# Patient Record
Sex: FEMALE | Race: WHITE | NOT HISPANIC OR LATINO | Employment: OTHER | URBAN - METROPOLITAN AREA
[De-identification: names, ages, dates, MRNs, and addresses within clinical notes are randomized per-mention and may not be internally consistent; named-entity substitution may affect disease eponyms.]

---

## 2017-01-30 ENCOUNTER — ALLSCRIPTS OFFICE VISIT (OUTPATIENT)
Dept: OTHER | Facility: OTHER | Age: 82
End: 2017-01-30

## 2017-05-12 ENCOUNTER — APPOINTMENT (EMERGENCY)
Dept: CT IMAGING | Facility: HOSPITAL | Age: 82
End: 2017-05-12
Payer: MEDICARE

## 2017-05-12 ENCOUNTER — HOSPITAL ENCOUNTER (EMERGENCY)
Facility: HOSPITAL | Age: 82
Discharge: HOME | End: 2017-05-12
Attending: EMERGENCY MEDICINE | Admitting: EMERGENCY MEDICINE
Payer: MEDICARE

## 2017-05-12 VITALS
SYSTOLIC BLOOD PRESSURE: 158 MMHG | BODY MASS INDEX: 23.09 KG/M2 | DIASTOLIC BLOOD PRESSURE: 70 MMHG | HEIGHT: 58 IN | WEIGHT: 110 LBS | OXYGEN SATURATION: 100 % | TEMPERATURE: 96.7 F | RESPIRATION RATE: 18 BRPM | HEART RATE: 54 BPM

## 2017-05-12 DIAGNOSIS — N39.0 UTI (URINARY TRACT INFECTION): ICD-10-CM

## 2017-05-12 DIAGNOSIS — S09.90XA HEAD INJURY, INITIAL ENCOUNTER: ICD-10-CM

## 2017-05-12 DIAGNOSIS — K59.00 CONSTIPATION, UNSPECIFIED CONSTIPATION TYPE: ICD-10-CM

## 2017-05-12 DIAGNOSIS — R01.1 HEART MURMUR: ICD-10-CM

## 2017-05-12 DIAGNOSIS — R53.1 GENERALIZED WEAKNESS: Primary | ICD-10-CM
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
## 2017-05-12 LAB
ALBUMIN SERPL BCP-MCNC: 3.6 G/DL (ref 3.5–5)
ALP SERPL-CCNC: 72 U/L (ref 46–116)
ALT SERPL W P-5'-P-CCNC: 22 U/L (ref 12–78)
ANION GAP SERPL CALCULATED.3IONS-SCNC: 6 MMOL/L (ref 4–13)
AST SERPL W P-5'-P-CCNC: 24 U/L (ref 5–45)
ATRIAL RATE: 66 BPM
BACTERIA UR QL AUTO: ABNORMAL /HPF
BASOPHILS # BLD AUTO: 0.02 THOUSANDS/ΜL (ref 0–0.1)
BASOPHILS NFR BLD AUTO: 0 % (ref 0–1)
BILIRUB SERPL-MCNC: 0.4 MG/DL (ref 0.2–1)
BILIRUB UR QL STRIP: NEGATIVE
BUN SERPL-MCNC: 20 MG/DL (ref 5–25)
CALCIUM SERPL-MCNC: 8.8 MG/DL (ref 8.3–10.1)
CHLORIDE SERPL-SCNC: 101 MMOL/L (ref 100–108)
CLARITY UR: ABNORMAL
CO2 SERPL-SCNC: 30 MMOL/L (ref 21–32)
COLOR UR: YELLOW
CREAT SERPL-MCNC: 0.98 MG/DL (ref 0.6–1.3)
EOSINOPHIL # BLD AUTO: 0.06 THOUSAND/ΜL (ref 0–0.61)
EOSINOPHIL NFR BLD AUTO: 1 % (ref 0–6)
ERYTHROCYTE [DISTWIDTH] IN BLOOD BY AUTOMATED COUNT: 12.3 % (ref 11.6–15.1)
GFR SERPL CREATININE-BSD FRML MDRD: 52.8 ML/MIN/1.73SQ M
GLUCOSE SERPL-MCNC: 109 MG/DL (ref 65–140)
GLUCOSE UR STRIP-MCNC: NEGATIVE MG/DL
HCT VFR BLD AUTO: 40.4 % (ref 34.8–46.1)
HGB BLD-MCNC: 13.7 G/DL (ref 11.5–15.4)
HGB UR QL STRIP.AUTO: NEGATIVE
KETONES UR STRIP-MCNC: NEGATIVE MG/DL
LEUKOCYTE ESTERASE UR QL STRIP: ABNORMAL
LYMPHOCYTES # BLD AUTO: 1.64 THOUSANDS/ΜL (ref 0.6–4.47)
LYMPHOCYTES NFR BLD AUTO: 17 % (ref 14–44)
MCH RBC QN AUTO: 32.4 PG (ref 26.8–34.3)
MCHC RBC AUTO-ENTMCNC: 33.9 G/DL (ref 31.4–37.4)
MCV RBC AUTO: 96 FL (ref 82–98)
MONOCYTES # BLD AUTO: 1.38 THOUSAND/ΜL (ref 0.17–1.22)
MONOCYTES NFR BLD AUTO: 15 % (ref 4–12)
NEUTROPHILS # BLD AUTO: 6.39 THOUSANDS/ΜL (ref 1.85–7.62)
NEUTS SEG NFR BLD AUTO: 67 % (ref 43–75)
NITRITE UR QL STRIP: NEGATIVE
NON-SQ EPI CELLS URNS QL MICRO: ABNORMAL /HPF
P AXIS: 71 DEGREES
PH UR STRIP.AUTO: 7 [PH] (ref 4.5–8)
PLATELET # BLD AUTO: 179 THOUSANDS/UL (ref 149–390)
PMV BLD AUTO: 11.2 FL (ref 8.9–12.7)
POTASSIUM SERPL-SCNC: 3.9 MMOL/L (ref 3.5–5.3)
PR INTERVAL: 206 MS
PROT SERPL-MCNC: 7.6 G/DL (ref 6.4–8.2)
PROT UR STRIP-MCNC: NEGATIVE MG/DL
QRS AXIS: -64 DEGREES
QRSD INTERVAL: 84 MS
QT INTERVAL: 412 MS
QTC INTERVAL: 431 MS
RBC # BLD AUTO: 4.23 MILLION/UL (ref 3.81–5.12)
RBC #/AREA URNS AUTO: ABNORMAL /HPF
SODIUM SERPL-SCNC: 137 MMOL/L (ref 136–145)
SP GR UR STRIP.AUTO: 1.01 (ref 1–1.03)
T WAVE AXIS: 35 DEGREES
TROPONIN I SERPL-MCNC: <0.02 NG/ML
UROBILINOGEN UR QL STRIP.AUTO: 0.2 E.U./DL
VENTRICULAR RATE: 66 BPM
WBC # BLD AUTO: 9.49 THOUSAND/UL (ref 4.31–10.16)
WBC #/AREA URNS AUTO: ABNORMAL /HPF

## 2017-05-12 PROCEDURE — 36415 COLL VENOUS BLD VENIPUNCTURE: CPT | Performed by: EMERGENCY MEDICINE

## 2017-05-12 PROCEDURE — 80053 COMPREHEN METABOLIC PANEL: CPT | Performed by: EMERGENCY MEDICINE

## 2017-05-12 PROCEDURE — 81001 URINALYSIS AUTO W/SCOPE: CPT | Performed by: EMERGENCY MEDICINE

## 2017-05-12 PROCEDURE — G8980 MOBILITY D/C STATUS: HCPCS

## 2017-05-12 PROCEDURE — 84484 ASSAY OF TROPONIN QUANT: CPT | Performed by: EMERGENCY MEDICINE

## 2017-05-12 PROCEDURE — 93005 ELECTROCARDIOGRAM TRACING: CPT | Performed by: EMERGENCY MEDICINE

## 2017-05-12 PROCEDURE — 97163 PT EVAL HIGH COMPLEX 45 MIN: CPT

## 2017-05-12 PROCEDURE — G8978 MOBILITY CURRENT STATUS: HCPCS

## 2017-05-12 PROCEDURE — 85025 COMPLETE CBC W/AUTO DIFF WBC: CPT | Performed by: EMERGENCY MEDICINE

## 2017-05-12 PROCEDURE — 72131 CT LUMBAR SPINE W/O DYE: CPT

## 2017-05-12 PROCEDURE — 99285 EMERGENCY DEPT VISIT HI MDM: CPT

## 2017-05-12 PROCEDURE — 70450 CT HEAD/BRAIN W/O DYE: CPT

## 2017-05-12 PROCEDURE — 96360 HYDRATION IV INFUSION INIT: CPT

## 2017-05-12 PROCEDURE — E0255 HOSPITAL BED VAR HT W/ MATTR: HCPCS | Performed by: EMERGENCY MEDICINE

## 2017-05-12 PROCEDURE — G8979 MOBILITY GOAL STATUS: HCPCS

## 2017-05-12 RX ORDER — NITROFURANTOIN MACROCRYSTALS 100 MG/1
100 CAPSULE ORAL 2 TIMES DAILY
COMMUNITY
End: 2017-05-12

## 2017-05-12 RX ORDER — LEVOFLOXACIN 250 MG/1
250 TABLET ORAL DAILY
Qty: 3 TABLET | Refills: 0 | Status: SHIPPED | OUTPATIENT
Start: 2017-05-13 | End: 2017-05-16

## 2017-05-12 RX ORDER — DOXYCYCLINE HYCLATE 50 MG/1
324 CAPSULE, GELATIN COATED ORAL EVERY OTHER DAY
Qty: 15 TABLET | Refills: 0 | Status: SHIPPED | OUTPATIENT
Start: 2017-05-12 | End: 2018-03-19

## 2017-05-12 RX ORDER — ALPRAZOLAM 0.25 MG/1
0.25 TABLET ORAL
COMMUNITY
End: 2018-01-24 | Stop reason: ALTCHOICE

## 2017-05-12 RX ORDER — IBUPROFEN 600 MG/1
600 TABLET ORAL ONCE
Status: COMPLETED | OUTPATIENT
Start: 2017-05-12 | End: 2017-05-12

## 2017-05-12 RX ADMIN — IBUPROFEN 600 MG: 600 TABLET, FILM COATED ORAL at 15:06

## 2017-05-12 RX ADMIN — SODIUM CHLORIDE 500 ML: 0.9 INJECTION, SOLUTION INTRAVENOUS at 16:35

## 2017-05-18 ENCOUNTER — ALLSCRIPTS OFFICE VISIT (OUTPATIENT)
Dept: OTHER | Facility: OTHER | Age: 82
End: 2017-05-18

## 2017-06-20 ENCOUNTER — APPOINTMENT (EMERGENCY)
Dept: CT IMAGING | Facility: HOSPITAL | Age: 82
End: 2017-06-20
Payer: MEDICARE

## 2017-06-20 ENCOUNTER — HOSPITAL ENCOUNTER (EMERGENCY)
Facility: HOSPITAL | Age: 82
Discharge: HOME | End: 2017-06-20
Admitting: EMERGENCY MEDICINE
Payer: MEDICARE

## 2017-06-20 ENCOUNTER — APPOINTMENT (EMERGENCY)
Dept: RADIOLOGY | Facility: HOSPITAL | Age: 82
End: 2017-06-20
Payer: MEDICARE

## 2017-06-20 VITALS
WEIGHT: 105 LBS | HEART RATE: 55 BPM | DIASTOLIC BLOOD PRESSURE: 58 MMHG | OXYGEN SATURATION: 97 % | TEMPERATURE: 99.4 F | BODY MASS INDEX: 21.95 KG/M2 | SYSTOLIC BLOOD PRESSURE: 126 MMHG | RESPIRATION RATE: 19 BRPM

## 2017-06-20 DIAGNOSIS — N39.0 URINARY TRACT INFECTION: ICD-10-CM

## 2017-06-20 DIAGNOSIS — S00.93XA CONTUSION OF HEAD: ICD-10-CM

## 2017-06-20 DIAGNOSIS — T17.1XXA FOREIGN BODY IN NOSE: ICD-10-CM

## 2017-06-20 DIAGNOSIS — S00.31XA ABRASION OF NOSE: ICD-10-CM
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
## 2017-06-20 LAB
ALBUMIN SERPL BCP-MCNC: 3.2 G/DL (ref 3.5–5)
ALP SERPL-CCNC: 72 U/L (ref 46–116)
ALT SERPL W P-5'-P-CCNC: 20 U/L (ref 12–78)
ANION GAP SERPL CALCULATED.3IONS-SCNC: 6 MMOL/L (ref 4–13)
APTT PPP: 33 SECONDS (ref 23–35)
AST SERPL W P-5'-P-CCNC: 24 U/L (ref 5–45)
BACTERIA UR QL AUTO: ABNORMAL /HPF
BASOPHILS # BLD AUTO: 0.04 THOUSANDS/ΜL (ref 0–0.1)
BASOPHILS NFR BLD AUTO: 1 % (ref 0–1)
BILIRUB SERPL-MCNC: 0.2 MG/DL (ref 0.2–1)
BILIRUB UR QL STRIP: NEGATIVE
BUN SERPL-MCNC: 27 MG/DL (ref 5–25)
CALCIUM SERPL-MCNC: 8.8 MG/DL (ref 8.3–10.1)
CHLORIDE SERPL-SCNC: 104 MMOL/L (ref 100–108)
CLARITY UR: CLEAR
CO2 SERPL-SCNC: 31 MMOL/L (ref 21–32)
COLOR UR: YELLOW
CREAT SERPL-MCNC: 0.98 MG/DL (ref 0.6–1.3)
EOSINOPHIL # BLD AUTO: 0.15 THOUSAND/ΜL (ref 0–0.61)
EOSINOPHIL NFR BLD AUTO: 2 % (ref 0–6)
ERYTHROCYTE [DISTWIDTH] IN BLOOD BY AUTOMATED COUNT: 12.5 % (ref 11.6–15.1)
GFR SERPL CREATININE-BSD FRML MDRD: 52.8 ML/MIN/1.73SQ M
GLUCOSE SERPL-MCNC: 84 MG/DL (ref 65–140)
GLUCOSE UR STRIP-MCNC: NEGATIVE MG/DL
HCT VFR BLD AUTO: 36.4 % (ref 34.8–46.1)
HGB BLD-MCNC: 12.4 G/DL (ref 11.5–15.4)
HGB UR QL STRIP.AUTO: NEGATIVE
INR PPP: 1.08 (ref 0.86–1.16)
KETONES UR STRIP-MCNC: NEGATIVE MG/DL
LEUKOCYTE ESTERASE UR QL STRIP: ABNORMAL
LYMPHOCYTES # BLD AUTO: 1.91 THOUSANDS/ΜL (ref 0.6–4.47)
LYMPHOCYTES NFR BLD AUTO: 30 % (ref 14–44)
MCH RBC QN AUTO: 32.5 PG (ref 26.8–34.3)
MCHC RBC AUTO-ENTMCNC: 34.1 G/DL (ref 31.4–37.4)
MCV RBC AUTO: 96 FL (ref 82–98)
MONOCYTES # BLD AUTO: 0.91 THOUSAND/ΜL (ref 0.17–1.22)
MONOCYTES NFR BLD AUTO: 14 % (ref 4–12)
NEUTROPHILS # BLD AUTO: 3.42 THOUSANDS/ΜL (ref 1.85–7.62)
NEUTS SEG NFR BLD AUTO: 53 % (ref 43–75)
NITRITE UR QL STRIP: NEGATIVE
NON-SQ EPI CELLS URNS QL MICRO: ABNORMAL /HPF
OTHER STN SPEC: ABNORMAL
PH UR STRIP.AUTO: 7.5 [PH] (ref 4.5–8)
PLATELET # BLD AUTO: 158 THOUSANDS/UL (ref 149–390)
PMV BLD AUTO: 10.5 FL (ref 8.9–12.7)
POTASSIUM SERPL-SCNC: 4.4 MMOL/L (ref 3.5–5.3)
PROT SERPL-MCNC: 6.7 G/DL (ref 6.4–8.2)
PROT UR STRIP-MCNC: NEGATIVE MG/DL
PROTHROMBIN TIME: 13.8 SECONDS (ref 12.1–14.4)
RBC # BLD AUTO: 3.81 MILLION/UL (ref 3.81–5.12)
RBC #/AREA URNS AUTO: ABNORMAL /HPF
SODIUM SERPL-SCNC: 141 MMOL/L (ref 136–145)
SP GR UR STRIP.AUTO: 1.01 (ref 1–1.03)
TROPONIN I SERPL-MCNC: <0.02 NG/ML
UROBILINOGEN UR QL STRIP.AUTO: 0.2 E.U./DL
WBC # BLD AUTO: 6.43 THOUSAND/UL (ref 4.31–10.16)
WBC #/AREA URNS AUTO: ABNORMAL /HPF

## 2017-06-20 PROCEDURE — 85730 THROMBOPLASTIN TIME PARTIAL: CPT | Performed by: PHYSICIAN ASSISTANT

## 2017-06-20 PROCEDURE — 80053 COMPREHEN METABOLIC PANEL: CPT | Performed by: PHYSICIAN ASSISTANT

## 2017-06-20 PROCEDURE — 93005 ELECTROCARDIOGRAM TRACING: CPT | Performed by: PHYSICIAN ASSISTANT

## 2017-06-20 PROCEDURE — 90715 TDAP VACCINE 7 YRS/> IM: CPT | Performed by: PHYSICIAN ASSISTANT

## 2017-06-20 PROCEDURE — 36415 COLL VENOUS BLD VENIPUNCTURE: CPT | Performed by: PHYSICIAN ASSISTANT

## 2017-06-20 PROCEDURE — 72125 CT NECK SPINE W/O DYE: CPT

## 2017-06-20 PROCEDURE — 71020 HB CHEST X-RAY 2VW FRONTAL&LATL: CPT

## 2017-06-20 PROCEDURE — 81001 URINALYSIS AUTO W/SCOPE: CPT | Performed by: PHYSICIAN ASSISTANT

## 2017-06-20 PROCEDURE — 84484 ASSAY OF TROPONIN QUANT: CPT | Performed by: PHYSICIAN ASSISTANT

## 2017-06-20 PROCEDURE — 70450 CT HEAD/BRAIN W/O DYE: CPT

## 2017-06-20 PROCEDURE — 90471 IMMUNIZATION ADMIN: CPT

## 2017-06-20 PROCEDURE — 99284 EMERGENCY DEPT VISIT MOD MDM: CPT

## 2017-06-20 PROCEDURE — 85025 COMPLETE CBC W/AUTO DIFF WBC: CPT | Performed by: PHYSICIAN ASSISTANT

## 2017-06-20 PROCEDURE — 87086 URINE CULTURE/COLONY COUNT: CPT | Performed by: PHYSICIAN ASSISTANT

## 2017-06-20 PROCEDURE — 85610 PROTHROMBIN TIME: CPT | Performed by: PHYSICIAN ASSISTANT

## 2017-06-20 RX ORDER — SULFAMETHOXAZOLE AND TRIMETHOPRIM 800; 160 MG/1; MG/1
1 TABLET ORAL ONCE
Status: COMPLETED | OUTPATIENT
Start: 2017-06-20 | End: 2017-06-20

## 2017-06-20 RX ORDER — SULFAMETHOXAZOLE AND TRIMETHOPRIM 800; 160 MG/1; MG/1
1 TABLET ORAL 2 TIMES DAILY
Qty: 6 TABLET | Refills: 0 | Status: SHIPPED | OUTPATIENT
Start: 2017-06-20 | End: 2018-01-24 | Stop reason: ALTCHOICE

## 2017-06-20 RX ORDER — ASPIRIN 81 MG/1
81 TABLET ORAL EVERY OTHER DAY
COMMUNITY

## 2017-06-20 RX ORDER — RIBOFLAVIN (VITAMIN B2) 100 MG
100 TABLET ORAL DAILY
COMMUNITY
End: 2018-03-19

## 2017-06-20 RX ADMIN — SULFAMETHOXAZOLE AND TRIMETHOPRIM 1 TABLET: 800; 160 TABLET ORAL at 21:30

## 2017-06-20 RX ADMIN — TETANUS TOXOID, REDUCED DIPHTHERIA TOXOID AND ACELLULAR PERTUSSIS VACCINE, ADSORBED 0.5 ML: 5; 2.5; 8; 8; 2.5 SUSPENSION INTRAMUSCULAR at 19:52

## 2017-06-21 LAB
ATRIAL RATE: 67 BPM
P AXIS: 75 DEGREES
PR INTERVAL: 220 MS
QRS AXIS: -62 DEGREES
QRSD INTERVAL: 84 MS
QT INTERVAL: 420 MS
QTC INTERVAL: 443 MS
T WAVE AXIS: 71 DEGREES
VENTRICULAR RATE: 67 BPM

## 2017-06-23 LAB — BACTERIA UR CULT: NORMAL

## 2017-07-03 ENCOUNTER — ALLSCRIPTS OFFICE VISIT (OUTPATIENT)
Dept: OTHER | Facility: OTHER | Age: 82
End: 2017-07-03

## 2017-07-03 DIAGNOSIS — R29.898 OTHER SYMPTOMS AND SIGNS INVOLVING THE MUSCULOSKELETAL SYSTEM: ICD-10-CM
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
## 2017-08-22 ENCOUNTER — ALLSCRIPTS OFFICE VISIT (OUTPATIENT)
Dept: OTHER | Facility: OTHER | Age: 82
End: 2017-08-22

## 2017-08-25 ENCOUNTER — GENERIC CONVERSION - ENCOUNTER (OUTPATIENT)
Dept: OTHER | Facility: OTHER | Age: 82
End: 2017-08-25

## 2017-10-19 ENCOUNTER — ALLSCRIPTS OFFICE VISIT (OUTPATIENT)
Dept: OTHER | Facility: OTHER | Age: 82
End: 2017-10-19

## 2017-10-19 LAB
BILIRUB UR QL STRIP: NORMAL
CLARITY UR: NORMAL
COLOR UR: YELLOW
GLUCOSE (HISTORICAL): NORMAL
HGB UR QL STRIP.AUTO: NORMAL
KETONES UR STRIP-MCNC: NORMAL MG/DL
LEUKOCYTE ESTERASE UR QL STRIP: NORMAL
NITRITE UR QL STRIP: NORMAL
PH UR STRIP.AUTO: 6 [PH]
PROT UR STRIP-MCNC: NORMAL MG/DL
SP GR UR STRIP.AUTO: 1.01
UROBILINOGEN UR QL STRIP.AUTO: 0.2

## 2017-10-20 DIAGNOSIS — R26.9 ABNORMALITY OF GAIT AND MOBILITY: ICD-10-CM

## 2017-10-20 NOTE — PROGRESS NOTES
Assessment    1  Eczema (692 9) (L30 9)  2  Aortic stenosis, severe (424 1) (I35 0)  3  Dementia (294 20) (F03 90)  4  Depression (311) (F32 9)    Plan  Chronic low back pain, Urinary tract infection, site unspecified    · Urine Dip Non-Automated- POC; Status:Complete;   Done: 59MTK5628 02:28PM  Eczema    · Start: Triamcinolone Acetonide 0 1 % External Cream; USE AS DIRECTED  Need for influenza vaccination    · Administered: Fluzone High-Dose 0 5 ML Intramuscular Suspension Prefilled Syringe    Discussion/Summary    I have asked him to apply triamcinolone cream along with a plastic bag and a sock at bedtime and during the day to apply an elastic bandage along with some moisturizing ointment  a walker was requested for this patient  She is unable to leave her home without considerable effort and this is very taxing by her limited door ends and her easy fatigability in a short distance of ambulation  1        1 Amended By: Myranda Silveira; Dec 07 2017 7:14 PM EST    Chief Complaint  Pt here for flu shot, u/a and crack on right heal       Advance Directives  Advance Directive [facility]:   The patient is not in agreement to receive the Advance Care Planning service--   YES - Patient has an advance health care directive  The patient has a signed POLST form located in patient's home  History of Present Illness  HPI: Patient is here with her daughter  She has a crack on her right heel  They Jonny put applying Neosporin to it  She brought a urine for dip because of concerns of urinary frequency  Her appetite remains suboptimal  She denies any pain      Review of Systems   Constitutional: No fever, no chills, feels well, no tiredness, no recent weight gain or loss  ENT: no ear ache, no loss of hearing, no nosebleeds or nasal discharge, no sore throat or hoarseness  Cardiovascular: no complaints of slow or fast heart rate, no chest pain, no palpitations, no leg claudication or lower extremity edema    Respiratory: no complaints of shortness of breath, no wheezing, no dyspnea on exertion, no orthopnea or PND  Breasts: no complaints of breast pain, breast lump or nipple discharge  Gastrointestinal: no complaints of abdominal pain, no constipation, no nausea or diarrhea, no vomiting, no bloody stools  Genitourinary: no complaints of dysuria, no incontinence, no pelvic pain, no dysmenorrhea, no vaginal discharge or abnormal vaginal bleeding  Musculoskeletal: no complaints of arthralgia, no myalgia, no joint swelling or stiffness, no limb pain or swelling  Integumentary: no complaints of skin rash or lesion, no itching or dry skin, no skin wounds  Neurological: no complaints of headache, no confusion, no numbness or tingling, no dizziness or fainting  Active Problems  1  Acute gingivitis (523 00) (K05 00)  2  Aortic stenosis, severe (424 1) (I35 0)  3  Basal cell carcinoma of skin of nose (173 31) (C44 311)  4  Basal Cell Carcinoma Of The Skin Of The Neck (173 41)  5  Chronic low back pain (724 2,338 29) (M54 5,G89 29)  6  Constipation (564 00) (K59 00)  7  Decubitus ulcer of right ankle, stage 2 (480 83,639 31) (L89 512)  8  Dehydration (276 51) (E86 0)  9  Dementia (294 20) (F03 90)  10  Depression (311) (F32 9)  11  Dyslipidemia (272 4) (E78 5)  12  Falls (E888 9) (W19 XXXA)  13  Flu vaccine need (V04 81) (Z23)  14  Gastroesophageal reflux disease (530 81) (K21 9)  15  Iron deficiency anemia secondary to inadequate dietary iron intake (280 1) (D50 8)  16  Malignant neoplasm of breast (174 9) (C50 919)  17  Medicare annual wellness visit, subsequent (V70 0) (Z00 00)  18  Muscular deconditioning (781 99) (R29 898)  19  Overactive bladder (596 51) (N32 81)  20  Urinary tract infection, site unspecified (599 0) (N39 0)    Past Medical History  1  History of Age At First Period 15 Years Old (Menarche)  2  History of Age At First Pregnancy 21 Years Old  3  History of Benign essential hypertension (401 1) (I10)  4   History of Benign hypertensive CKD (403 10) (I12 9)  5  History of Breast Cancer (V10 3)  6  History of Closed Compression Fracture Of Lumbar Vertebral Body (805 4)  7  History of Clostridium difficile colitis (008 45) (A04 72)  8  History of Costochondritis (733 6) (M94 0)  9  History of Currently Wearing Eyeglasses  10  History of Gait disturbance (781 2) (R26 9)  11  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)  12  History Of 2  Previous Pregnancies (V61 5)  13  History of aortic valve stenosis (V12 59) (Z86 79)  14  History of bursitis (V13 59) (Z87 39)  15  History of diverticulitis of colon (V12 79) (Z87 19)  16  History of gastroesophageal reflux (GERD) (V12 79) (Z87 19)  17  History of herpes zoster (V12 09) (Z86 19)  18  History of hiatal hernia (V12 79) (Z87 19)  19  History of nausea (V12 79) (Z87 898)  20  History of neoplasm of uncertain behavior of skin (V13 3) (Z86 03)  21  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)  22  History of Neck pain (723 1) (M54 2)  23  History of Overactive bladder (596 51) (N32 81)  24  History of Previous Pregnancies Resulted In 2  Live Birth(S)  25  History of Right Trapezius Muscle Strain (840 8)  26  History of Skin rash (782 1) (R21)  27  History of Urge incontinence of urine (788 31) (N39 41)  28  History of Urinary Tract Infection (V13 02)  Active Problems And Past Medical History Reviewed: The active problems and past medical history were reviewed and updated today  Family History  Mother   1  Family history of Heart Disease (V17 49)  Father   2  Family history of Cancer  Sister   3  Family history of Cancer  Brother   4  Family history of Cancer  Maternal Aunt   5  Family history of Breast Cancer (V16 3)  Family History   6  Family history of Rectal Cancer  Family History Reviewed: The family history was reviewed and updated today         Social History   · Daily Coffee Consumption (2  Cups/Day)   · Denied: History of Drug Use   · Never A Smoker   · Never Drank Alcohol   · Nonsmoker (V49 89) (Z78 9)  The social history was reviewed and updated today  Surgical History    1  History of Breast Surgery Lumpectomy  2  History of Cholecystectomy  3  History of Hysterectomy  Surgical History Reviewed: The surgical history was reviewed and updated today  Current Meds  1  Acidophilus Probiotic Oral Tablet; Therapy: 39Pfb1628 to Recorded  2  Aspirin Childrens 81 MG Oral Tablet Chewable; TAKE ONE CHEWABLE TABLET EVERY OTHER DAY; Therapy: 07BOT0858 to Recorded  3  Calcium 500 MG TABS; Therapy: 81FYW0218 to Recorded  4  Cephalexin 500 MG Oral Capsule; TAKE 1 CAPSULE EVERY 8 HOURS UNTIL GONE; Therapy: 03Knz4977 to (Evaluate:33Fty4868)  Requested for: 02Fet7516; Last Rx:28Hzr9085 Ordered  5  Citalopram Hydrobromide 10 MG Oral Tablet; take 1 tablet by mouth every day; Therapy: 80Pzj1678 to (Evaluate:19Mar2018)  Requested for: 65Ypu1181; Last Rx:87Gux0231 Ordered  6  Cranberry 125 MG Oral Tablet; Therapy: 55KFX3019 to Recorded  7  Famotidine 10 MG Oral Tablet; Therapy: 06JPW9707 to Recorded  8  Ferrous Gluconate 325 (36 Fe) MG Oral Tablet; Take 1 tablet daily; Therapy: 92HEY9382 to (Evaluate:80Wxw9988)  Requested for: 72GVD4021; Last VQ:52KMN7677 Ordered  9  Letrozole 2 5 MG Oral Tablet; take 1 tablet by mouth every day; Therapy: 30APQ2567 to (Evaluate:69Zvq8717)  Requested for: 65EVJ4240; Last Rx:29Izm2643 Ordered  10  Mupirocin 2 % External Ointment; APPLY AS DIRECTED; Therapy: 42YKK0509 to (Last Rx:56Bgz4694)  Requested for: 64BAS8222 Ordered  11  Namenda XR 14 MG Oral Capsule Extended Release 24 Hour; take 1 capsule daily; Therapy: 33FMP2047 to (Evaluate:19Mar2018)  Requested for: 82AHY8486; Last  Rx:56Eyv2324 Ordered  12  Nitrofurantoin Macrocrystal 100 MG Oral Capsule; TAKE 1 CAPSULE EVERY 12 HOURS  DAILY; Therapy: 88YAG3413 to (Evaluate:58Aod2142)  Requested for: 24XRL6720; Last  Rx:38Fem3953 Ordered    The medication list was reviewed and updated today  Allergies  1  Codeine Sulfate TABS  2  Percocet TABS  3  Vicodin TABS  4  TraMADol HCl TABS  5  Adhesive Tape    Vitals   Recorded: 57VTB6575 02:13PM   Temperature 97 8 F   Heart Rate 51   Systolic 500   Diastolic 72   Height 5 ft    Height Unobtainable No   Weight Unobtainable Yes   O2 Saturation 97       Physical Exam   Constitutional  General appearance: No acute distress, well appearing and well nourished  Eyes  Conjunctiva and lids: No swelling, erythema or discharge  Pupils and irises: Equal, round and reactive to light  Ears, Nose, Mouth, and Throat  External inspection of ears and nose: Normal    Otoscopic examination: Tympanic membranes translucent with normal light reflex  Canals patent without erythema  Nasal mucosa, septum, and turbinates: Normal without edema or erythema  Oropharynx: Normal with no erythema, edema, exudate or lesions  Pulmonary  Respiratory effort: No increased work of breathing or signs of respiratory distress  Auscultation of lungs: Clear to auscultation  Cardiovascular  Auscultation of heart: Abnormal  -- Murmur noted  Examination of extremities for edema and/or varicosities: Normal    Carotid pulses: Normal    Abdomen  Abdomen: Non-tender, no masses  Liver and spleen: No hepatomegaly or splenomegaly  Lymphatic  Palpation of lymph nodes in neck: No lymphadenopathy  Musculoskeletal  Gait and station: Normal    Digits and nails: Normal without clubbing or cyanosis  Inspection/palpation of joints, bones, and muscles: Normal    Skin  Skin and subcutaneous tissue: Abnormal  -- Large linear crackle on the right heel  Neurologic  Cranial nerves: Cranial nerves 2-12 intact  Reflexes: 2+ and symmetric  Sensation: No sensory loss     Psychiatric  Orientation to person, place, and time: Normal    Mood and affect: Normal          Results/Data  Urine Dip Non-Automated- POC 66FEU5569 02:28PM Anselmo Mask     Test Name Result Flag Reference   Color Yellow Clarity Transparent     Leukocytes NEG     Nitrite NEG     Blood NEG     Bilirubin NEG     Urobilinogen 0 2     Protein NEG     Ph 6 0     Specific Gravity 1 010     Ketone NEG     Glucose NEG         Future Appointments    Date/Time Provider Specialty Site   10/24/2017 01:00 PM NYA Bello   [address]       Signatures   Electronically signed by : Anusha Delgado DO; Dec  7 2017  7:15PM EST                       (Author)

## 2017-10-24 ENCOUNTER — GENERIC CONVERSION - ENCOUNTER (OUTPATIENT)
Dept: OTHER | Facility: OTHER | Age: 82
End: 2017-10-24

## 2017-10-24 ENCOUNTER — ALLSCRIPTS OFFICE VISIT (OUTPATIENT)
Dept: OTHER | Facility: OTHER | Age: 82
End: 2017-10-24

## 2017-11-29 ENCOUNTER — ALLSCRIPTS OFFICE VISIT (OUTPATIENT)
Dept: OTHER | Facility: OTHER | Age: 82
End: 2017-11-29

## 2018-01-12 VITALS
OXYGEN SATURATION: 99 % | TEMPERATURE: 97.4 F | SYSTOLIC BLOOD PRESSURE: 120 MMHG | WEIGHT: 105 LBS | DIASTOLIC BLOOD PRESSURE: 60 MMHG | HEIGHT: 60 IN | BODY MASS INDEX: 20.62 KG/M2 | RESPIRATION RATE: 16 BRPM | HEART RATE: 58 BPM

## 2018-01-13 VITALS
HEIGHT: 60 IN | DIASTOLIC BLOOD PRESSURE: 72 MMHG | SYSTOLIC BLOOD PRESSURE: 106 MMHG | HEART RATE: 64 BPM | OXYGEN SATURATION: 97 % | TEMPERATURE: 98.3 F

## 2018-01-13 VITALS
DIASTOLIC BLOOD PRESSURE: 72 MMHG | HEART RATE: 51 BPM | TEMPERATURE: 97.8 F | OXYGEN SATURATION: 97 % | HEIGHT: 60 IN | SYSTOLIC BLOOD PRESSURE: 110 MMHG

## 2018-01-13 VITALS
OXYGEN SATURATION: 98 % | HEART RATE: 63 BPM | WEIGHT: 106.25 LBS | DIASTOLIC BLOOD PRESSURE: 64 MMHG | TEMPERATURE: 96.9 F | HEIGHT: 60 IN | BODY MASS INDEX: 20.86 KG/M2 | SYSTOLIC BLOOD PRESSURE: 115 MMHG

## 2018-01-14 VITALS
WEIGHT: 107 LBS | HEIGHT: 60 IN | OXYGEN SATURATION: 98 % | BODY MASS INDEX: 21.01 KG/M2 | HEART RATE: 77 BPM | DIASTOLIC BLOOD PRESSURE: 68 MMHG | TEMPERATURE: 99.6 F | SYSTOLIC BLOOD PRESSURE: 108 MMHG

## 2018-01-18 NOTE — PROGRESS NOTES
Assessment    1  Aortic stenosis, severe (424 1) (I35 0)   2  Debility (799 3) (R53 81)   3  Goals of care, counseling/discussion (V65 49) (Z71 89)   4  Falls (E888 9) (W19 XXXA)   5  Dementia (294 20) (F03 90)    Discussion/Summary    Goals of care: POLST previously completed  5 wishes reviewed and competed today  At this time, pt does not meet hospice eligibly criteria but is at high risk for complications d/t her frailty and history of falls  Pt is clear that should an event occur she would elect conservative treatment options and choose hospice if appropriate  He daughter is aware of her wishes and supportive  History of falls:  -Pt's home environment assessed and recommendations made regarding throw rugs, suggested removing these as they could be tripping hazard    -Pt uses walker and is currently receiving home PT twice a week  Encouraged her to take her time changing positions and always use her walker for ambulation  Suggestions provided to pt's daughter regarding additional home services that may help alleviate care giver burden  Recommended the use of Tylenol to help treat mild aches and pains  Pt denies need for palliative symptom control a this time  I will return for a F/U visit in 2 months  Please call with questions or concerns  I can return sooner should pt develop any symptoms that would require palliative management  recommend tylenol mild ahes ad pain  eucerin cream    The patient, patient's family was counseled regarding instructions for management  total time of encounter was 90 minutes and 60 minutes was spent counseling  time spent filling out 5 wishes document, discussing goals of care  The patient has the current Goals: Remain at home  The patent has the current Barriers: Frail and not yet hospice appropriate  Patient is able to Self-Care  Possible side effects of new medications were reviewed with the patient/guardian today   The treatment plan was reviewed with the patient/guardian  The patient/guardian understands and agrees with the treatment plan     Self Referrals: No      Chief Complaint  PAL at [address]  initial visit to establish care in home program  Pt with severe aortic stenosis, frailty and h/o frequent falls      History of Present Illness  Ms Lucio Stubbs is a very pleasant 79 yo female who has been following with Dr Luis M Rodriguez for several chronic medical conditions including severe AS, dementia, frequent falls  She is being seen at home today due to her frailty and difficulty making it into doctor's appointments  Pt's daughter Alissa Rebolledo was present for a portion of today's visit and provided assistance with med reconciliation and report on pt's wellbeing since she was last seen  Pt herself has poor short term memory  She does appear competent today during my visit and answers my questions appropriately  She has requested assistance with completing her AD (5 wishes was started at her last office visit) as she expresses her concern that her memory will fail her and she wants her wishes to be known and followed by her children  Per daughter, her mom has frequent falls; mostly recently she had a fall last week but did not injure herself or require medical attention  Pt herself denies any chest pain, shortness or breath, change in appetite or bowel and bladder function  She has some generalized aches and states "I feel like an old lady"    Pt lives in an apartment built within family home; she has caregivers 3 days a week, meals on wheels, and home PT twice a week  Her daughter feels the stress of caring for her mom 24/7  Pt refuses SNF placement and her ultimate goal is to "die at home"     The patient has a POLST form  Section A: Cardiopulmonary Resuscitation (CPR): DNR/Do not attempt resuscitation (allow natural death)  Section B: Medical Interventions: Limited additional interventions     Section C: Antibiotics: Determine use or limitation of antibiotics when infection occurs, with comfort a goal    Section D: Artificially Administered Hydration/Nutrition: No hydration and artificial nutrition by tube  The patient has a legal Power of   The POA name is Shared between [address] [place] [address] and [place]  The patient has a five wishes document  Discussion of advanced directives 5 wishes documentation completed today  Review of Systems    Constitutional: No fever, no chills, feels well, no tiredness, no recent weight gain or weight loss  Eyes: No complaints of eye pain, no red eyes, no eyesight problems, no discharge, no dry eyes, no itching of eyes  ENT: no complaints of earache, no loss of hearing, no nose bleeds, no nasal discharge, no sore throat, no hoarseness  Cardiovascular: No complaints of slow heart rate, no fast heart rate, no chest pain, no palpitations, no leg claudication, no lower extremity edema  Respiratory: No complaints of shortness of breath, no wheezing, no cough, no SOB on exertion, no orthopnea, no PND  Gastrointestinal: No complaints of abdominal pain, no constipation, no nausea or vomiting, no diarrhea, no bloody stools  Genitourinary: No complaints of dysuria, no incontinence, no pelvic pain, no dysmenorrhea, no vaginal discharge or bleeding  The patient presents with complaints of mild lower back arthralgias  Integumentary: No complaints of skin rash or lesions, no itching, no skin wounds, no breast pain or lump  Neurological: difficulty walking  Psychiatric: Not suicidal, no sleep disturbance, no anxiety or depression, no change in personality, no emotional problems  Endocrine: muscle weakness  Hematologic/Lymphatic: No complaints of swollen glands, no swollen glands in the neck, does not bleed easily, does not bruise easily  ROS reviewed  Active Problems    1  Acute gingivitis (523 00) (K05 00)   2  Aortic stenosis, severe (424 1) (I35 0)   3  Basal cell carcinoma of skin of nose (173 31) (C42 311)   4   Basal Cell Carcinoma Of The Skin Of The Neck (173 41)   5  Chronic low back pain (724 2,338 29) (M54 5,G89 29)   6  Constipation (564 00) (K59 00)   7  Debility (799 3) (R53 81)   8  Decubitus ulcer of right ankle, stage 2 (911 53,032 78) (L89 512)   9  Dehydration (276 51) (E86 0)   10  Dementia (294 20) (F03 90)   11  Depression (311) (F32 9)   12  Dyslipidemia (272 4) (E78 5)   13  Eczema (692 9) (L30 9)   14  Falls (E888 9) (W19 XXXA)   15  Flu vaccine need (V04 81) (Z23)   16  Gait disturbance (781 2) (R26 9)   17  Gastroesophageal reflux disease (530 81) (K21 9)   18  Iron deficiency anemia secondary to inadequate dietary iron intake (280 1) (D50 8)   19  Malignant neoplasm of breast (174 9) (C50 919)   20  Medicare annual wellness visit, subsequent (V70 0) (Z00 00)   21  Muscular deconditioning (781 99) (R29 898)   22  Need for influenza vaccination (V04 81) (Z23)   23  Overactive bladder (596 51) (N32 81)   24  UTI (urinary tract infection) (599 0) (N39 0)    Past Medical History    1  History of Age At First Period 15 Years Old (Menarche)   2  History of Age At First Pregnancy 21 Years Old   3  History of Benign essential hypertension (401 1) (I10)   4  History of Benign hypertensive CKD (403 10) (I12 9)   5  History of Breast Cancer (V10 3)   6  History of Closed Compression Fracture Of Lumbar Vertebral Body (805 4)   7  History of Clostridium difficile colitis (008 45) (A04 72)   8  History of Costochondritis (733 6) (M94 0)   9  History of Currently Wearing Eyeglasses   10  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)   11  History Of 2  Previous Pregnancies (V61 5)   12  History of aortic valve stenosis (V12 59) (Z86 79)   13  History of bursitis (V13 59) (Z87 39)   14  History of diverticulitis of colon (V12 79) (Z87 19)   15  History of gastroesophageal reflux (GERD) (V12 79) (Z87 19)   16  History of herpes zoster (V12 09) (Z86 19)   17  History of hiatal hernia (V12 79) (Z87 19)   18  History of nausea (V12 79) (Z87 898)   19  History of neoplasm of uncertain behavior of skin (V13 3) (Z86 03)   20  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)   21  History of Neck pain (723 1) (M54 2)   22  History of Overactive bladder (596 51) (N32 81)   23  History of Previous Pregnancies Resulted In 2  Live Birth(S)   24  History of Right Trapezius Muscle Strain (840 8)   25  History of Skin rash (782 1) (R21)   26  History of Urge incontinence of urine (788 31) (N39 41)   27  History of Urinary Tract Infection (V13 02)    Surgical History    1  History of Breast Surgery Lumpectomy   2  History of Cholecystectomy   3  History of Hysterectomy    The surgical history was reviewed and updated today  Family History    1  Family history of Heart Disease (V17 49)    2  Family history of Cancer    3  Family history of Cancer    4  Family history of Cancer    5  Family history of Breast Cancer (V16 3)    6  Family history of Rectal Cancer    The family history was reviewed and updated today  Social History    · Daily Coffee Consumption (2  Cups/Day)   · Denied: History of Drug Use   · Never A Smoker   · Never Drank Alcohol   · Nonsmoker (V49 89) (Z78 9)  The social history was reviewed and updated today  The social history was reviewed and is unchanged  Current Meds   1  Acidophilus Probiotic Oral Tablet; Therapy: 29Gdz5902 to Recorded   2  Aspirin Childrens 81 MG Oral Tablet Chewable; TAKE ONE CHEWABLE TABLET EVERY   OTHER DAY; Therapy: 13HNY5620 to Recorded   3  Calcium 500 MG TABS; Therapy: 52YGA6644 to Recorded   4  Citalopram Hydrobromide 10 MG Oral Tablet; take 1 tablet by mouth every day; Therapy: 68Ntk1072 to (Evaluate:19Mar2018)  Requested for: 87Rfg0920; Last   Rx:24Cak7683 Ordered   5  Colace Clear 50 MG Oral Capsule; Therapy: 50ZKW3699 to Recorded   6  Cranberry 125 MG Oral Tablet; Therapy: 65RDS2653 to Recorded   7  Famotidine 10 MG Oral Tablet;    Therapy: 29CZF4585 to Recorded 8  Ferrous Gluconate 325 (36 Fe) MG Oral Tablet; Take 1 tablet daily; Therapy: 62QJC5566 to (Evaluate:31Yip0463)  Requested for: 83WER3985; Last   Rx:26Jun2017 Ordered   9  Letrozole 2 5 MG Oral Tablet; take 1 tablet by mouth every day; Therapy: 69PHO4154 to (Evaluate:41Aay2624)  Requested for: 70JBM5520; Last   Rx:63Rce4222 Ordered   10  Namenda XR 14 MG Oral Capsule Extended Release 24 Hour; take 1 capsule daily; Therapy: 07IIP1050 to (Evaluate:19Mar2018)  Requested for: 33ZYA1146; Last    Rx:34Wcx6712 Ordered   11  Triamcinolone Acetonide 0 1 % External Cream; USE AS DIRECTED; Therapy: 92GRY2157 to (Last Rx:19Oct2017)  Requested for: 19Oct2017 Ordered   12  Vitamin C 250 MG Oral Tablet Chewable; CHEW AND SWALLOW 1 TABLET DAILY; Therapy: 02FTV4353 to Recorded    The medication list was reviewed and updated today  Allergies    1  Codeine Sulfate TABS   2  Percocet TABS   3  Vicodin TABS   4  TraMADol HCl TABS    5  Adhesive Tape    Vitals  Signs   Recorded: 37CYS0459 03:40PM   Temperature: 97 4 F  Heart Rate: 60  Respiration: 16  Systolic: 933  Diastolic: 70    Physical Exam    Constitutional   General appearance: Abnormal   Alert, pleasant, Appears Frail  Eyes   Conjunctiva and lids: No swelling, erythema or discharge  Ears, Nose, Mouth, and Throat   Oropharynx: Normal with no erythema, edema, exudate or lesions  Pulmonary   Respiratory effort: No increased work of breathing or signs of respiratory distress  Cardiovascular   Auscultation of heart: Abnormal   + Murmur  Abdomen   Abdomen: Non-tender, no masses  Musculoskeletal Slow steady gait; requires use of walker  Inspection/palpation of joints, bones, and muscles: Abnormal   Kyphosis  Skin Dry, thin skin  Neurologic   Cranial nerves: Cranial nerves 2-12 intact  Psychiatric   Orientation to person, place, and time: Normal   Short term memory loss     Mood and affect: Normal          Attending Note  Collaborating Physician: I did not interview and examine the patient, I discussed the case with the Advanced Practitioner and reviewed the note and I agree with the Advanced Practitioner note        Signatures   Electronically signed by : Nico Beal [facility]; Nov 29 2017  6:06PM EST                       (Author)    Electronically signed by : NYA Clark ; Nov 30 2017  2:06PM EST                       (Author)

## 2018-01-22 VITALS
SYSTOLIC BLOOD PRESSURE: 120 MMHG | TEMPERATURE: 97.4 F | RESPIRATION RATE: 16 BRPM | HEART RATE: 60 BPM | DIASTOLIC BLOOD PRESSURE: 70 MMHG

## 2018-01-22 VITALS
SYSTOLIC BLOOD PRESSURE: 106 MMHG | DIASTOLIC BLOOD PRESSURE: 70 MMHG | HEIGHT: 60 IN | TEMPERATURE: 97.4 F | RESPIRATION RATE: 14 BRPM | WEIGHT: 106 LBS | HEART RATE: 60 BPM | BODY MASS INDEX: 20.81 KG/M2

## 2018-01-24 ENCOUNTER — OFFICE VISIT (OUTPATIENT)
Dept: PALLIATIVE MEDICINE | Facility: HOSPITAL | Age: 83
End: 2018-01-24
Payer: MEDICARE

## 2018-01-24 VITALS
DIASTOLIC BLOOD PRESSURE: 60 MMHG | TEMPERATURE: 97.4 F | RESPIRATION RATE: 16 BRPM | HEART RATE: 56 BPM | SYSTOLIC BLOOD PRESSURE: 110 MMHG

## 2018-01-24 DIAGNOSIS — Z91.81 AT HIGH RISK FOR FALLS: ICD-10-CM

## 2018-01-24 DIAGNOSIS — L89.612 DECUBITUS ULCER OF RIGHT HEEL, STAGE 2 (HCC): Primary | ICD-10-CM

## 2018-01-24 DIAGNOSIS — R54 AGE-RELATED PHYSICAL DEBILITY: ICD-10-CM

## 2018-01-24 DIAGNOSIS — I35.0 SEVERE AORTIC STENOSIS: ICD-10-CM

## 2018-01-24 PROCEDURE — 99349 HOME/RES VST EST MOD MDM 40: CPT | Performed by: NURSE PRACTITIONER

## 2018-01-24 NOTE — PROGRESS NOTES
Assessment:  Aortic Stenosis, severe  Debility  H/O falls  Stage 2 pressure ulcer right heel    Plan    Referral to Wound care clinic to assess and provide treatment recommendations  Recommendations made to prevent further skin breakdown and pt encouraged to elevate heel off the surface with a pillow and encouraged proper nutrition and adequate protein intake  Justyna Castillo and her daughter deny any need for palliative symptom control at this time  I will return for a follow up visit in 2 months  Please call with any questions or concerns  I can return sooner should Justyna Castillo develop any symptoms that would require palliative management  Advanced care Planning: No changes to overall goals of care that were dicussed at previous visit  5 wishes and POLST form completed    At this time, Justyna aCstillo does not meet hospice eligibility criteria but is at high risk for complications due to her frailty, frequent UTIs, and history of falls  Patient is clear that should an event occur  she would elect conservative treatment options and choose hospice if appropriate  Her daughter is aware of her wishes and very supportive  60 minutes was spent face to face with Shireen Mock and her daughter with greater than 50% of the time spent in counseling or coordination of care including discussions of treatment instructions and patient and family counseling/involvement in care   All of the patient's questions were answered during this discussion  Chief Complaint    Home Follow up visit as part of [facility] program for: severe aortic stenosis, debility, h/o frequent falls and new stage 2 pressure ulcer of right heel     History of Present Illness    Ms Macario Cresnhaw is a very pleasant 57-year-old female with several chronic medical conditions including severe aortic stenosis, debility, recurring UTIs, frequent falls, and a new stage II pressure ulcer located on her right heel    She is being seen at home today due to her frailty and difficulty making it to doctor's appointments  Her daughter Florentino Swan was present for today's visit  Since last visit, she has been treated for a UTI and completed a course of antibiotics for this sore on her right heel  She denies fever or chills  She has some generalized aches and pains particularly in her lower back and relates this to her old age  Overall, Florentino Swan states that her mom has been doing rather well  She has not had any recent falls  Her appetite is good and she reports no changes in her bowel or bladder functions  She has completed home PT services and is ambulating with her walker  She spends most of the day sedentary due to becoming fatigued easily  She denies chest pain, palpitations  No shortness of breath    Florentino Swan reports that she has hired additional caregivers to be available so that there is always someone with Kayla Ch and providing support  Review of Systems    Ear, Nose, Mouth, and Throat: Negative  Respiratory: Negative  Cardiovascular: Negative   Gastrointestinal: Negative  Genitourinary: Negative  Musculoskeletal:back pain positive,  lower,  mild  Integumentary: Breakdown; stage 2 pressure ulcer located on right theel  Neurologic: Abnormal balance, difficulty walking   Psychiatric:Negative    All other systems:All negative except as listed above    Vital Signs    There were no vitals taken for this visit  Physical Exam    General: Alert, pleasant  Frail appearing  Neuro:Alert and oriented x 4, No motor deficits   Short term memory loss  ENT:oropharynx clear without erythema  Eyes:white sclera, Extraocular Movements Intact  Pulm:clear to auscultation bilaterally  CV: + Murmur  GI:non-distended, non-tender  Musculoskeletal: Kyphosis; slow to rise from seated position, requires use of walker for ambulation  Integument: Right heel has stage 2 breakdown approximately 2x2 cm  Psych:appropriate affect    ROS      Physical Exam

## 2018-01-24 NOTE — PATIENT INSTRUCTIONS
Fall Prevention   AMBULATORY CARE:   Fall prevention  includes ways to make your home and other areas safer  It also includes ways you can move more carefully to prevent a fall  Health conditions that cause changes in your blood pressure, vision, or muscle strength and coordination may increase your risk for falls  Medicines may also increase your risk for falls if they make you dizzy, weak, or sleepy  Call 911 or have someone else call if:   · You have fallen and are unconscious  · You have fallen and cannot move part of your body  Contact your healthcare provider if:   · You have fallen and have pain or a headache  · You have questions or concerns about your condition or care  Fall prevention tips:   · Stand or sit up slowly  This may help you keep your balance and prevent falls  · Use assistive devices as directed  Your healthcare provider may suggest that you use a cane or walker to help you keep your balance  You may need to have grab bars put in your bathroom near the toilet or in the shower  · Wear shoes that fit well and have soles that   Wear shoes both inside and outside  Use slippers with good   Do not wear shoes with high heels  · Wear a personal alarm  This is a device that allows you to call 911 if you fall and need help  Ask your healthcare provider for more information  · Stay active  Exercise can help strengthen your muscles and improve your balance  Your healthcare provider may recommend water aerobics or walking  He or she may also recommend physical therapy to improve your coordination  Never start an exercise program without talking to your healthcare provider first      · Manage your medical conditions  Keep all appointments with your healthcare providers  Visit your eye doctor as directed  Home safety tips:   · Add items to prevent falls in the bathroom  Put nonslip strips on your bath or shower floor to prevent you from slipping   Use a bath mat if you do not have carpet in the bathroom  This will prevent you from falling when you step out of the bath or shower  Use a shower seat so you do not need to stand while you shower  Sit on the toilet or a chair in your bathroom to dry yourself and put on clothing  This will prevent you from losing your balance from drying or dressing yourself while you are standing  · Keep paths clear  Remove books, shoes, and other objects from walkways and stairs  Place cords for telephones and lamps out of the way so that you do not need to walk over them  Tape them down if you cannot move them  Remove small rugs  If you cannot remove a rug, secure it with double-sided tape  This will prevent you from tripping  · Install bright lights in your home  Use night lights to help light paths to the bathroom or kitchen  Always turn on the light before you start walking  · Keep items you use often on shelves within reach  Do not use a step stool to help you reach an item  · Paint or place reflective tape on the edges of your stairs  This will help you see the stairs better  Follow up with your healthcare provider as directed:  Write down your questions so you remember to ask them during your visits  © 2017 [address] [employer] Information is for End User's use only and may not be sold, redistributed or otherwise used for commercial purposes  All illustrations and images included in CareNotes® are the copyrighted property of [employer]  or [employer]  The above information is an  only  It is not intended as medical advice for individual conditions or treatments  Talk to your doctor, nurse or pharmacist before following any medical regimen to see if it is safe and effective for you

## 2018-01-25 ENCOUNTER — TELEPHONE (OUTPATIENT)
Dept: PALLIATIVE MEDICINE | Facility: HOSPITAL | Age: 83
End: 2018-01-25

## 2018-02-01 ENCOUNTER — APPOINTMENT (OUTPATIENT)
Dept: WOUND CARE | Facility: HOSPITAL | Age: 83
End: 2018-02-01
Attending: PODIATRIST
Payer: MEDICARE

## 2018-02-01 PROCEDURE — 99213 OFFICE O/P EST LOW 20 MIN: CPT | Performed by: PODIATRIST

## 2018-02-01 NOTE — TELEPHONE ENCOUNTER
I think it would be best for pt to be seen by wound care in follow up  They have seen her and are treating her so they should see her again to offer further recommendations   Thanks

## 2018-02-02 NOTE — TELEPHONE ENCOUNTER
I spoke with Rick Espino - called Smua Cheema back on her primary phone number and LM that Rick Espino wants her to continue to follow up with wound care weekly till they discharge her

## 2018-02-02 NOTE — TELEPHONE ENCOUNTER
I need to remember to Schedule Vera Donahue with  Follow up HV end of March last apt of the day- I will reach out to her  Patient is scd 3/21/18 with Parminder Esquivel at home and will be following with wound care till she is discharged

## 2018-02-16 ENCOUNTER — TELEPHONE (OUTPATIENT)
Dept: PALLIATIVE MEDICINE | Facility: HOSPITAL | Age: 83
End: 2018-02-16

## 2018-02-22 ENCOUNTER — APPOINTMENT (OUTPATIENT)
Dept: WOUND CARE | Facility: HOSPITAL | Age: 83
End: 2018-02-22
Attending: PODIATRIST
Payer: MEDICARE

## 2018-02-22 PROCEDURE — 99213 OFFICE O/P EST LOW 20 MIN: CPT | Performed by: PODIATRIST

## 2018-03-19 ENCOUNTER — OFFICE VISIT (OUTPATIENT)
Dept: FAMILY MEDICINE CLINIC | Facility: CLINIC | Age: 83
End: 2018-03-19
Payer: MEDICARE

## 2018-03-19 VITALS
OXYGEN SATURATION: 98 % | SYSTOLIC BLOOD PRESSURE: 108 MMHG | HEART RATE: 61 BPM | DIASTOLIC BLOOD PRESSURE: 48 MMHG | TEMPERATURE: 97.7 F

## 2018-03-19 DIAGNOSIS — F03.91 DEMENTIA WITH BEHAVIORAL DISTURBANCE, UNSPECIFIED DEMENTIA TYPE (HCC): Primary | ICD-10-CM

## 2018-03-19 DIAGNOSIS — F33.41 RECURRENT MAJOR DEPRESSIVE DISORDER, IN PARTIAL REMISSION (HCC): Primary | ICD-10-CM
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
## 2018-03-19 PROBLEM — R53.81 DEBILITY: Status: ACTIVE | Noted: 2017-10-24

## 2018-03-19 PROBLEM — M54.50 CHRONIC LOW BACK PAIN: Status: ACTIVE | Noted: 2017-05-18

## 2018-03-19 PROBLEM — L30.9 ECZEMA: Status: ACTIVE | Noted: 2017-10-19

## 2018-03-19 PROBLEM — L97.409 HEEL ULCER (HCC): Status: ACTIVE | Noted: 2017-12-29

## 2018-03-19 PROBLEM — G89.29 CHRONIC LOW BACK PAIN: Status: ACTIVE | Noted: 2017-05-18

## 2018-03-19 PROCEDURE — 99214 OFFICE O/P EST MOD 30 MIN: CPT | Performed by: FAMILY MEDICINE

## 2018-03-19 RX ORDER — ASCORBIC ACID 250 MG
1 TABLET,CHEWABLE ORAL DAILY
COMMUNITY
Start: 2017-11-29

## 2018-03-19 RX ORDER — CITALOPRAM 10 MG/1
TABLET ORAL
Qty: 30 TABLET | Refills: 5 | Status: SHIPPED | OUTPATIENT
Start: 2018-03-19 | End: 2018-10-10 | Stop reason: SDUPTHER

## 2018-03-19 RX ORDER — MEMANTINE HYDROCHLORIDE 14 MG/1
CAPSULE, EXTENDED RELEASE ORAL
COMMUNITY
Start: 2018-03-12 | End: 2018-03-19

## 2018-03-19 RX ORDER — TRIAMCINOLONE ACETONIDE 1 MG/G
CREAM TOPICAL
COMMUNITY
Start: 2017-10-19 | End: 2018-03-19

## 2018-03-19 RX ORDER — DOXYCYCLINE HYCLATE 50 MG/1
1 CAPSULE, GELATIN COATED ORAL DAILY
Refills: 5 | COMMUNITY
Start: 2017-12-13 | End: 2018-04-21 | Stop reason: SDUPTHER

## 2018-03-19 NOTE — PROGRESS NOTES
Assessment/Plan:    No problem-specific Assessment & Plan notes found for this encounter  Diagnoses and all orders for this visit:    Dementia with behavioral disturbance, unspecified dementia type    At this point the Namenda probably is not helping though I did advise the daughter, Barrett Florez, that is up to them whether not they want to discontinue it  She can request crisis her mother pharmacies to see if this would be any cheaper anywhere else  Additionally, she may need medications in addition to the citalopram for her behavioral disturbances  Above all, though, the patient does likely need to be in a nursing home  She has become increasingly agitated and paranoid with her dementia  She is verbally abusive to her daughter at this point  Her daughter does understand this is from the dementia but is also affecting her significantly  The patient and her family have seen Dr Sundeep Alicia for many years  He was with them through the patient's 's death from Huntingtons disease  I asked the patient if it was okay if I discussed this all with him in she was very much on board with this plan  After the patient and her daughter left the office I did have a long conversation about this with Dr Sundeep Alicia all and he has agreed to see them this Saturday to discuss permanent placement options  More than half of this 30 minute visit spent counseling and coordinating care  Subjective:      Patient ID: Norma Galdamez is a 80 y o  female      The pt is here today with her dtr Barrett Florez  She has a dx of dementia  She is on Namenda  Has been on it for years   They're not sure it is helping  It is very expensive and increasingly hard to get - the pharmacy had to order it last time and it took a week  She also has increasing anxiety and depression  Has been angry, has mood changes   She is on citalopram  Has been on it for 2 years  Since starting it her dtr felt initially that perhapts there is generally more of a calmness to her demeanor  But then she still has triggers - subjects that come up that causes increased anxiety and depression  More paranoia then recently as well, paranoid about people talking about her etc   She's also had sleep disturbances, changes in sleep habits, getting up in the middle of the night  She has had more and more falls over the years  She has palliative care coming to the house and has had PT and OT at home  When the therapy is over, though, some of the same tendencies come back and the falls increase again  Sitting too long in one spot happens again  She then develops ulcers  It has become a pattern    Her dtr Hilary Suh is the only family member who will take care of her  She lives with her   The pt is demeaning and nasty to her dtr, says terrible things to her     The pt told me 5 times while in with the 2 of them that I should know Kelly Wylie family history about her biological mother  Hilary Suh was adopted - biological mom had bipolar d/o and drug issues          The following portions of the patient's history were reviewed and updated as appropriate: allergies, current medications, past family history, past medical history, past social history, past surgical history and problem list     Review of Systems      Objective:  Vitals:    03/19/18 1319   BP: (!) 108/48   BP Location: Left arm   Pulse: 61   Temp: 97 7 °F (36 5 °C)   SpO2: 98%      Physical Exam   Constitutional:   The pt is in a wheelchair, in NAD   Psychiatric: Her affect is angry, labile and inappropriate  She is agitated  Cognition and memory are impaired  She expresses inappropriate judgment  She exhibits abnormal recent memory

## 2018-03-22 ENCOUNTER — TELEPHONE (OUTPATIENT)
Dept: FAMILY MEDICINE CLINIC | Facility: CLINIC | Age: 83
End: 2018-03-22

## 2018-03-22 NOTE — TELEPHONE ENCOUNTER
I returned Pauline's call - I reassured her that I did speak with Dr Alba Rivera about her mom entering a nursing home  Nazario Cruz has many concerns - does not think her mom will "agree to it"  While she says she has read a lot on caregivers and dementia she seems to have a poor understanding of the fact that she is going to have to make this decision  Her mom does not have the mental capacity at this point to make that decision  She spoke with her brother who is in total disagreement  He told her how she should be caring for her mom - but he does not want to do so  Nazario Cruz has questions about pre-paying for a  and respite care and what paperwork needs to be completed - I told her that this was not my area and I know nothing about it  Dr Alba Rivera may not even have all of these answers but may be able to direct her to the person to answer these questions  Finally, she is also very concerned about talking about these things in front of her mom because it gets her mom so agitated and she still has to care for her  I reassured her that Dr Alba Rivera not only knows her mom well but has worked with patients with dementia and their families for years  They do have an appt to see Dr Alba Rivera Saturday

## 2018-03-24 ENCOUNTER — OFFICE VISIT (OUTPATIENT)
Dept: FAMILY MEDICINE CLINIC | Facility: CLINIC | Age: 83
End: 2018-03-24
Payer: MEDICARE

## 2018-03-24 VITALS
TEMPERATURE: 96.4 F | HEART RATE: 66 BPM | OXYGEN SATURATION: 95 % | DIASTOLIC BLOOD PRESSURE: 60 MMHG | SYSTOLIC BLOOD PRESSURE: 110 MMHG

## 2018-03-24 DIAGNOSIS — G30.1 LATE ONSET ALZHEIMER'S DISEASE WITH BEHAVIORAL DISTURBANCE (HCC): ICD-10-CM

## 2018-03-24 DIAGNOSIS — R41.0 CONFUSION: Primary | ICD-10-CM

## 2018-03-24 DIAGNOSIS — R31.9 HEMATURIA, UNSPECIFIED TYPE: ICD-10-CM

## 2018-03-24 DIAGNOSIS — I35.0 AORTIC STENOSIS, SEVERE: ICD-10-CM

## 2018-03-24 DIAGNOSIS — F02.81 LATE ONSET ALZHEIMER'S DISEASE WITH BEHAVIORAL DISTURBANCE (HCC): ICD-10-CM

## 2018-03-24 LAB
SL AMB  POCT GLUCOSE, UA: NEGATIVE
SL AMB LEUKOCYTE ESTERASE,UA: ABNORMAL
SL AMB POCT BILIRUBIN,UA: NEGATIVE
SL AMB POCT BLOOD,UA: ABNORMAL
SL AMB POCT CLARITY,UA: ABNORMAL
SL AMB POCT COLOR,UA: YELLOW
SL AMB POCT KETONES,UA: NEGATIVE
SL AMB POCT NITRITE,UA: POSITIVE
SL AMB POCT PH,UA: 6
SL AMB POCT SPECIFIC GRAVITY,UA: 1.02
SL AMB POCT URINE PROTEIN: NEGATIVE
SL AMB POCT UROBILINOGEN: 0.2

## 2018-03-24 PROCEDURE — 99213 OFFICE O/P EST LOW 20 MIN: CPT | Performed by: FAMILY MEDICINE

## 2018-03-24 PROCEDURE — 81002 URINALYSIS NONAUTO W/O SCOPE: CPT | Performed by: FAMILY MEDICINE

## 2018-03-24 NOTE — PATIENT INSTRUCTIONS
Alzheimer Disease   AMBULATORY CARE:   Alzheimer disease  is an irreversible brain disorder that results in the gradual loss of memory  Parts of the brain die and cannot make normal levels of brain chemicals  The disease usually starts at about age 72 to 79 years but can start earlier  Common symptoms include the following:   · Mild AD:  Early AD symptoms may be minor and last from 1 to 3 years  ¨ Memory loss: Remembering what happened years ago, but unable to remember things from yesterday or forgetting the names of common things or people you know    ¨ Confusion about what month or season it is    ¨ Forgetting to brush your teeth or comb your hair    ¨ Difficulty taking care of your home or finances or difficulty making decisions    ¨ Loss of interest in your usual activities    ¨ Feeling depressed, angry, or confused about the changes you notice    · Moderate AD:      ¨ Problems choosing what clothes to wear, doing simple jobs, or caring for your self    ¨ Unable to recognize people familiar to you    ¨ Difficulty finding words to say what you mean, trouble talking in normal sentences, or speech that is difficult to understand    ¨ Feeling anxious, restless, and agitated at night and seeming depressed or worried    ¨ Difficulty controlling emotions and becoming loud, violent, and hard to control    ¨ Becoming confused and wandering off or pacing    ¨ Unable to plan and follow through with activities    ¨ Thinking something is true even though it is not, seeing things that are not actually there, or inability to control when you urinate    · Severe AD:      ¨ Complete loss of memory    ¨ Complete loss of speech    ¨ Loss of bladder and bowel control    ¨ Finding it very difficult to walk    ¨ Becoming angry and out of control or aggressive and destroying things    ¨ Unable to care for yourself and needing someone to take care of you  Contact your healthcare provider if:   · You have a fever      · You have a rash or your skin is itchy and swollen  · You are depressed and have difficulty coping with your symptoms  · You have questions or concerns about your condition or care  Treatment for AD  may include medicines to help increase the amount of normal chemicals in your brain or slow the death of brain cells  You may also need medicines to help control your mood or help you feel less nervous or restless  Medicines to help with bladder and bowel control or to help you sleep better may be needed  Some people may be given medicines to control delusions (false beliefs), hallucinations, or violent behaviors  Counseling can teach you ways to cope with your disease  Care for someone who has AD:   · Keep activities the same from day to day  People with AD do best with daily routines  Take breaks often  Save difficult activities for when the person seems the most alert  Choose activities that the person is interested in doing  Help the person get started and try to break difficult activities into small steps  · Keep mealtimes at the same time each day  It is best to limit food choices  Eating patterns may change in people with AD  It may help to have the person eat small meals and snacks  Ask healthcare providers about giving vitamins if you do not think the person is eating enough  · Get regular exercise  Regular exercise may help decrease depression and anxiety and improve sleep  Walking is a good exercise for people with AD  Check local senior centers for information about group exercise activities  · Learn to recognize pain or discomfort  Noisy breathing or rigid body movements may be a sign of pain  A sad or scared look may also mean the person is having discomfort  The person may also constantly make certain noises when he or she is in pain  · Provide personal care  Make sure to check the water temperature of the bath or shower so they do not burn themselves   It may help to choose and lay out clothes each night for them to wear the next day  Make sure to brush the person's teeth or dentures daily  Do not forget to take the person to the dentist for exams  · Provide a safe environment  Go through the house and remove things that could cause accidents  Make sure household  and medicines are out of reach  You may need to remove the stove burner knobs and hide matches and lighters to prevent injury  Remove loose rugs to prevent falls  Keep doors and windows tightly closed to prevent wandering or other injuries  If the person smokes, make sure cigarettes are always put out  · Keep the person with AD active and awake during the day  Limit how much liquid the person drinks in the evening  This may help him or her sleep through the night  Make sure that the person goes to bed at the same time every night  · Use short words or sentences when speaking to the person with AD  Call the person by name and speak slowly, clearly, and calmly  Use short words and sentences  Use the same words if you have to repeat yourself  Do not ask too many questions  Do not argue with the person  · Create a bathroom schedule  This may mean reminding the person to urinate every 4 hours  Be aware of the person's bowel movement routine and keep it the same  · Prevent wandering  New door locks may be needed to keep the person from going outside alone  An alarm system near doors may tell you if the person wanders out  Have the person wear a necklace or bracelet with their name and phone number on it in case they wander away from you and are found by someone else  Follow up with your healthcare provider as directed:  Ask someone to go with you to help you remember what your healthcare provider tells you  The person can take notes for you during the visit and go over the notes with you later  Write down your questions so you remember to ask them during your visits    © 2017 [facility]  is for End User's use only and may not be sold, redistributed or otherwise used for commercial purposes  All illustrations and images included in CareNotes® are the copyrighted property of [employer] M [employer]  or [employer]  The above information is an  only  It is not intended as medical advice for individual conditions or treatments  Talk to your doctor, nurse or pharmacist before following any medical regimen to see if it is safe and effective for you

## 2018-03-24 NOTE — PROGRESS NOTES
Assessment/Plan:    No problem-specific Assessment & Plan notes found for this encounter  Diagnoses and all orders for this visit:    Confusion  -     POCT urine dip  -     Urine culture    Hematuria, unspecified type  -     Urine culture    Late onset Alzheimer's disease with behavioral disturbance   I am in favor of seeking placement for this patient  I suggested that they start with a respite stay and then consider long-term care  This will maximize the safety of the patient and the mental health of her daughter  Aortic stenosis, severe          Subjective:      Patient ID: Michelle Zimmerman is a 80 y o  female  The patient is here with her daughter  She is becoming more more difficult to handle at home  She is belligerent and has some paranoia  She targets her daughter telling her that she does a lot of her enough to take care of her  Her daughter is somewhat overwhelmed  She has no help from her siblings  Is having a big effect on her own personal life  The following portions of the patient's history were reviewed and updated as appropriate: allergies, current medications, past family history, past medical history, past social history, past surgical history and problem list     Review of Systems   Constitutional: Negative for activity change, appetite change, chills, diaphoresis, fatigue and unexpected weight change  HENT: Negative for congestion, ear discharge, ear pain, hearing loss, nosebleeds and rhinorrhea  Eyes: Negative for pain, redness, itching and visual disturbance  Respiratory: Negative for cough, choking, chest tightness and shortness of breath  Cardiovascular: Negative for chest pain and leg swelling  Gastrointestinal: Negative for abdominal pain, blood in stool, constipation, diarrhea and nausea  Endocrine: Negative for cold intolerance, polydipsia and polyphagia  Genitourinary: Negative for dysuria, frequency, hematuria and urgency     Musculoskeletal: Negative for arthralgias, back pain, gait problem, joint swelling, neck pain and neck stiffness  Skin: Negative for color change and rash  Allergic/Immunologic: Negative for environmental allergies and food allergies  Neurological: Negative for dizziness, tremors, seizures, speech difficulty, numbness and headaches  Hematological: Negative for adenopathy  Does not bruise/bleed easily  Psychiatric/Behavioral: Positive for agitation, behavioral problems and confusion  Negative for dysphoric mood, hallucinations and self-injury  Objective:  Vitals:    03/24/18 0909   BP: 110/60   Pulse: 66   Temp: (!) 96 4 °F (35 8 °C)   SpO2: 95%      Physical Exam   Constitutional: She is oriented to person, place, and time  Cachectic   HENT:   Head: Normocephalic and atraumatic  Right Ear: External ear normal    Left Ear: External ear normal    Mouth/Throat: No oropharyngeal exudate  Poor dentition   Eyes: Conjunctivae and EOM are normal  Pupils are equal, round, and reactive to light  Right eye exhibits no discharge  Left eye exhibits no discharge  No scleral icterus  Neck: Normal range of motion  Neck supple  No thyromegaly present  Cardiovascular: Normal rate and regular rhythm  Exam reveals no gallop  No murmur heard  Murmur of aortic stenosis   Pulmonary/Chest: Effort normal and breath sounds normal  No respiratory distress  She has no wheezes  She has no rales  Abdominal: Soft  Bowel sounds are normal  She exhibits no mass  There is no rebound and no guarding  Musculoskeletal: Normal range of motion  She exhibits no edema, tenderness or deformity  Lymphadenopathy:     She has no cervical adenopathy  Neurological: She is alert and oriented to person, place, and time  She has normal reflexes  No cranial nerve deficit  Coordination normal    Skin: Skin is warm and dry  No rash noted     Psychiatric:   Confused and suspicious

## 2018-03-27 NOTE — PROGRESS NOTES
Pt wanted to know about resbit and long term care?  That way she can do different legal things that she wants to do like pre pray for her

## 2018-04-09 DIAGNOSIS — F02.80 SDAT (SENILE DEMENTIA OF ALZHEIMER'S TYPE) (HCC): Primary | ICD-10-CM

## 2018-04-09 DIAGNOSIS — G30.1 SDAT (SENILE DEMENTIA OF ALZHEIMER'S TYPE) (HCC): Primary | ICD-10-CM

## 2018-04-09 RX ORDER — MEMANTINE HYDROCHLORIDE 14 MG/1
CAPSULE, EXTENDED RELEASE ORAL
Qty: 30 CAPSULE | Refills: 5 | Status: SHIPPED | OUTPATIENT
Start: 2018-04-09 | End: 2018-04-10

## 2018-04-09 NOTE — TELEPHONE ENCOUNTER
MARLENE CALLED REGARDING MOM'S NAMENDA  THERE ISN'T MUCH OF A PRICE DIFFERENCE ANYWHERE  SHE DID HEAR THAT THE MEDICATION JUST WENT GENERIC   CAN YOU PLEASE SEND THE GENERIC INTO THE [facility]? THANK YOU

## 2018-04-10 RX ORDER — MEMANTINE HYDROCHLORIDE 10 MG/1
10 TABLET ORAL DAILY
Qty: 30 TABLET | Refills: 5 | Status: SHIPPED | OUTPATIENT
Start: 2018-04-10 | End: 2018-07-19 | Stop reason: HOSPADM

## 2018-04-11 ENCOUNTER — IN HOME VISIT (OUTPATIENT)
Dept: PALLIATIVE MEDICINE | Facility: HOSPITAL | Age: 83
End: 2018-04-11
Payer: MEDICARE

## 2018-04-11 VITALS
DIASTOLIC BLOOD PRESSURE: 60 MMHG | HEART RATE: 58 BPM | SYSTOLIC BLOOD PRESSURE: 98 MMHG | RESPIRATION RATE: 16 BRPM | TEMPERATURE: 97.6 F
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
## 2018-04-11 DIAGNOSIS — R26.9 GAIT DISTURBANCE: ICD-10-CM

## 2018-04-11 DIAGNOSIS — F02.81 LATE ONSET ALZHEIMER'S DISEASE WITH BEHAVIORAL DISTURBANCE (HCC): Primary | ICD-10-CM

## 2018-04-11 DIAGNOSIS — G30.1 LATE ONSET ALZHEIMER'S DISEASE WITH BEHAVIORAL DISTURBANCE (HCC): Primary | ICD-10-CM

## 2018-04-11 DIAGNOSIS — I35.0 AORTIC STENOSIS, SEVERE: ICD-10-CM

## 2018-04-11 PROBLEM — L97.409 HEEL ULCER (HCC): Status: RESOLVED | Noted: 2017-12-29 | Resolved: 2018-04-11

## 2018-04-11 PROCEDURE — 99350 HOME/RES VST EST HIGH MDM 60: CPT | Performed by: NURSE PRACTITIONER

## 2018-04-11 RX ORDER — RISPERIDONE 0.25 MG/1
0.25 TABLET, FILM COATED ORAL 2 TIMES DAILY
Qty: 60 TABLET | Refills: 0 | Status: SHIPPED | OUTPATIENT
Start: 2018-04-11 | End: 2018-05-02 | Stop reason: SDUPTHER

## 2018-04-11 NOTE — PROGRESS NOTES
Palliative and Supportive Care   Carl Austin 80 y o  female [id number]    Assessment/Plan:  1  Late onset Alzheimer's disease with behavioral disturbance    2  Aortic stenosis, severe    3  Fall, sequela    4  Gait disturbance        Requested Prescriptions     Signed Prescriptions Disp Refills    risperiDONE (RisperDAL) 0 25 mg tablet 60 tablet 0     Sig: Take 1 tablet (0 25 mg total) by mouth 2 (two) times a day     There are no discontinued medications  Dementia with behavioral disturbances:   - Continue Namenda    - Continue citalopram   - Start low dose risperidone 0 25 mg twice a day     A lot of time was spent counseling pt's daughter Amy on ways to reduce her caregiver burden and stress  She was encouraged to redirect instead of correcting her mom to help prevent escalation of her mom's anger and agitation  Strongly encouraged respite care, however given pt's paranoia family may benefit from hiring 24 hours caregivers if financially feasible  I will have one of our social workers reach out to Amy to discuss other resources that may be beneficial and give her information on care giver support groups  We also discussed at length hospice criteria for dementia  Pt is still quite functional with her dementia and is 6 C on the fast scale and does not meet hospice criteria        60 minutes were spent face to face with Carl Austin and her daughter with greater than 50% of the time spent in counseling or coordination of care including discussions of pathogenesis of diagnosis, diagnostic results, impression, and recommendations, risks and benefits of treatment, treatment instructions, follow up requirements and patient and family counseling/involvement in care   All of the patient's questions were answered during this discussion  Follow up one month    Subjective:   Chief Complaint  Follow up visit for:  symptom management, disease process education and discussion of prognosis  Ms Dorene Sultana is a 51-year-old female with several chronic medical conditions including dementia with recent behaviors of agitation and parnoia, severe aortic stenosis, debility, recurring UTIs, frequent falls  She is being seen at home today due to her frailty and and for routine follow up as part of PALS at [facility] program   Her daughter Patricia Lo was present  She does recognize me and remembers that I have visited her before  She denies pain and has no complaints  She has short term memory loss and can not recall if she has had any troubles since my last visit  However, Patricia Lo states that her mom did sustain a fall in March but did not have any serious injury  Jamiljohny Bach looks at her daughter with disdain and states "she is trying to get rid of me"  Pt appears agitated and upset and states her daughter Patricia Lo "wants to put me in a home, in fact she did put me in a home but my son came and got me out" Pt constantly refers to Patricia Lo as her "adopted daughter who's biological mother was in an asylum"  Pt repeats herself frequently and is forgetful  Per Patricia Lo, pt has become increasingly more belligerent and uncooperative  She has no help from her brother in caring for her mom  She does have caregivers in place for a few hours a day  The stress of caring for her mom is starting to take it's toll on her, she is overwhelmed and tearful             The following portions of the medical history were reviewed: past medical history, problem list, medication list, and social history  Current Outpatient Prescriptions:     Ascorbic Acid (VITAMIN C) 250 MG CHEW, Chew 1 tablet daily, Disp: , Rfl:     aspirin (ECOTRIN LOW STRENGTH) 81 mg EC tablet, Take 81 mg by mouth every other day, Disp: , Rfl:     Calcium-Magnesium-Vitamin D (CALCIUM 500 PO), Take by mouth, Disp: , Rfl:     citalopram (CeleXA) 10 mg tablet, TAKE 1 TABLET BY MOUTH EVERY DAY, Disp: 30 tablet, Rfl: 5    CRANBERRY PO, Take 1 tablet by mouth daily  , Disp: , Rfl:     Docusate Sodium (COLACE CLEAR PO), Take by mouth, Disp: , Rfl:     famotidine (PEPCID) 10 mg tablet, Take 10 mg by mouth daily  , Disp: , Rfl:     ferrous gluconate (FERGON) 324 mg tablet, Take 1 tablet by mouth daily, Disp: , Rfl: 5    Lactobacillus (ACIDOPHILUS/BIFIDUS PO), Take by mouth, Disp: , Rfl:     letrozole (FEMARA) 2 5 mg tablet, Take 2 5 mg by mouth daily  , Disp: , Rfl:     memantine (NAMENDA) 10 mg tablet, Take 1 tablet (10 mg total) by mouth daily, Disp: 30 tablet, Rfl: 5    risperiDONE (RisperDAL) 0 25 mg tablet, Take 1 tablet (0 25 mg total) by mouth 2 (two) times a day, Disp: 60 tablet, Rfl: 0  Review of Systems   Musculoskeletal: Positive for gait problem  Falls   Psychiatric/Behavioral: Positive for agitation, behavioral problems and confusion  All other systems reviewed and are negative  Objective:  Vital Signs  BP 98/60   Pulse 58   Temp 97 6 °F (36 4 °C)   Resp 16    Physical Exam   Constitutional: She is oriented to person, place, and time  No distress  Alert, elderly frail appearing in no acute distress   HENT:   Head: Normocephalic and atraumatic  Mouth/Throat: Oropharynx is clear and moist    Eyes: Pupils are equal, round, and reactive to light  No scleral icterus  Neck: Normal range of motion  Neck supple  Cardiovascular:   Murmur heard  Abdominal: Soft  Bowel sounds are normal  She exhibits no distension  Musculoskeletal: She exhibits no edema  Ambulates with walker   Neurological: She is alert and oriented to person, place, and time  Skin: Skin is warm and dry  She is not diaphoretic  Psychiatric: Her affect is angry  She is agitated  Thought content is paranoid  Cognition and memory are impaired

## 2018-04-13 ENCOUNTER — DOCUMENTATION (OUTPATIENT)
Dept: PALLIATIVE MEDICINE | Facility: HOSPITAL | Age: 83
End: 2018-04-13

## 2018-04-13 ENCOUNTER — DOCUMENTATION (OUTPATIENT)
Dept: CASE MANAGEMENT | Facility: HOSPITAL | Age: 83
End: 2018-04-13

## 2018-04-13 ENCOUNTER — SOCIAL WORK (OUTPATIENT)
Dept: CASE MANAGEMENT | Facility: HOSPITAL | Age: 83
End: 2018-04-13

## 2018-04-13 NOTE — PROGRESS NOTES
Palliative LSW received task to call pt's daughter, Crescencio Saxena to provide support and help with information for caregiver support groups  Phoned [facility] and placed referral to [place] ([phone])  She will call daughter and set up an appointment  LSW then called Sharlene De Los Santos at home  She expressed she is very overwhelmed with pt's behaviors related to dementia  Pt  Appears to become agitated and paranoid very quickly, per daughter's report and [facility] notes from yesterday  Sharlene De Los Santos states she has tried to seek support from her family and [employer], however her brother is not realistic about their mother's condition and will only agree to visit 1x/week socially  She has found [employer] members expressing judgement to her about how she is taking care of her mother and that "she hasn't asked for help in the correct way "  LSW provided online support websites and other services to Sharlene De Los Santos (Lucinda Malaika  org, www caregiveraction  org, toll-free phone call support through Rubén Galdamez at 1-194.768.1317, and a local group in [facility] called [facility] at [employer])  Sharlene De Los Santos was appreciative of help  Provided contact information if she should have further concerns or questions  Updated NICOLETTE Oneill of same

## 2018-04-21 DIAGNOSIS — D50.9 IRON DEFICIENCY ANEMIA, UNSPECIFIED IRON DEFICIENCY ANEMIA TYPE: Primary | ICD-10-CM

## 2018-04-23 RX ORDER — DOXYCYCLINE HYCLATE 50 MG/1
CAPSULE, GELATIN COATED ORAL
Qty: 30 TABLET | Refills: 5 | Status: SHIPPED | OUTPATIENT
Start: 2018-04-23 | End: 2019-02-27 | Stop reason: SDUPTHER

## 2018-05-02 ENCOUNTER — IN HOME VISIT (OUTPATIENT)
Dept: PALLIATIVE MEDICINE | Facility: HOSPITAL | Age: 83
End: 2018-05-02
Payer: MEDICARE

## 2018-05-02 VITALS — SYSTOLIC BLOOD PRESSURE: 100 MMHG | DIASTOLIC BLOOD PRESSURE: 60 MMHG | HEART RATE: 60 BPM | TEMPERATURE: 98.4 F
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
## 2018-05-02 DIAGNOSIS — G30.1 LATE ONSET ALZHEIMER'S DISEASE WITH BEHAVIORAL DISTURBANCE (HCC): Primary | ICD-10-CM

## 2018-05-02 DIAGNOSIS — I35.0 AORTIC STENOSIS, SEVERE: ICD-10-CM

## 2018-05-02 DIAGNOSIS — F02.81 LATE ONSET ALZHEIMER'S DISEASE WITH BEHAVIORAL DISTURBANCE (HCC): Primary | ICD-10-CM

## 2018-05-02 DIAGNOSIS — R54 FRAILTY: ICD-10-CM

## 2018-05-02 PROCEDURE — 99349 HOME/RES VST EST MOD MDM 40: CPT | Performed by: NURSE PRACTITIONER

## 2018-05-02 RX ORDER — RISPERIDONE 0.25 MG/1
0.25 TABLET, FILM COATED ORAL 2 TIMES DAILY
Qty: 60 TABLET | Refills: 2 | Status: SHIPPED | OUTPATIENT
Start: 2018-05-02 | End: 2018-08-28 | Stop reason: SDUPTHER

## 2018-05-02 NOTE — PROGRESS NOTES
Palliative and Supportive Care   Dino Llamas 80 y o  female [id number]    Assessment/Plan:  1  Late onset Alzheimer's disease with behavioral disturbance    2  Aortic stenosis, severe    3  Fall, sequela    4  Frailty        Requested Prescriptions     Signed Prescriptions Disp Refills    risperiDONE (RisperDAL) 0 25 mg tablet 60 tablet 2     Sig: Take 1 tablet (0 25 mg total) by mouth 2 (two) times a day     Medications Discontinued During This Encounter   Medication Reason    risperiDONE (RisperDAL) 0 25 mg tablet Reorder        Dementia with behavioral disturbances:   - Continue Namenda    - Continue citalopram   - Contiune risperidone 0 25 mg twice a day     Falls/Frailty:   - She is at high risk for injury given her history of falls and frailty  She was instructed to ask for help and always use her walker when ambulating  Family does have caregivers in place for 4 hours a day/7 days a week  I support respite care for her as she is not safe to be alone for any long period of time  60 minutes were spent face to face with Dino Llamas and her daughter with greater than 50% of the time spent in counseling or coordination of care including discussions of pathogenesis of diagnosis, diagnostic results, impression, and recommendations, risks and benefits of treatment, treatment instructions, follow up requirements and patient and family counseling/involvement in care   All of the patient's questions were answered during this discussion  Follow up two month    Subjective:   Chief Complaint  Follow up visit for:  symptom management, disease process education and discussion of prognosis  Ms Keara Carver is a 66-year-old female with several chronic medical conditions including dementia with recent behaviors of agitation and parnoia, severe aortic stenosis, debility, recurring UTIs, frequent falls    At last visit she was started on low dose Risperidone and she is being seen at home today to assess how she is tolerating and responding to the medication  She appears alert and pleasant, not agitated today  Remains forgetful  Per daughter Tavia Vargas, she has noticed an improvement in her mood and behaviors since starting Risperidone  She is now entertaining the idea of temporary placement at a SNF for respite while her daughter travels at the end of June stating "it will be my vacation"  She had one fall since last visit , no injuries reported  Denies any unusual aches or pains  No shortness of breath, chest pain or palpitations  Her appetite is good  Denies any bowel or bladder issues  The following portions of the medical history were reviewed: past medical history, problem list, medication list, and social history  Current Outpatient Prescriptions:     Ascorbic Acid (VITAMIN C) 250 MG CHEW, Chew 1 tablet daily, Disp: , Rfl:     aspirin (ECOTRIN LOW STRENGTH) 81 mg EC tablet, Take 81 mg by mouth every other day, Disp: , Rfl:     Calcium-Magnesium-Vitamin D (CALCIUM 500 PO), Take by mouth, Disp: , Rfl:     citalopram (CeleXA) 10 mg tablet, TAKE 1 TABLET BY MOUTH EVERY DAY, Disp: 30 tablet, Rfl: 5    CRANBERRY PO, Take 1 tablet by mouth daily  , Disp: , Rfl:     Docusate Sodium (COLACE CLEAR PO), Take by mouth, Disp: , Rfl:     famotidine (PEPCID) 10 mg tablet, Take 10 mg by mouth daily  , Disp: , Rfl:     ferrous gluconate (FERGON) 324 mg tablet, TAKE 1 TABLET DAILY, Disp: 30 tablet, Rfl: 5    Lactobacillus (ACIDOPHILUS/BIFIDUS PO), Take by mouth, Disp: , Rfl:     letrozole (FEMARA) 2 5 mg tablet, Take 2 5 mg by mouth daily  , Disp: , Rfl:     memantine (NAMENDA) 10 mg tablet, Take 1 tablet (10 mg total) by mouth daily, Disp: 30 tablet, Rfl: 5    Memantine HCl ER 14 MG CP24, Take 1 capsule by mouth daily, Disp: , Rfl: 5    risperiDONE (RisperDAL) 0 25 mg tablet, Take 1 tablet (0 25 mg total) by mouth 2 (two) times a day, Disp: 60 tablet, Rfl: 2  Review of Systems   Musculoskeletal: Positive for gait problem  Falls   Psychiatric/Behavioral: Positive for confusion  Forgetful   All other systems reviewed and are negative  Objective:  Vital Signs  /60   Pulse 60   Temp 98 4 °F (36 9 °C)    Physical Exam   Constitutional: She is oriented to person, place, and time  No distress  Alert, pleasant  Elderly, frail appearing  HENT:   Head: Normocephalic and atraumatic  Eyes: Pupils are equal, round, and reactive to light  No scleral icterus  Neck: Normal range of motion  Neck supple  Cardiovascular:   Murmur heard  Pulmonary/Chest: Effort normal and breath sounds normal    Abdominal: Soft  Bowel sounds are normal  She exhibits no distension  Musculoskeletal: She exhibits no edema  Ambulates with walker   Neurological: She is alert and oriented to person, place, and time  Skin: Skin is warm and dry  She is not diaphoretic  Psychiatric: She has a normal mood and affect  Her behavior is normal  She exhibits abnormal recent memory

## 2018-06-14 ENCOUNTER — TELEPHONE (OUTPATIENT)
Dept: FAMILY MEDICINE CLINIC | Facility: CLINIC | Age: 83
End: 2018-06-14

## 2018-06-14 NOTE — TELEPHONE ENCOUNTER
TANIKA, when I gave Scar Segura the paperwork for [facility], she had me adjust a few things (nothing important)  I took a copy of the updated form incase you wanted to see it  She just wanted the colace changed to PRN, the iron and ASA to be every other day, and she also insisted that I list her cranberry, vitamin c, and lactobacillus supplements on the form  She is also writing you a note on a sticky note as I am typing this  I know that this is inappropriate but she insisted yet again  Just wanted to give you a heads up, thanks

## 2018-06-21 DIAGNOSIS — Z17.0 MALIGNANT NEOPLASM OF LEFT BREAST IN FEMALE, ESTROGEN RECEPTOR POSITIVE, UNSPECIFIED SITE OF BREAST (HCC): Primary | ICD-10-CM

## 2018-06-21 DIAGNOSIS — C50.912 MALIGNANT NEOPLASM OF LEFT BREAST IN FEMALE, ESTROGEN RECEPTOR POSITIVE, UNSPECIFIED SITE OF BREAST (HCC): Primary | ICD-10-CM

## 2018-06-21 RX ORDER — LETROZOLE 2.5 MG/1
2.5 TABLET, FILM COATED ORAL DAILY
Qty: 30 TABLET | Refills: 2 | Status: SHIPPED | OUTPATIENT
Start: 2018-06-21 | End: 2018-07-19 | Stop reason: HOSPADM

## 2018-07-02 ENCOUNTER — TELEPHONE (OUTPATIENT)
Dept: FAMILY MEDICINE CLINIC | Facility: CLINIC | Age: 83
End: 2018-07-02

## 2018-07-02 NOTE — TELEPHONE ENCOUNTER
I have not seen her mom at [facility] yet  I have not seen the results of the urine test either

## 2018-07-02 NOTE — TELEPHONE ENCOUNTER
Patient's daughter called asking about your recent asessment of her mother  She has a lot of questions about whether her mother needs physical therapy at [facility] or if she can bring her home and have therapy there  Also, asking about a urine dip and culture that was recently done on [address], I told her that she needs to call [facility] for these results as their system is not connected to ours so we wouldn't have them  Please advise on how you'd like to handle this situation

## 2018-07-16 ENCOUNTER — APPOINTMENT (EMERGENCY)
Dept: RADIOLOGY | Facility: HOSPITAL | Age: 83
DRG: 291 | End: 2018-07-16
Payer: MEDICARE

## 2018-07-16 ENCOUNTER — OFFICE VISIT (OUTPATIENT)
Dept: FAMILY MEDICINE CLINIC | Facility: CLINIC | Age: 83
End: 2018-07-16
Payer: MEDICARE

## 2018-07-16 ENCOUNTER — HOSPITAL ENCOUNTER (INPATIENT)
Facility: HOSPITAL | Age: 83
LOS: 3 days | Discharge: NURSING, REHAB OR LONG-TERM CARE | DRG: 291 | End: 2018-07-19
Attending: EMERGENCY MEDICINE | Admitting: INTERNAL MEDICINE
Payer: MEDICARE

## 2018-07-16 VITALS
TEMPERATURE: 98.5 F | OXYGEN SATURATION: 96 % | DIASTOLIC BLOOD PRESSURE: 60 MMHG | HEART RATE: 61 BPM | SYSTOLIC BLOOD PRESSURE: 110 MMHG

## 2018-07-16 DIAGNOSIS — J81.0 ACUTE PULMONARY EDEMA (HCC): Primary | ICD-10-CM

## 2018-07-16 DIAGNOSIS — I35.0 AORTIC STENOSIS, SEVERE: ICD-10-CM

## 2018-07-16 DIAGNOSIS — J90 PLEURAL EFFUSION: ICD-10-CM

## 2018-07-16 DIAGNOSIS — B36.9 FUNGAL DERMATITIS: ICD-10-CM

## 2018-07-16 DIAGNOSIS — C50.919 MALIGNANT NEOPLASM OF BREAST (HCC): ICD-10-CM

## 2018-07-16 DIAGNOSIS — G93.40 ENCEPHALOPATHY ACUTE: ICD-10-CM

## 2018-07-16 DIAGNOSIS — I50.32 CHRONIC DIASTOLIC CHF (CONGESTIVE HEART FAILURE) (HCC): Chronic | ICD-10-CM

## 2018-07-16 DIAGNOSIS — N39.0 UTI (URINARY TRACT INFECTION): Primary | ICD-10-CM

## 2018-07-16 PROBLEM — R06.02 SOB (SHORTNESS OF BREATH): Status: ACTIVE | Noted: 2018-07-16

## 2018-07-16 LAB
ALBUMIN SERPL BCP-MCNC: 3.4 G/DL (ref 3.5–5)
ALP SERPL-CCNC: 90 U/L (ref 46–116)
ALT SERPL W P-5'-P-CCNC: 26 U/L (ref 12–78)
AMORPH PHOS CRY URNS QL MICRO: ABNORMAL /HPF
ANION GAP SERPL CALCULATED.3IONS-SCNC: 4 MMOL/L (ref 4–13)
APTT PPP: 32 SECONDS (ref 24–36)
AST SERPL W P-5'-P-CCNC: 26 U/L (ref 5–45)
BACTERIA UR QL AUTO: ABNORMAL /HPF
BASOPHILS # BLD AUTO: 0.05 THOUSANDS/ΜL (ref 0–0.1)
BASOPHILS NFR BLD AUTO: 1 % (ref 0–1)
BILIRUB SERPL-MCNC: 0.3 MG/DL (ref 0.2–1)
BILIRUB UR QL STRIP: NEGATIVE
BUN SERPL-MCNC: 36 MG/DL (ref 5–25)
CALCIUM SERPL-MCNC: 8.7 MG/DL (ref 8.3–10.1)
CHLORIDE SERPL-SCNC: 103 MMOL/L (ref 100–108)
CLARITY UR: ABNORMAL
CLARITY, POC: NORMAL
CO2 SERPL-SCNC: 31 MMOL/L (ref 21–32)
COLOR UR: YELLOW
COLOR, POC: YELLOW
CREAT SERPL-MCNC: 1.29 MG/DL (ref 0.6–1.3)
EOSINOPHIL # BLD AUTO: 0.3 THOUSAND/ΜL (ref 0–0.61)
EOSINOPHIL NFR BLD AUTO: 5 % (ref 0–6)
ERYTHROCYTE [DISTWIDTH] IN BLOOD BY AUTOMATED COUNT: 13.5 % (ref 11.6–15.1)
EXT BILIRUBIN, UA: NEGATIVE
EXT BLOOD URINE: NORMAL
EXT GLUCOSE, UA: NEGATIVE
EXT KETONES: NEGATIVE
EXT NITRITE, UA: NEGATIVE
EXT PH, UA: 7
EXT PROTEIN, UA: NEGATIVE
EXT SPECIFIC GRAVITY, UA: 1.01
EXT UROBILINOGEN: 0.2
GFR SERPL CREATININE-BSD FRML MDRD: 35 ML/MIN/1.73SQ M
GLUCOSE SERPL-MCNC: 90 MG/DL (ref 65–140)
GLUCOSE UR STRIP-MCNC: NEGATIVE MG/DL
HCT VFR BLD AUTO: 35.1 % (ref 34.8–46.1)
HGB BLD-MCNC: 11.9 G/DL (ref 11.5–15.4)
HGB UR QL STRIP.AUTO: ABNORMAL
IMM GRANULOCYTES # BLD AUTO: 0.02 THOUSAND/UL (ref 0–0.2)
IMM GRANULOCYTES NFR BLD AUTO: 0 % (ref 0–2)
INR PPP: 1.06 (ref 0.86–1.17)
KETONES UR STRIP-MCNC: NEGATIVE MG/DL
LEUKOCYTE ESTERASE UR QL STRIP: ABNORMAL
LYMPHOCYTES # BLD AUTO: 1.9 THOUSANDS/ΜL (ref 0.6–4.47)
LYMPHOCYTES NFR BLD AUTO: 29 % (ref 14–44)
MCH RBC QN AUTO: 32.5 PG (ref 26.8–34.3)
MCHC RBC AUTO-ENTMCNC: 33.9 G/DL (ref 31.4–37.4)
MCV RBC AUTO: 96 FL (ref 82–98)
MONOCYTES # BLD AUTO: 1.02 THOUSAND/ΜL (ref 0.17–1.22)
MONOCYTES NFR BLD AUTO: 16 % (ref 4–12)
NEUTROPHILS # BLD AUTO: 3.31 THOUSANDS/ΜL (ref 1.85–7.62)
NEUTS SEG NFR BLD AUTO: 49 % (ref 43–75)
NITRITE UR QL STRIP: NEGATIVE
NON-SQ EPI CELLS URNS QL MICRO: ABNORMAL /HPF
NT-PROBNP SERPL-MCNC: 634 PG/ML
PH UR STRIP.AUTO: 7 [PH] (ref 4.5–8)
PLATELET # BLD AUTO: 205 THOUSANDS/UL (ref 149–390)
PMV BLD AUTO: 11.1 FL (ref 8.9–12.7)
POTASSIUM SERPL-SCNC: 4.5 MMOL/L (ref 3.5–5.3)
PROT SERPL-MCNC: 6.9 G/DL (ref 6.4–8.2)
PROT UR STRIP-MCNC: NEGATIVE MG/DL
PROTHROMBIN TIME: 13.2 SECONDS (ref 11.8–14.2)
RBC # BLD AUTO: 3.66 MILLION/UL (ref 3.81–5.12)
RBC #/AREA URNS AUTO: ABNORMAL /HPF
SODIUM SERPL-SCNC: 138 MMOL/L (ref 136–145)
SP GR UR STRIP.AUTO: 1.01 (ref 1–1.03)
TROPONIN I SERPL-MCNC: <0.02 NG/ML
UROBILINOGEN UR QL STRIP.AUTO: 0.2 E.U./DL
WBC # BLD AUTO: 6.6 THOUSAND/UL (ref 4.31–10.16)
WBC # BLD EST: NORMAL 10*3/UL
WBC #/AREA URNS AUTO: ABNORMAL /HPF

## 2018-07-16 PROCEDURE — 85730 THROMBOPLASTIN TIME PARTIAL: CPT

## 2018-07-16 PROCEDURE — 83880 ASSAY OF NATRIURETIC PEPTIDE: CPT

## 2018-07-16 PROCEDURE — 87086 URINE CULTURE/COLONY COUNT: CPT | Performed by: EMERGENCY MEDICINE

## 2018-07-16 PROCEDURE — 84484 ASSAY OF TROPONIN QUANT: CPT

## 2018-07-16 PROCEDURE — 93005 ELECTROCARDIOGRAM TRACING: CPT

## 2018-07-16 PROCEDURE — 99214 OFFICE O/P EST MOD 30 MIN: CPT | Performed by: FAMILY MEDICINE

## 2018-07-16 PROCEDURE — 85610 PROTHROMBIN TIME: CPT

## 2018-07-16 PROCEDURE — 80053 COMPREHEN METABOLIC PANEL: CPT

## 2018-07-16 PROCEDURE — 99223 1ST HOSP IP/OBS HIGH 75: CPT | Performed by: PHYSICIAN ASSISTANT

## 2018-07-16 PROCEDURE — 99284 EMERGENCY DEPT VISIT MOD MDM: CPT

## 2018-07-16 PROCEDURE — 81001 URINALYSIS AUTO W/SCOPE: CPT | Performed by: EMERGENCY MEDICINE

## 2018-07-16 PROCEDURE — 71046 X-RAY EXAM CHEST 2 VIEWS: CPT

## 2018-07-16 PROCEDURE — 85025 COMPLETE CBC W/AUTO DIFF WBC: CPT

## 2018-07-16 PROCEDURE — 36415 COLL VENOUS BLD VENIPUNCTURE: CPT

## 2018-07-16 RX ORDER — FUROSEMIDE 10 MG/ML
20 INJECTION INTRAMUSCULAR; INTRAVENOUS 2 TIMES DAILY
Status: DISCONTINUED | OUTPATIENT
Start: 2018-07-17 | End: 2018-07-17

## 2018-07-16 RX ORDER — LETROZOLE 2.5 MG/1
2.5 TABLET, FILM COATED ORAL DAILY
Status: DISCONTINUED | OUTPATIENT
Start: 2018-07-17 | End: 2018-07-18

## 2018-07-16 RX ORDER — DOCUSATE SODIUM 100 MG/1
100 CAPSULE, LIQUID FILLED ORAL DAILY
Status: DISCONTINUED | OUTPATIENT
Start: 2018-07-17 | End: 2018-07-19 | Stop reason: HOSPADM

## 2018-07-16 RX ORDER — FUROSEMIDE 10 MG/ML
20 INJECTION INTRAMUSCULAR; INTRAVENOUS ONCE
Status: COMPLETED | OUTPATIENT
Start: 2018-07-16 | End: 2018-07-16

## 2018-07-16 RX ORDER — ASCORBIC ACID 500 MG
250 TABLET ORAL DAILY
Status: DISCONTINUED | OUTPATIENT
Start: 2018-07-17 | End: 2018-07-19 | Stop reason: HOSPADM

## 2018-07-16 RX ORDER — CALCIUM CARBONATE 200(500)MG
1000 TABLET,CHEWABLE ORAL DAILY PRN
Status: DISCONTINUED | OUTPATIENT
Start: 2018-07-16 | End: 2018-07-19 | Stop reason: HOSPADM

## 2018-07-16 RX ORDER — ONDANSETRON 2 MG/ML
4 INJECTION INTRAMUSCULAR; INTRAVENOUS EVERY 6 HOURS PRN
Status: DISCONTINUED | OUTPATIENT
Start: 2018-07-16 | End: 2018-07-19 | Stop reason: HOSPADM

## 2018-07-16 RX ORDER — FUROSEMIDE 10 MG/ML
40 INJECTION INTRAMUSCULAR; INTRAVENOUS 2 TIMES DAILY
Status: DISCONTINUED | OUTPATIENT
Start: 2018-07-16 | End: 2018-07-16

## 2018-07-16 RX ORDER — CITALOPRAM 10 MG/1
10 TABLET ORAL DAILY
Status: DISCONTINUED | OUTPATIENT
Start: 2018-07-17 | End: 2018-07-19 | Stop reason: HOSPADM

## 2018-07-16 RX ORDER — FAMOTIDINE 20 MG/1
10 TABLET, FILM COATED ORAL DAILY
Status: DISCONTINUED | OUTPATIENT
Start: 2018-07-17 | End: 2018-07-19 | Stop reason: HOSPADM

## 2018-07-16 RX ORDER — CALCIUM CARBONATE 500(1250)
1 TABLET ORAL
Status: DISCONTINUED | OUTPATIENT
Start: 2018-07-17 | End: 2018-07-19 | Stop reason: HOSPADM

## 2018-07-16 RX ORDER — DOXYCYCLINE HYCLATE 50 MG/1
324 CAPSULE, GELATIN COATED ORAL DAILY
Status: DISCONTINUED | OUTPATIENT
Start: 2018-07-17 | End: 2018-07-19 | Stop reason: HOSPADM

## 2018-07-16 RX ORDER — HEPARIN SODIUM 5000 [USP'U]/ML
5000 INJECTION, SOLUTION INTRAVENOUS; SUBCUTANEOUS EVERY 8 HOURS SCHEDULED
Status: DISCONTINUED | OUTPATIENT
Start: 2018-07-16 | End: 2018-07-19 | Stop reason: HOSPADM

## 2018-07-16 RX ORDER — ASPIRIN 81 MG/1
81 TABLET ORAL EVERY OTHER DAY
Status: DISCONTINUED | OUTPATIENT
Start: 2018-07-16 | End: 2018-07-19 | Stop reason: HOSPADM

## 2018-07-16 RX ORDER — MEMANTINE HYDROCHLORIDE 5 MG/1
5 TABLET ORAL DAILY
Status: DISCONTINUED | OUTPATIENT
Start: 2018-07-17 | End: 2018-07-17

## 2018-07-16 RX ORDER — RISPERIDONE 0.25 MG/1
0.25 TABLET, FILM COATED ORAL 2 TIMES DAILY
Status: DISCONTINUED | OUTPATIENT
Start: 2018-07-16 | End: 2018-07-19 | Stop reason: HOSPADM

## 2018-07-16 RX ORDER — SACCHAROMYCES BOULARDII 250 MG
250 CAPSULE ORAL 2 TIMES DAILY
Status: DISCONTINUED | OUTPATIENT
Start: 2018-07-16 | End: 2018-07-19 | Stop reason: HOSPADM

## 2018-07-16 RX ORDER — MEMANTINE HYDROCHLORIDE 5 MG/1
10 TABLET ORAL DAILY
Status: DISCONTINUED | OUTPATIENT
Start: 2018-07-17 | End: 2018-07-19 | Stop reason: HOSPADM

## 2018-07-16 RX ADMIN — ASPIRIN 81 MG: 81 TABLET, COATED ORAL at 21:21

## 2018-07-16 RX ADMIN — FUROSEMIDE 20 MG: 10 INJECTION, SOLUTION INTRAVENOUS at 18:14

## 2018-07-16 RX ADMIN — CEFTRIAXONE 1000 MG: 1 INJECTION, SOLUTION INTRAVENOUS at 18:18

## 2018-07-16 RX ADMIN — HEPARIN SODIUM 5000 UNITS: 5000 INJECTION, SOLUTION INTRAVENOUS; SUBCUTANEOUS at 21:21

## 2018-07-16 RX ADMIN — RDII 250 MG CAPSULE 250 MG: at 21:21

## 2018-07-16 RX ADMIN — RISPERIDONE 0.25 MG: 0.25 TABLET ORAL at 21:47

## 2018-07-16 NOTE — ED NOTES
10 minute notification and telebox [phone] provided to floor        Darling Langford RN  07/16/18 7086

## 2018-07-16 NOTE — H&P
[address] [facility]    H&P- Isa Gerber 2/16/1923, 80 y o  female MRN: [id number]    Unit/Bed#: [address] [place] [address] Encounter: [id number]    Primary Care Provider: Grant Bryant DO   Date and time admitted to hospital: 7/16/2018  4:45 PM        * SOB (shortness of breath)   Assessment & Plan    · Likely due to CHF, patient has no documented history but does have severe aortic stenosis with b/l lower extremity swelling  ·   · CXR: Pleural effusions  · ECHO in AM  · Cardiology consult  · Lasix 20 mg IV BID  · I/O, daily weights  · Telemetry monitoring  · BMP in AM        UTI (urinary tract infection)   Assessment & Plan    · UA:  Leukocytes  · Urine microscopy:  WBC, bacteria  · Rocephin 1000 mg Q 24 H, day 1        Aortic stenosis, severe   Assessment & Plan    · Echo 61/91/8484: Systolic function was normal  Ejection fraction was estimated to be 65 %  · Aortic valve: There was mild to moderate stenosis  · Echo in a m  VTE Prophylaxis: Heparin  / sequential compression device   Code Status:  Level 3 DNI/DNR  POLST: There is no POLST form on file for this patient (pre-hospital)  Discussion with family:  Daughter    Anticipated Length of Stay:  Patient will be admitted on an Inpatient basis with an anticipated length of stay of  greater than 2 midnights  Justification for Hospital Stay:  Shortness of breath    Chief Complaint:   Leg swelling    History of Present Illness:    Isa Gerber is a 80 y o  female with a PMH for moderate aortic stenosis, CKD, HTN who presents with bilateral lower extremity edema x 3 weeks  History is obtained from charts and patient's daughter due to patient's current mental status  Patient has no previous history of CHF  Patient saw her PCP today due to significant lower extremity edema and shortness of breath along with pursed lip breathing  Patient came to ED where a a chest x-ray showed trace left pleural effusion  She received 20 mg IV Lasix in ED  Echocardiogram from 2014 shows mild-to-moderate aortic stenosis with preserved ejection fraction  Currently  Patient states that she feels confused but is unable to say whether she has shortness of breath  Review of Systems:    Review of Systems   Unable to perform ROS: Dementia       Past Medical and Surgical History:     Past Medical History:   Diagnosis Date    Aortic valve stenosis     [facility] )     last assessed 04/22/2014    CKD (chronic kidney disease)     hypertensive; last assessed 07/19/13    Closed compression fracture of lumbar vertebra ([facility] )     last assessed 07/19/13    Clostridium difficile colitis     last assessed 01/18/13    Costochondritis     Dementia     Depression     Diverticulitis of colon     Generalized osteoarthritis of hand     last assessed 06/18/13    GERD (gastroesophageal reflux disease)     Herpes zoster     last assessed 01/30/17    Hiatal hernia     Hypertension     last assessed 11/20/2013    Jaw disease     Neoplasm of uncertain behavior of skin     last assessed 09/03/2013    Overactive bladder     Urge incontinence of urine     last assessed 04/29/15    UTI (urinary tract infection)        Past Surgical History:   Procedure Laterality Date    BREAST LUMPECTOMY Left     BREAST SURGERY      CHOLECYSTECTOMY      HYSTERECTOMY         Meds/Allergies:    Prior to Admission medications    Medication Sig Start Date End Date Taking? Authorizing Provider   Ascorbic Acid (VITAMIN C) 250 MG CHEW Chew 1 tablet daily 11/29/17   Historical Provider, MD   aspirin (ECOTRIN LOW STRENGTH) 81 mg EC tablet Take 81 mg by mouth every other day    Historical Provider, MD   Calcium-Magnesium-Vitamin D (CALCIUM 500 PO) Take by mouth 5/13/15   Historical Provider, MD   citalopram (CeleXA) 10 mg tablet TAKE 1 TABLET BY MOUTH EVERY DAY 3/19/18   Catheryn Officer,    CRANBERRY PO Take 1 tablet by mouth daily      Historical Provider, MD   Docusate Sodium (COLACE CLEAR PO) Take by mouth 11/29/17   Historical Provider, MD   famotidine (PEPCID) 10 mg tablet Take 10 mg by mouth daily  Historical Provider, MD   ferrous gluconate (FERGON) 324 mg tablet TAKE 1 TABLET DAILY 4/23/18   Caleb Wilburn DO   Lactobacillus (ACIDOPHILUS/BIFIDUS PO) Take by mouth 4/30/14   Historical Provider, MD   letrozole [facility]) 2 5 mg tablet Take 1 tablet (2 5 mg total) by mouth daily 6/21/18   Kasey Sherman MD   memantine [facility]) 10 mg tablet Take 1 tablet (10 mg total) by mouth daily 4/10/18   Caleb Wilburn DO   Memantine HCl ER 14 MG CP24 Take 1 capsule by mouth daily 4/10/18   Historical Provider, MD   risperiDONE (RisperDAL) 0 25 mg tablet Take 1 tablet (0 25 mg total) by mouth 2 (two) times a day 5/2/18   NICOLETTE Miranda     I have reviewed home medications with patient family member  Allergies: Allergies   Allergen Reactions    Codeine     Percocet  [Oxycodone-Acetaminophen]      Other reaction(s): INTOL  Reaction Date: [id number];     Tramadol     Vicodin  [Hydrocodone-Acetaminophen]      Other reaction(s): INTOL  Reaction Date: [id number];     Wound Dressings Rash and Edema       Social History:     Marital Status:     Occupation:  Retired  Patient Pre-hospital Living Situation:  Family  Patient Pre-hospital Level of Mobility:  Full  Patient Pre-hospital Diet Restrictions:  None  Substance Use History:   History   Alcohol Use No     History   Smoking Status    Never Smoker   Smokeless Tobacco    Never Used     History   Drug Use No       Family History:    Family History   Problem Relation Age of Onset    Heart disease Mother     Cancer Father     Cancer Sister     Cancer Brother     Breast cancer Maternal Aunt     Rectal cancer Family        Physical Exam:     Vitals:   Blood Pressure: 156/70 (07/16/18 1908)  Pulse: 62 (07/16/18 1908)  Temperature: 97 7 °F (36 5 °C) (07/16/18 1908)  Temp Source: Oral (07/16/18 1908)  Respirations: 19 (07/16/18 1908)  Height: 5' (152 4 cm) (07/16/18 1642)  Weight - Scale: 48 1 kg (106 lb 0 7 oz) (07/16/18 1642)  SpO2: 94 % (07/16/18 1908)    Physical Exam   Constitutional: She is oriented to person, place, and time  Vital signs are normal  She appears well-developed and well-nourished  Non-toxic appearance  No distress  HENT:   Head: Normocephalic and atraumatic  Mouth/Throat: Oropharynx is clear and moist and mucous membranes are normal  Normal dentition  No oropharyngeal exudate  Eyes: Conjunctivae are normal  Pupils are equal, round, and reactive to light  Right eye exhibits no discharge  Left eye exhibits no discharge  No scleral icterus  Neck: No JVD present  No tracheal deviation and no erythema present  Cardiovascular: Normal rate, regular rhythm, normal heart sounds, intact distal pulses and normal pulses  Exam reveals no gallop and no friction rub  No murmur heard  Pulmonary/Chest: Effort normal and breath sounds normal  No accessory muscle usage or stridor  No respiratory distress  She has no decreased breath sounds  She has no wheezes  She has no rales  Abdominal: Soft  Bowel sounds are normal  She exhibits no distension and no mass  There is no tenderness  There is no rebound  Musculoskeletal: She exhibits edema (+1  bilateral lower extremity edema)  She exhibits no tenderness or deformity  Neurological: She is alert and oriented to person, place, and time  GCS eye subscore is 4  GCS verbal subscore is 5  GCS motor subscore is 6  Skin: Skin is warm and dry  No rash noted  She is not diaphoretic  No erythema  No pallor  Psychiatric: She has a normal mood and affect  Her behavior is normal    Nursing note and vitals reviewed  Additional Data:     Lab Results: I have personally reviewed pertinent reports          Results from last 7 days  Lab Units 07/16/18  1646   WBC Thousand/uL 6 60   HEMOGLOBIN g/dL 11 9   HEMATOCRIT % 35 1   PLATELETS Thousands/uL 205   NEUTROS PCT % 49   LYMPHS PCT % 29   MONOS PCT % 16*   EOS PCT % 5       Results from last 7 days  Lab Units 07/16/18  1646   SODIUM mmol/L 138   POTASSIUM mmol/L 4 5   CHLORIDE mmol/L 103   CO2 mmol/L 31   BUN mg/dL 36*   CREATININE mg/dL 1 29   CALCIUM mg/dL 8 7   TOTAL PROTEIN g/dL 6 9   BILIRUBIN TOTAL mg/dL 0 30   ALK PHOS U/L 90   ALT U/L 26   AST U/L 26   GLUCOSE RANDOM mg/dL 90       Results from last 7 days  Lab Units 07/16/18  1646   INR  1 06               Imaging: I have personally reviewed pertinent reports  and I have personally reviewed pertinent films in PACS    X-ray chest 2 views   Final Result by Gilford Heap, MD (07/16 1813)      Possible trace left pleural effusion  Workstation performed: [id number]             EKG, Pathology, and Other Studies Reviewed on Admission:   · EKG: NSR with 1st degree AV block  Vent  rate 60 BPM  HI interval 218 ms  QRS duration 84 ms  QT/QTc 428/428 ms  P-R-T axes 59 -61 15  /65 mmHg    Allscripts / Epic Records Reviewed: Yes     ** Please Note: This note has been constructed using a voice recognition system   **

## 2018-07-16 NOTE — ASSESSMENT & PLAN NOTE
· Echo 23/47/0470: Systolic function was normal  Ejection fraction was estimated to be 65 %  · Aortic valve: There was mild to moderate stenosis  · Echo in a m

## 2018-07-16 NOTE — PROGRESS NOTES
Assessment/Plan:    No problem-specific Assessment & Plan notes found for this encounter  Diagnoses and all orders for this visit:    Acute pulmonary edema ([address] )  I suspect that the patient may be having a CHF exacerbation  She does not have a diagnosis of CHF in her problem list but does have aortic stenosis  She has bibasilar crackles and significant lower extremity swelling as well as symptomatic shortness of breath and pursed lip breathing  I advised her daughter that it is best to take her to the emergency room for immediate evaluation  Her daughter is taking her to [address] now  I did call and give him report  Subjective:   Chief Complaint   Patient presents with    Follow-up     pt here for f/u on uti that started 7/4/18, pt finished abx a couple days ago, pt has a labored breathing after taking a walk, pt also had swelling in both lower leg for the last month        Patient ID: Rosina Harrell is a 80 y o  female      The pt is here with her dtr and granddtr today   Her daughter says that over the past 5-6 weeks she has noticed a significant decline in her mom tanisha in strength and stamina   This started about a week into June, a little over a month ago  She was at [facility] ([facility] - she was there 18 days, starting 6/20  Came home 7/7  They were going to try and get her up walking more but they were not really doing it as they did not feel the patient was able to do so  Upon her return things seem to be worse than even before going into the nursing home  She is not dressing herself anymore  Can't lift herself from the toilet even with the handles  Now getting out of breath with walking at all  Legs are swollen and they did not used to be swollen like that  She is eating well, good appetite  The dementia seems worse as well  She had a UTI  Was on abx that she has completed  She is extremely tired  No fevers or chills  No nausea or vomiting        The following portions of the patient's history were reviewed and updated as appropriate: allergies, current medications, past family history, past medical history, past social history, past surgical history and problem list     Review of Systems      Objective:  Vitals:    07/16/18 1522   BP: 110/60   Pulse: 61   Temp: 98 5 °F (36 9 °C)   SpO2: 96%      Physical Exam   Constitutional: No distress  HENT:   Head: Normocephalic and atraumatic  Eyes: Conjunctivae are normal    Pulmonary/Chest: She is in respiratory distress (after her pulm exam she had pursed lip breathing)  She has no wheezes  She has rales (bilateral bases)  Musculoskeletal: She exhibits edema (+3 spongy pitting edema bilat calves)  Neurological: No cranial nerve deficit  Unable to tell if she is oriented, she did not really answer any questions, did not say much   Skin: Skin is warm and dry  She is not diaphoretic  There is erythema (and hyperpigmentation over the anterior calves)     Psychiatric:   Depressed, blunt, did not speak at all the whole visit

## 2018-07-16 NOTE — ASSESSMENT & PLAN NOTE
· Likely due to CHF, patient has no documented history but does have severe aortic stenosis with b/l lower extremity swelling  ·   · CXR: Pleural effusions  · ECHO in AM  · Cardiology consult  · Lasix 20 mg IV BID  · I/O, daily weights  · Telemetry monitoring  · BMP in AM

## 2018-07-16 NOTE — ED PROVIDER NOTES
History  Chief Complaint   Patient presents with    Leg Swelling     Patient presents from ED for b/l lower extremetity swelling and increase weakness, over the past couple of days  Patient was seen at PCP today for UTI and was sent to ED for r/o chf     Patient brought in by daughter with worsening confusion, weakness over last month and then short of breath only moving from bed to wheelchair over last few days  Denies fever or cough  Patient unable to give any hx, daughter gives most of history  Patient has been on course of abx for UTI ended about 3 days ago  She is usually oriented according to daughter  She was at primary care office and they referred her here for chf with sob, rales, and new bilateral leg swelling that has lessened since she has been sitting in the bed  Per daughter, they don't have resources to care for patient at home  She was in [facility] over 4th of July weekend  I reviewed records and patient has been already diagnosed with Alzheimer's dementia and palliative care was involved with her in the past   She had UTI 3-18 with corynebacterium thought to be contaminant  Prior to Admission Medications   Prescriptions Last Dose Informant Patient Reported? Taking? Ascorbic Acid (VITAMIN C) 250 MG CHEW   Yes No   Sig: Chew 1 tablet daily   CRANBERRY PO  Self Yes No   Sig: Take 1 tablet by mouth daily  Calcium-Magnesium-Vitamin D (CALCIUM 500 PO)   Yes No   Sig: Take by mouth   Docusate Sodium (COLACE CLEAR PO)   Yes No   Sig: Take by mouth   Lactobacillus (ACIDOPHILUS/BIFIDUS PO)   Yes No   Sig: Take by mouth   Memantine HCl ER 14 MG CP24   Yes No   Sig: Take 1 capsule by mouth daily   aspirin (ECOTRIN LOW STRENGTH) 81 mg EC tablet   Yes No   Sig: Take 81 mg by mouth every other day   citalopram (CeleXA) 10 mg tablet   No No   Sig: TAKE 1 TABLET BY MOUTH EVERY DAY   famotidine (PEPCID) 10 mg tablet  Self Yes No   Sig: Take 10 mg by mouth daily     ferrous gluconate (FERGON) 324 mg tablet   No No   Sig: TAKE 1 TABLET DAILY   letrozole (FEMARA) 2 5 mg tablet   No No   Sig: Take 1 tablet (2 5 mg total) by mouth daily   memantine (NAMENDA) 10 mg tablet   No No   Sig: Take 1 tablet (10 mg total) by mouth daily   risperiDONE (RisperDAL) 0 25 mg tablet   No No   Sig: Take 1 tablet (0 25 mg total) by mouth 2 (two) times a day      Facility-Administered Medications: None       Past Medical History:   Diagnosis Date    Aortic valve stenosis     [facility] cancer [facility] [address] )     last assessed 04/22/2014    CKD (chronic kidney disease)     hypertensive; last assessed 07/19/13    Closed compression fracture of lumbar vertebra ([facility] [address] )     last assessed 07/19/13    Clostridium difficile colitis     last assessed 01/18/13    Costochondritis     Dementia     Depression     Diverticulitis of colon     Generalized osteoarthritis of hand     last assessed 06/18/13    GERD (gastroesophageal reflux disease)     Herpes zoster     last assessed 01/30/17    Hiatal hernia     Hypertension     last assessed 11/20/2013    Jaw disease     Neoplasm of uncertain behavior of skin     last assessed 09/03/2013    Overactive bladder     Urge incontinence of urine     last assessed 04/29/15    UTI (urinary tract infection)        Past Surgical History:   Procedure Laterality Date    BREAST LUMPECTOMY Left     BREAST SURGERY      CHOLECYSTECTOMY      HYSTERECTOMY         Family History   Problem Relation Age of Onset    Heart disease Mother     Cancer Father     Cancer Sister     Cancer Brother     Breast cancer Maternal Aunt     Rectal cancer Family      I have reviewed and agree with the history as documented  Social History   Substance Use Topics    Smoking status: Never Smoker    Smokeless tobacco: Never Used    Alcohol use No        Review of Systems   Unable to perform ROS: Dementia       Physical Exam  Physical Exam   Constitutional: She appears well-developed and well-nourished  HENT:   Mouth/Throat: Oropharynx is clear and moist    Eyes: Conjunctivae and EOM are normal  Pupils are equal, round, and reactive to light  Neck: Normal range of motion  Neck supple  No spinous process tenderness present  Cardiovascular: Normal rate, regular rhythm, normal heart sounds and intact distal pulses  Pulmonary/Chest: Effort normal  No respiratory distress  She has no wheezes  She has rales  Abdominal: Soft  Bowel sounds are normal  She exhibits no distension  There is no tenderness  Musculoskeletal: Normal range of motion  She exhibits edema  +1 pitting edema bilateral lower extremities   Neurological: She is alert  She has normal strength  She is disoriented  No sensory deficit  GCS eye subscore is 4  GCS verbal subscore is 5  GCS motor subscore is 6  Generalized weakness   Skin: Skin is warm and dry  No rash noted  Psychiatric: Her affect is blunt  She is slowed  She is noncommunicative  Nursing note and vitals reviewed        Vital Signs  ED Triage Vitals [07/16/18 1642]   Temperature Pulse Respirations Blood Pressure SpO2   98 3 °F (36 8 °C) 62 19 146/65 97 %      Temp Source Heart Rate Source Patient Position - Orthostatic VS BP Location FiO2 (%)   Tympanic Monitor Lying Right arm --      Pain Score       No Pain           Vitals:    07/16/18 1800 07/16/18 1815 07/16/18 1830 07/16/18 1908   BP: 112/55 113/57 120/56 156/70   Pulse: 60 62 58 62   Patient Position - Orthostatic VS: Sitting  Sitting Lying       Visual Acuity      ED Medications  Medications   furosemide (LASIX) injection 20 mg (20 mg Intravenous Given 7/16/18 1814)   cefTRIAXone (ROCEPHIN) IVPB (premix) 1,000 mg (1,000 mg Intravenous New Bag 7/16/18 1818)       Diagnostic Studies  Results Reviewed     Procedure Component Value Units Date/Time    Urine Microscopic [[id number]]  (Abnormal) Collected:  07/16/18 1716    Lab Status:  Final result Specimen:  Urine from Urine, Clean Catch Updated:  07/16/18 1740     RBC, UA 4-10 (A) /hpf      WBC, UA 10-20 (A) /hpf      Epithelial Cells Occasional /hpf      Bacteria, UA Moderate (A) /hpf      AMORPH PHOSPATES Moderate /hpf     Urine culture [[id number]] Collected:  07/16/18 1716    Lab Status:   In process Specimen:  Urine from Urine, Clean Catch Updated:  07/16/18 1739    UA w Reflex to Microscopic w Reflex to Culture [[id number]]  (Abnormal) Collected:  07/16/18 1716    Lab Status:  Final result Specimen:  Urine from Urine, Clean Catch Updated:  07/16/18 1731     Color, UA Yellow     Clarity, UA Cloudy     Specific [place], UA 1 010     pH, UA 7 0     Leukocytes, UA Small (A)     Nitrite, UA Negative     Protein, UA Negative mg/dl      Glucose, UA Negative mg/dl      Ketones, UA Negative mg/dl      Urobilinogen, UA 0 2 E U /dl      Bilirubin, UA Negative     Blood, UA Moderate (A)    B-type natriuretic peptide [[id number]]  (Abnormal) Collected:  07/16/18 1646    Lab Status:  Final result Specimen:  Blood from Arm, Right Updated:  07/16/18 1722     NT-proBNP 634 (H) pg/mL     Troponin I [[id number]]  (Normal) Collected:  07/16/18 1646    Lab Status:  Final result Specimen:  Blood from Arm, Right Updated:  07/16/18 1717     Troponin I <0 02 ng/mL     Comprehensive metabolic panel [[id number]]  (Abnormal) Collected:  07/16/18 1646    Lab Status:  Final result Specimen:  Blood from Arm, Right Updated:  07/16/18 1715     Sodium 138 mmol/L      Potassium 4 5 mmol/L      Chloride 103 mmol/L      CO2 31 mmol/L      Anion Gap 4 mmol/L      BUN 36 (H) mg/dL      Creatinine 1 29 mg/dL      Glucose 90 mg/dL      Calcium 8 7 mg/dL      AST 26 U/L      ALT 26 U/L      Alkaline Phosphatase 90 U/L      Total Protein 6 9 g/dL      Albumin 3 4 (L) g/dL      Total Bilirubin 0 30 mg/dL      eGFR 35 ml/min/1 73sq m     Narrative:         National Kidney Disease Education Program recommendations are as follows:  GFR calculation is accurate only with a steady state creatinine  Chronic Kidney disease less than 60 ml/min/1 73 sq  meters  Kidney failure less than 15 ml/min/1 73 sq  meters  POCT urinalysis dipstick [[id number]]  (Normal) Resulted:  07/16/18 1714    Lab Status:  Final result Updated:  07/16/18 1715     Color, UA yellow     Clarity, UA hazy     EXT Glucose, UA (Ref: Negative) negative     EXT Bilirubin, UA (Ref: Negative) negative     EXT Ketones, UA (Ref: Negative) negative     EXT Spec Grav, UA 1 010     EXT Blood, UA (Ref: Negative) small     EXT pH, UA 7 0     EXT Protein, UA (Ref: Negative) negative     EXT Urobilinogen, UA (Ref: 0 2- 1 0) 0 2     EXT Leukocytes, UA (Ref: Negative) small     EXT Nitrite, UA (Ref: Negative) negative    APTT [[id number]]  (Normal) Collected:  07/16/18 1646    Lab Status:  Final result Specimen:  Blood from Arm, Right Updated:  07/16/18 1710     PTT 32 seconds     Protime-INR [[id number]]  (Normal) Collected:  07/16/18 1646    Lab Status:  Final result Specimen:  Blood from Arm, Right Updated:  07/16/18 1710     Protime 13 2 seconds      INR 1 06    CBC and differential [[id number]]  (Abnormal) Collected:  07/16/18 1646    Lab Status:  Final result Specimen:  Blood from Arm, Right Updated:  07/16/18 1658     WBC 6 60 Thousand/uL      RBC 3 66 (L) Million/uL      Hemoglobin 11 9 g/dL      Hematocrit 35 1 %      MCV 96 fL      MCH 32 5 pg      MCHC 33 9 g/dL      RDW 13 5 %      MPV 11 1 fL      Platelets 413 Thousands/uL      Neutrophils Relative 49 %      Immat GRANS % 0 %      Lymphocytes Relative 29 %      Monocytes Relative 16 (H) %      Eosinophils Relative 5 %      Basophils Relative 1 %      Neutrophils Absolute 3 31 Thousands/µL      Immature Grans Absolute 0 02 Thousand/uL      Lymphocytes Absolute 1 90 Thousands/µL      Monocytes Absolute 1 02 Thousand/µL      Eosinophils Absolute 0 30 Thousand/µL      Basophils Absolute 0 05 Thousands/µL                  X-ray chest 2 views   Final Result by Gilford Heap, MD (07/16 1753)      Possible trace left pleural effusion  Workstation performed: [id number]                    Procedures  ECG 12 Lead Documentation  Date/Time: 7/16/2018 5:10 PM  Performed by: Tung Fuller by: Iva See     Indications / Diagnosis:  Weakness  ECG reviewed by me, the ED Provider: yes    Patient location:  ED  Previous ECG:     Previous ECG:  Compared to current    Comparison ECG info:  2017    Similarity:  No change  Interpretation:     Interpretation: normal    Rate:     ECG rate:  67    ECG rate assessment: normal    Rhythm:     Rhythm: sinus rhythm    Ectopy:     Ectopy: none    QRS:     QRS axis:  Normal    QRS intervals:  Normal  Conduction:     Conduction: abnormal      Abnormal conduction: 1st degree and LAFB    ST segments:     ST segments:  Normal  T waves:     T waves: normal               Phone Contacts  ED Phone Contact    ED Course                               MDM  Number of Diagnoses or Management Options  Encephalopathy acute: new and requires workup  Pleural effusion: new and requires workup  UTI (urinary tract infection): new and requires workup     Amount and/or Complexity of Data Reviewed  Clinical lab tests: ordered and reviewed  Tests in the radiology section of CPT®: ordered and reviewed  Obtain history from someone other than the patient: yes  Discuss the patient with other providers: yes    Patient Progress  Patient progress: improved    CritCare Time    Disposition  Final diagnoses:   UTI (urinary tract infection)   Encephalopathy acute   Pleural effusion     Time reflects when diagnosis was documented in both MDM as applicable and the Disposition within this note     Time User Action Codes Description Comment    7/16/2018  6:11 PM Tauna Friendly Add [N39 0] UTI (urinary tract infection)     7/16/2018  6:11 PM Tauna Friendly Add [G93 40] Encephalopathy acute     7/16/2018  6:12 PM Tauna Friendly Add [J90] Pleural effusion       ED Disposition     ED Disposition Condition Comment    Admit    Admit to [facility] Follow-up Information    None         Current Discharge Medication List      CONTINUE these medications which have NOT CHANGED    Details   Ascorbic Acid (VITAMIN C) 250 MG CHEW Chew 1 tablet daily      aspirin (ECOTRIN LOW STRENGTH) 81 mg EC tablet Take 81 mg by mouth every other day      Calcium-Magnesium-Vitamin D (CALCIUM 500 PO) Take by mouth      citalopram (CeleXA) 10 mg tablet TAKE 1 TABLET BY MOUTH EVERY DAY  Qty: 30 tablet, Refills: 5    Associated Diagnoses: Recurrent major depressive disorder, in partial remission (HCC)      CRANBERRY PO Take 1 tablet by mouth daily  Docusate Sodium (COLACE CLEAR PO) Take by mouth      famotidine (PEPCID) 10 mg tablet Take 10 mg by mouth daily  ferrous gluconate (FERGON) 324 mg tablet TAKE 1 TABLET DAILY  Qty: 30 tablet, Refills: 5    Associated Diagnoses: Iron deficiency anemia, unspecified iron deficiency anemia type      Lactobacillus (ACIDOPHILUS/BIFIDUS PO) Take by mouth      letrozole (FEMARA) 2 5 mg tablet Take 1 tablet (2 5 mg total) by mouth daily  Qty: 30 tablet, Refills: 2    Associated Diagnoses: Malignant neoplasm of left breast in female, estrogen receptor positive, unspecified site of breast (HCC)      memantine (NAMENDA) 10 mg tablet Take 1 tablet (10 mg total) by mouth daily  Qty: 30 tablet, Refills: 5    Associated Diagnoses: SDAT (senile dementia of Alzheimer's type)      Memantine HCl ER 14 MG CP24 Take 1 capsule by mouth daily  Refills: 5      risperiDONE (RisperDAL) 0 25 mg tablet Take 1 tablet (0 25 mg total) by mouth 2 (two) times a day  Qty: 60 tablet, Refills: 2    Associated Diagnoses: Late onset Alzheimer's disease with behavioral disturbance           No discharge procedures on file      ED Provider  Electronically Signed by           Yuan Pitts DO  07/16/18 1936

## 2018-07-17 PROBLEM — B36.9 FUNGAL DERMATITIS: Chronic | Status: ACTIVE | Noted: 2018-07-17

## 2018-07-17 PROBLEM — N18.30 CHRONIC KIDNEY DISEASE, STAGE 3 (HCC): Chronic | Status: ACTIVE | Noted: 2018-07-17

## 2018-07-17 PROBLEM — R60.0 PEDAL EDEMA: Status: ACTIVE | Noted: 2018-07-17

## 2018-07-17 LAB
ANION GAP SERPL CALCULATED.3IONS-SCNC: 5 MMOL/L (ref 4–13)
ATRIAL RATE: 60 BPM
ATRIAL RATE: 68 BPM
BACTERIA UR CULT: NORMAL
BASOPHILS # BLD AUTO: 0.04 THOUSANDS/ΜL (ref 0–0.1)
BASOPHILS NFR BLD AUTO: 1 % (ref 0–1)
BUN SERPL-MCNC: 36 MG/DL (ref 5–25)
CALCIUM SERPL-MCNC: 8.9 MG/DL (ref 8.3–10.1)
CHLORIDE SERPL-SCNC: 105 MMOL/L (ref 100–108)
CO2 SERPL-SCNC: 32 MMOL/L (ref 21–32)
CREAT SERPL-MCNC: 1.13 MG/DL (ref 0.6–1.3)
EOSINOPHIL # BLD AUTO: 0.29 THOUSAND/ΜL (ref 0–0.61)
EOSINOPHIL NFR BLD AUTO: 5 % (ref 0–6)
ERYTHROCYTE [DISTWIDTH] IN BLOOD BY AUTOMATED COUNT: 13 % (ref 11.6–15.1)
GFR SERPL CREATININE-BSD FRML MDRD: 41 ML/MIN/1.73SQ M
GLUCOSE SERPL-MCNC: 83 MG/DL (ref 65–140)
HCT VFR BLD AUTO: 34 % (ref 34.8–46.1)
HGB BLD-MCNC: 11.5 G/DL (ref 11.5–15.4)
IMM GRANULOCYTES # BLD AUTO: 0.02 THOUSAND/UL (ref 0–0.2)
IMM GRANULOCYTES NFR BLD AUTO: 0 % (ref 0–2)
LYMPHOCYTES # BLD AUTO: 2.11 THOUSANDS/ΜL (ref 0.6–4.47)
LYMPHOCYTES NFR BLD AUTO: 36 % (ref 14–44)
MAGNESIUM SERPL-MCNC: 2.2 MG/DL (ref 1.6–2.6)
MCH RBC QN AUTO: 32.6 PG (ref 26.8–34.3)
MCHC RBC AUTO-ENTMCNC: 33.8 G/DL (ref 31.4–37.4)
MCV RBC AUTO: 96 FL (ref 82–98)
MONOCYTES # BLD AUTO: 0.8 THOUSAND/ΜL (ref 0.17–1.22)
MONOCYTES NFR BLD AUTO: 14 % (ref 4–12)
NEUTROPHILS # BLD AUTO: 2.59 THOUSANDS/ΜL (ref 1.85–7.62)
NEUTS SEG NFR BLD AUTO: 44 % (ref 43–75)
NRBC BLD AUTO-RTO: 0 /100 WBCS
P AXIS: 59 DEGREES
P AXIS: 60 DEGREES
PLATELET # BLD AUTO: 179 THOUSANDS/UL (ref 149–390)
PMV BLD AUTO: 11.9 FL (ref 8.9–12.7)
POTASSIUM SERPL-SCNC: 4.2 MMOL/L (ref 3.5–5.3)
PR INTERVAL: 204 MS
PR INTERVAL: 218 MS
QRS AXIS: -61 DEGREES
QRS AXIS: -72 DEGREES
QRSD INTERVAL: 84 MS
QRSD INTERVAL: 88 MS
QT INTERVAL: 428 MS
QT INTERVAL: 428 MS
QTC INTERVAL: 428 MS
QTC INTERVAL: 455 MS
RBC # BLD AUTO: 3.53 MILLION/UL (ref 3.81–5.12)
SODIUM SERPL-SCNC: 142 MMOL/L (ref 136–145)
T WAVE AXIS: 15 DEGREES
T WAVE AXIS: 42 DEGREES
VENTRICULAR RATE: 60 BPM
VENTRICULAR RATE: 68 BPM
WBC # BLD AUTO: 5.85 THOUSAND/UL (ref 4.31–10.16)

## 2018-07-17 PROCEDURE — G8978 MOBILITY CURRENT STATUS: HCPCS

## 2018-07-17 PROCEDURE — 85025 COMPLETE CBC W/AUTO DIFF WBC: CPT | Performed by: PHYSICIAN ASSISTANT

## 2018-07-17 PROCEDURE — 93005 ELECTROCARDIOGRAM TRACING: CPT

## 2018-07-17 PROCEDURE — 93010 ELECTROCARDIOGRAM REPORT: CPT | Performed by: INTERNAL MEDICINE

## 2018-07-17 PROCEDURE — 97167 OT EVAL HIGH COMPLEX 60 MIN: CPT

## 2018-07-17 PROCEDURE — 99232 SBSQ HOSP IP/OBS MODERATE 35: CPT | Performed by: INTERNAL MEDICINE

## 2018-07-17 PROCEDURE — G8979 MOBILITY GOAL STATUS: HCPCS

## 2018-07-17 PROCEDURE — 83735 ASSAY OF MAGNESIUM: CPT | Performed by: PHYSICIAN ASSISTANT

## 2018-07-17 PROCEDURE — 97163 PT EVAL HIGH COMPLEX 45 MIN: CPT

## 2018-07-17 PROCEDURE — G8987 SELF CARE CURRENT STATUS: HCPCS

## 2018-07-17 PROCEDURE — G8988 SELF CARE GOAL STATUS: HCPCS

## 2018-07-17 PROCEDURE — 80048 BASIC METABOLIC PNL TOTAL CA: CPT | Performed by: PHYSICIAN ASSISTANT

## 2018-07-17 PROCEDURE — 99222 1ST HOSP IP/OBS MODERATE 55: CPT | Performed by: INTERNAL MEDICINE

## 2018-07-17 RX ORDER — FUROSEMIDE 20 MG/1
20 TABLET ORAL DAILY
Status: DISCONTINUED | OUTPATIENT
Start: 2018-07-18 | End: 2018-07-19 | Stop reason: HOSPADM

## 2018-07-17 RX ORDER — NYSTATIN 100000 [USP'U]/G
POWDER TOPICAL 2 TIMES DAILY
Status: DISCONTINUED | OUTPATIENT
Start: 2018-07-17 | End: 2018-07-19 | Stop reason: HOSPADM

## 2018-07-17 RX ADMIN — Medication 1 TABLET: at 09:13

## 2018-07-17 RX ADMIN — RDII 250 MG CAPSULE 250 MG: at 09:10

## 2018-07-17 RX ADMIN — RDII 250 MG CAPSULE 250 MG: at 17:32

## 2018-07-17 RX ADMIN — RISPERIDONE 0.25 MG: 0.25 TABLET ORAL at 09:11

## 2018-07-17 RX ADMIN — CEFTRIAXONE 1000 MG: 1 INJECTION, SOLUTION INTRAVENOUS at 17:56

## 2018-07-17 RX ADMIN — DOCUSATE SODIUM 100 MG: 100 CAPSULE, LIQUID FILLED ORAL at 09:09

## 2018-07-17 RX ADMIN — FAMOTIDINE 10 MG: 20 TABLET ORAL at 09:10

## 2018-07-17 RX ADMIN — NYSTATIN: 100000 POWDER TOPICAL at 12:25

## 2018-07-17 RX ADMIN — FERROUS GLUCONATE TAB 324 MG (37.5 MG ELEMENTAL IRON) 324 MG: 324 (37.5 FE) TAB at 09:11

## 2018-07-17 RX ADMIN — RISPERIDONE 0.25 MG: 0.25 TABLET ORAL at 17:33

## 2018-07-17 RX ADMIN — CITALOPRAM HYDROBROMIDE 10 MG: 10 TABLET ORAL at 09:10

## 2018-07-17 RX ADMIN — HEPARIN SODIUM 5000 UNITS: 5000 INJECTION, SOLUTION INTRAVENOUS; SUBCUTANEOUS at 05:23

## 2018-07-17 RX ADMIN — FUROSEMIDE 20 MG: 10 INJECTION, SOLUTION INTRAMUSCULAR; INTRAVENOUS at 09:14

## 2018-07-17 RX ADMIN — MEMANTINE HYDROCHLORIDE 5 MG: 5 TABLET ORAL at 09:14

## 2018-07-17 RX ADMIN — OXYCODONE HYDROCHLORIDE AND ACETAMINOPHEN 250 MG: 500 TABLET ORAL at 09:09

## 2018-07-17 RX ADMIN — MEMANTINE HYDROCHLORIDE 10 MG: 5 TABLET ORAL at 09:26

## 2018-07-17 RX ADMIN — HEPARIN SODIUM 5000 UNITS: 5000 INJECTION, SOLUTION INTRAVENOUS; SUBCUTANEOUS at 13:59

## 2018-07-17 RX ADMIN — HEPARIN SODIUM 5000 UNITS: 5000 INJECTION, SOLUTION INTRAVENOUS; SUBCUTANEOUS at 21:32

## 2018-07-17 RX ADMIN — LETROZOLE 2.5 MG: 2.5 TABLET ORAL at 09:11

## 2018-07-17 RX ADMIN — NYSTATIN: 100000 POWDER TOPICAL at 17:33

## 2018-07-17 NOTE — PLAN OF CARE
Problem: OCCUPATIONAL THERAPY ADULT  Goal: Performs self-care activities at highest level of function for planned discharge setting  See evaluation for individualized goals  Treatment Interventions: ADL retraining, Functional transfer training, Endurance training, Cognitive reorientation, Patient/family training, Neuromuscular reeducation          See flowsheet documentation for full assessment, interventions and recommendations  Limitation: Decreased ADL status, Decreased Safe judgement during ADL, Decreased endurance, Decreased self-care trans, Decreased high-level ADLs (decreased balance, rigidity)  Prognosis: Good (to achieve supervised living )  Assessment: Patient is a 79 y/o female with known history of Alzheimers Dementia admitted with c/o labored breathing with ambulation, increased b/l LE edema and worsening confusion  Patient diagnosed with CHF, refrerred to OT for functional assessment of ADL skills  Patient is alert however was not fully oriented this AM   Patient is a ? historian but reports living in an apartment attached to her daughter's home but unable to provide clear information regarding PLOF  Patient currently presents with decreased activity tolerance, decreased balance, mild muscle weakness and need for cogntive support to safely and effectively sequence through basic ADL tasks impacting occupational performance in areas of bathing, dressing, toileting, ADL transfers and functional mobility  From OT standpoint, recommend discharge to in-patient rehab prior to returning home with family support  Will continue to follow for acute care OT services to progress basic ADL skills to highest level of independnece and safety with least amount of caregiver support  Recommendation: PT consult  OT Discharge Recommendation: Short Term Rehab  OT - OK to Discharge:  Yes

## 2018-07-17 NOTE — PHYSICAL THERAPY NOTE
Physical Therapy Evaluation    Patient Name: Sushila Cantor    [id number] Date: 7/17/2018     Problem List  Patient Active Problem List   Diagnosis    Aortic stenosis, severe    Basal cell carcinoma of skin of nose    Basal cell carcinoma of scalp and skin of neck    Chronic low back pain    Constipation    Frailty    Dementia    Depression    Dyslipidemia    Eczema    Falls    Gait disturbance    Gastroesophageal reflux disease    Iron deficiency anemia secondary to inadequate dietary iron intake    Malignant neoplasm of breast (HCC)    Muscular deconditioning    Overactive bladder    UTI (urinary tract infection)    SOB (shortness of breath)    Fungal dermatitis    Chronic kidney disease, stage 3    Pedal edema        Past Medical History  Past Medical History:   Diagnosis Date    Aortic valve stenosis     Breast cancer ([facility] )     last assessed 04/22/2014    CKD (chronic kidney disease)     hypertensive; last assessed 07/19/13    Closed compression fracture of lumbar vertebra ([facility] )     last assessed 07/19/13    Clostridium difficile colitis     last assessed 01/18/13    Costochondritis     Dementia     Depression     Diverticulitis of colon     Generalized osteoarthritis of hand     last assessed 06/18/13    GERD (gastroesophageal reflux disease)     Herpes zoster     last assessed 01/30/17    Hiatal hernia     Hypertension     last assessed 11/20/2013    Jaw disease     Neoplasm of uncertain behavior of skin     last assessed 09/03/2013    Overactive bladder     Urge incontinence of urine     last assessed 04/29/15    UTI (urinary tract infection)         Past Surgical History  Past Surgical History:   Procedure Laterality Date    BREAST LUMPECTOMY Left     BREAST SURGERY      CHOLECYSTECTOMY      HYSTERECTOMY           07/17/18 0911   Note Type   Note type Eval only   Pain Assessment   Pain Assessment 0-10   Pain Score No Pain   Prior Function   Lives With Family;Daughter   Receives Help From Family   ADL Assistance Independent   IADLs Needs assistance  (+ MOWs, + family A )   Falls in the last 6 months 1 to 4   Comments pt lives in an attached apt to her daughter's home  daughter works as a hairdresser during the day and pt reports she is often alone  PTA pt reports being MI w/ use of RW and I w/ toileting/ dressing at baseline  - pt is a somewhat unreliable historian and CM may need to clairify accuracy of info w/ daughter and family   Restrictions/Precautions   Weight Bearing Precautions Per Order No   General   Family/Caregiver Present No   Cognition   Overall Cognitive Status Impaired   Attention Difficulty dividing attention   Orientation Level Oriented to person  (hx of Alzheimers Dementia )   Memory Decreased recall of biographical information;Decreased short term memory   Following Commands Follows one step commands with increased time or repetition   RUE Assessment   RUE Assessment WFL  (very rigid in trunk and UEs )   LUE Assessment   LUE Assessment WFL   RLE Assessment   RLE Assessment WFL  (4/5 throughout )   LLE Assessment   LLE Assessment WFL   Coordination   Movements are Fluid and Coordinated 0   Coordination and Movement Description bradykinetic; rigid and w/ lack of fluidity; Bed Mobility   Supine to Sit 4  Minimal assistance   Additional items Assist x 1;LE management; Increased time required   Transfers   Sit to Stand 3  Moderate assistance   Additional items Assist x 1; Increased time required;Verbal cues   Stand to Sit 3  Moderate assistance   Additional items Assist x 1; Increased time required;Verbal cues   Stand pivot 3  Moderate assistance   Additional items Assist x 1  (using RW)   Ambulation/Elevation   Gait pattern Step to;Short stride; Foward flexed;Decreased foot clearance;Narrow ALKA   Gait Assistance 3  Moderate assist   Additional items Assist x 1  (chair follow for safety)   Assistive Device Rolling walker   Distance 5' w/ RW and modA; forward dlexion and requiring max cues and assit for rw negotiation  modA for LOB posterior   Balance   Static Sitting Fair   Dynamic Sitting Fair -   Static Standing Poor +   Dynamic Standing Poor  (rw)   Endurance Deficit   Endurance Deficit Yes   Activity Tolerance   Activity Tolerance Patient limited by fatigue  (Activity Tolerance: Fair-)   Medical Staff Made Aware yes- OT- Noris   Nurse Made Aware RN clears pt for treatment    Assessment   Prognosis Fair   Problem List Decreased strength;Decreased range of motion;Decreased endurance; Impaired balance;Decreased mobility; Decreased coordination;Decreased cognition; Impaired judgement;Decreased safety awareness; Impaired hearing; Impaired sensation;Pain;Decreased skin integrity   Assessment Pt is 80 y o  female seen for PT evaluation s/p admit to [facility] on 7/16/2018  Pt presenting from PCP office w/ increased LE edema; SOB and weakness  CXR + for : pleural effusion  Current dx/ problem list includes: pleural effusion; UTI: SOB; acute encephalopathy; severe AS; fungal dermatitis;   PT now consulted for assessment of mobility and d/c needs  PMhx (as above)  is significant for moderate aortic stenosis, CKD, HTN who presents with bilateral lower extremity edema x 3 weeks and  personal factors currently affecting pt's physical performance include: alone at times; ROSARIO?; advanced age; fall risk/ ?falls history; impaired safety and cognition; use of AD at baseline  Prior to admission, pt reports that she was living in attached apt to her daughter's home w/ ? ROSARIO and was using a RW household distances MI and performing toile ting dressing and bathing MI  At present pt is a somewhat unreliable historian and info should be confirmed w/ family for accuracy   Upon evaluation, pt currently is requiring Codi A for bed skills; modA for functional transfers and modA for ambulation w/ RW 5' w/ significant balance impairment and fatigue limiting distnaces  Pt presents functioning below baseline and currently w/ overall mobility deficits 2* to: decreased LE strength/AROM; limited flexibility;  generalized weakness/ deconditioning; decreased endurance; decreased activity tolerance; decreased coordination; impaired balance; gait deviations; decreased safety awareness; SOB/CEBALLOS; fatigue; impaired safety and judgement; limited insight into current deficits; bed/ chair alarms; multiple lines; hearing impairment/ visual impairments;     Pt currently at risk for falls  (Please find additional objective findings from PT assessment regarding body systems outlined above ) Pt will continue to benefit from skilled PT interventions to address stated impairments; to maximize functional potential; for ongoing pt/ family training; and DME needs  PT is currently recommending d/c to inpatient rehab setting when medically cleared for safety and to maximize functional potential prior to d/c home- home alone is not recommended as a safe d/c option- see OT eval for further cognitive input  Will follow     Barriers to Discharge Inaccessible home environment;Decreased caregiver support   Goals   Patient Goals "I want to go home   STG Expiration Date 07/27/18   Short Term Goal #1 In 10 days pt will complete: 1) Bed mobility skills with S which will  Enable her to return to previous living environment 2) Functional transfers with SI 3) Ambulation with RW 50' x2 w/o LOB at S level which will enable her to d/c home w/ daughter to a S environment ' without LOB and stable vitals  4) Stair training up/ down 3 step/s with 1 rail/s  and Codi for access to home environment   5) Improve balance grades to Good 6) Improve LE strength grades by 1   7) LE HEP independently  8) PT for ongoing pt and family education; DME needs and D/C planning to promote highest level of function in least restrictive environment      Treatment Day 0   Plan Treatment/Interventions Functional transfer training;LE strengthening/ROM; Elevations; Therapeutic exercise; Endurance training;Cognitive reorientation;Patient/family training;Equipment eval/education; Bed mobility;Gait training;Spoke to nursing;Spoke to case management;OT   PT Frequency (3-5x/wk )   Recommendation   Recommendation Short-term skilled PT   Equipment Recommended Walker; Wheelchair   PT - OK to Discharge (to rehab when medically cleared )   Modified Colin Scale   Modified Whiting Scale 4   Barthel Index   Feeding 5   Bathing 0   Grooming Score 5   Dressing Score 0   Bladder Score 10   Bowels Score 10   Toilet Use Score 5   Transfers (Bed/Chair) Score 5   Mobility (Level Surface) Score 0   Stairs Score 0   Barthel Index Score 40     Yuly Barrera, PT

## 2018-07-17 NOTE — OCCUPATIONAL THERAPY NOTE
Occupational Therapy Evaluation      [employer]    7/17/2018    Patient Active Problem List   Diagnosis    Aortic stenosis, severe    Basal cell carcinoma of skin of nose    Basal cell carcinoma of scalp and skin of neck    Chronic low back pain    Constipation    Frailty    Dementia    Depression    Dyslipidemia    Eczema    Falls    Gait disturbance    Gastroesophageal reflux disease    Iron deficiency anemia secondary to inadequate dietary iron intake    Malignant neoplasm of breast (HCC)    Muscular deconditioning    Overactive bladder    UTI (urinary tract infection)    SOB (shortness of breath)    Fungal dermatitis    Chronic kidney disease, stage 3    Pedal edema       Past Medical History:   Diagnosis Date    Aortic valve stenosis     [facility] )     last assessed 04/22/2014    CKD (chronic kidney disease)     hypertensive; last assessed 07/19/13    Closed compression fracture of lumbar vertebra ([facility] )     last assessed 07/19/13    Clostridium difficile colitis     last assessed 01/18/13    Costochondritis     Dementia     Depression     Diverticulitis of colon     Generalized osteoarthritis of hand     last assessed 06/18/13    GERD (gastroesophageal reflux disease)     Herpes zoster     last assessed 01/30/17    Hiatal hernia     Hypertension     last assessed 11/20/2013    Jaw disease     Neoplasm of uncertain behavior of skin     last assessed 09/03/2013    Overactive bladder     Urge incontinence of urine     last assessed 04/29/15    UTI (urinary tract infection)        Past Surgical History:   Procedure Laterality Date    BREAST LUMPECTOMY Left     BREAST SURGERY      CHOLECYSTECTOMY      HYSTERECTOMY        07/17/18 0855   Note Type   Note type Eval only   Restrictions/Precautions   Weight Bearing Precautions Per Order No   Other Precautions Cognitive; Chair Alarm; Bed Alarm; Fall Risk;Multiple lines   Pain Assessment   Pain Assessment No/denies pain   Pain Score No Pain   Home Living   Type of Home Apartment  (Attached to daughter's home )   Home Layout One level   Bathroom Shower/Tub Tub/shower unit   [address] [place] chair   [address] Walker;Cane   Prior Function   Lives With Family;Daughter   Receives Help From Family   ADL Assistance Independent   IADLs Needs assistance  (+ MOWs, + family A )   Falls in the last 6 months 1 to 4   Psychosocial   Psychosocial (WDL) WDL   Subjective   Subjective " I don't even know where I am this morning"    ADL   Eating Assistance 5  Supervision/Setup   Grooming Assistance 5  Supervision/Setup   UB Bathing Assistance 3  Moderate Assistance   LB Bathing Assistance 2  Maximal Assistance   UB Dressing Assistance 2  Maximal Assistance   LB Dressing Assistance 2  Maximal [address]  1  Total Assistance   Bed Mobility   Supine to Sit 4  Minimal assistance   Additional items Assist x 1; Increased time required;LE management   Transfers   Sit to Stand 3  Moderate assistance   Additional items Assist x 1   Stand to Sit 3  Moderate assistance   Additional items Assist x 1   Stand pivot 3  Moderate assistance   Additional items Assist x 1   Balance   Static Sitting Fair   Dynamic Sitting Fair -   Static Standing Fair -   Dynamic Standing Poor +   Activity Tolerance   Activity Tolerance Patient limited by fatigue  (Activity Tolerance: Fair-)   Medical Staff Made Aware OT spoke with Liang Luciano RN and Anders Kent PT    RUE Assessment   RUE Assessment WFL  (very rigid in trunk and UEs )   LUE Assessment   LUE Assessment WFL   Hand Function   Gross Motor Coordination (delayed )   Fine Motor Coordination (delayed)   Sensation   Light Touch No apparent deficits   Vision-Basic Assessment   Current Vision Wears glasses all the time   Cognition   Overall Cognitive Status Impaired   Arousal/Participation Alert; Responsive   Attention Difficulty dividing attention   Orientation Level Oriented to person  (hx of Alzheimers Dementia )   Memory Decreased recall of biographical information;Decreased short term memory   Following Commands Follows one step commands with increased time or repetition   Cognition Assessment Tools (Global Deterioration Scale )   Score (Level 4 - Moderate Cogntiive Decline )   Assessment   Limitation Decreased ADL status; Decreased Safe judgement during ADL;Decreased endurance;Decreased self-care trans;Decreased high-level ADLs  (decreased balance, rigidity)   Prognosis Good  (to achieve supervised living )   Assessment Patient is a 81 y/o female with known history of Alzheimers Dementia admitted with c/o labored breathing with ambulation, increased b/l LE edema and worsening confusion  Patient diagnosed with CHF, refrerred to OT for functional assessment of ADL skills  Patient is alert however was not fully oriented this AM   Patient is a ? historian but reports living in an apartment attached to her daughter's home but unable to provide clear information regarding PLOF  Patient currently presents with decreased activity tolerance, decreased balance, mild muscle weakness and need for cogntive support to safely and effectively sequence through basic ADL tasks impacting occupational performance in areas of bathing, dressing, toileting, ADL transfers and functional mobility  From OT standpoint, recommend discharge to in-patient rehab prior to returning home with family support  Will continue to follow for acute care OT services to progress basic ADL skills to highest level of independnece and safety with least amount of caregiver support  Goals   Patient Goals "I want to go home"   LTG Time Frame 3-7   Long Term Goal #1 1  Patient will increase bed mobility to Mod I level; 2  Increase UB bathing and dressing to SBA level; 3  Increase LB ADLs to SBA level 4  Increase all aspects of toileting to SBA level ; 5    Increase ADL transfers to SBA level with less than 25% cogntiive support for safe tech    Plan   Treatment Interventions ADL retraining;Functional transfer training; Endurance training;Cognitive reorientation;Patient/family training;Neuromuscular reeducation   Goal Expiration Date 07/24/18   OT Frequency 2-3x/wk   Recommendation   Recommendation PT consult   OT Discharge Recommendation Short Term Rehab   OT - OK to Discharge Yes   Barthel Index   Feeding 5   Bathing 0   Grooming Score 5   Dressing Score 0   Bladder Score 10   Bowels Score 10   Toilet Use Score 5   Transfers (Bed/Chair) Score 5   Mobility (Level Surface) Score 0   Stairs Score 0   Barthel Index Score 40   Modified Rincon Scale   Modified Rincon Scale 4   Emanuel Allred, OT

## 2018-07-17 NOTE — CASE MANAGEMENT
Initial Clinical Review  Admission: Date/Time/Statement: 7/16/18 @ 1813   Orders Placed This Encounter   Procedures    Inpatient Admission (expected length of stay for this patient is greater than two midnights)     Standing Status:   Standing     Number of Occurrences:   1     Order Specific Question:   Admitting Physician     Answer:   Yehuda Mi [[id number]]     Order Specific Question:   Level of Care     Answer:   Med Surg [16]     Order Specific Question:   Estimated length of stay     Answer:   More than 2 Midnights     Order Specific Question:   Certification     Answer:   I certify that inpatient services are medically necessary for this patient for a duration of greater than two midnights  See H&P and MD Progress Notes for additional information about the patient's course of treatment  ED: Date/Time/Mode of Arrival:   ED Arrival Information     Expected Arrival Acuity Means of Arrival Escorted By Service Admission Type    - 7/16/2018 16:31 Emergent Wheelchair Family Member General Medicine Emergency    Arrival Complaint    SOB/WEAKNESS      Chief Complaint:   Chief Complaint   Patient presents with    Leg Swelling     Patient presents from ED for b/l lower extremetity swelling and increase weakness, over the past couple of days  Patient was seen at PCP today for UTI and was sent to ED for r/o chf   History of Illness:   Patient brought in by daughter with worsening confusion, weakness over last month and then short of breath only moving from bed to wheelchair over last few days  Denies fever or cough  Patient unable to give any hx, daughter gives most of history  Patient has been on course of abx for UTI ended about 3 days ago  She is usually oriented according to daughter    She was at primary care office and they referred her here for chf with sob, rales, and new bilateral leg swelling  ED Vital Signs:   ED Triage Vitals [07/16/18 1642]   Temperature Pulse Respirations Blood Pressure SpO2   98 3 °F (36 8 °C) 62 19 146/65 97 %      Temp Source Heart Rate Source Patient Position - Orthostatic VS BP Location FiO2 (%)   Tympanic Monitor Lying Right arm --      Pain Score       No Pain        Wt Readings from Last 1 Encounters:   07/17/18 49 kg (108 lb 0 4 oz)   Vital Signs (abnormal): Abnormal Labs/Diagnostic Test Results:   BUN 36 GFR 35    U/A+LEUK, BLOOD, BACTERIA  CXR=Possible trace left pleural effusion  ED Treatment:   Medication Administration from 07/16/2018 1631 to 07/16/2018 1906       Date/Time Order Dose Route Action Action by Comments     07/16/2018 1814 furosemide (LASIX) injection 20 mg 20 mg Intravenous Given David Wright RN      07/16/2018 1818 cefTRIAXone (ROCEPHIN) IVPB (premix) 1,000 mg 1,000 mg Intravenous New Bag David Wright RN       Past Medical/Surgical History:    Active Ambulatory Problems     Diagnosis Date Noted    Aortic stenosis, severe 07/15/2013    Basal cell carcinoma of skin of nose 09/10/2013    Basal cell carcinoma of scalp and skin of neck 09/10/2013    Chronic low back pain 05/18/2017    Constipation 04/01/2015    Frailty 10/24/2017    Dementia 04/01/2015    Depression 06/19/2012    Dyslipidemia 03/22/2016    Eczema 10/19/2017    Falls 07/03/2017    Gait disturbance 06/19/2012    Gastroesophageal reflux disease 09/14/2012    Iron deficiency anemia secondary to inadequate dietary iron intake 08/26/2015    Malignant neoplasm of breast ([facility] ) 05/21/2014    Muscular deconditioning 11/03/2016    Overactive bladder 09/14/2012     Resolved Ambulatory Problems     Diagnosis Date Noted    [facility] 12/29/2017     Past Medical History:   Diagnosis Date    Aortic valve stenosis     Breast cancer (HCC)     CKD (chronic kidney disease)     Closed compression fracture of lumbar vertebra (HCC)     Clostridium difficile colitis     Costochondritis     Dementia     Depression     Diverticulitis of colon     Generalized osteoarthritis of hand     GERD (gastroesophageal reflux disease)     Herpes zoster     Hiatal hernia     Hypertension     Jaw disease     Neoplasm of uncertain behavior of skin     Overactive bladder     Urge incontinence of urine     UTI (urinary tract infection)    Admitting Diagnosis: UTI (urinary tract infection) [N39 0]  SOB (shortness of breath) [R06 02]  Pleural effusion [J90]  Encephalopathy acute [G93 40]  Age/Sex: 80 y o  female  Assessment/Plan:   SOB (shortness of breath)   Assessment & Plan     · Likely due to CHF, patient has no documented history but does have severe aortic stenosis with b/l lower extremity swelling  ·   · CXR: Pleural effusions  · ECHO in AM  · Cardiology consult  · Lasix 20 mg IV BID  · I/O, daily weights  · Telemetry monitoring  · BMP in AM   Admission Orders:  TELEMETRY  PT/OT EVAL & TX  CONSULT CARDIO  CHF EDUCATION  VENODYNES  Scheduled Meds:   Current Facility-Administered Medications:  ascorbic acid 250 mg Oral Daily   aspirin 81 mg Oral Every Other Day   calcium carbonate 1 tablet Oral Daily With Breakfast   calcium carbonate 1,000 mg Oral Daily PRN   cefTRIAXone 1,000 mg Intravenous Q24H   citalopram 10 mg Oral Daily   docusate sodium 100 mg Oral Daily   famotidine 10 mg Oral Daily   ferrous gluconate 324 mg Oral Daily   furosemide 0 mg Intravenous BID   heparin (porcine) 5,000 Units Subcutaneous Q8H [facility]   letrozole 2 5 mg Oral Daily   memantine 10 mg Oral Daily   nystatin  Topical BID   ondansetron 4 mg Intravenous Q6H PRN   risperiDONE 0 25 mg Oral BID   saccharomyces boulardii 250 mg Oral BID   Continuous Infusions:    PRN Meds: calcium carbonate    ondansetron

## 2018-07-17 NOTE — PROGRESS NOTES
Progress Note - Dino Llamas 80 y o  female MRN: [id number]    Unit/Bed#: [address] [place] [address] 208-01 Encounter: [id number]      Assessment:  Principal Problem:    UTI (urinary tract infection)  Active Problems:    Muscular deconditioning    SOB (shortness of breath)    Aortic stenosis, severe    Chronic kidney disease, stage 3    Fungal dermatitis    Dementia    Gait disturbance    Pedal edema  Resolved Problems:    * No resolved hospital problems  *        Plan:  · UTI-await urine and blood cultures-day 2  Of empiric antibiotic therapy  · Shortness of breath with pedal edema- some suspicion of CHF symptoms with edema present on admission will recheck echo as there is concerned she may have critical aortic stenosis contributing-now on low-dose Lasix  · Fungal dermatitis under the breasts-will add nystatin powder  · Dementia-had some increased confusion-metabolic encephalopathy on top of her usual confusion yesterday but appears to be somewhat improved    Subjective:   Patient is out of bed and chair being watched by staff  She denies shortness of breath or cough  She denies any abdominal pain  She seems confused on how she got here to the hospital but is pleasant and cooperative  ROS  Comprehensive system review negative other than noted above    Objective:     Vitals: Blood pressure 149/65, pulse 61, temperature (!) 96 8 °F (36 °C), temperature source Tympanic, resp  rate 16, height 5' (1 524 m), weight 49 kg (108 lb 0 4 oz), SpO2 96 %, not currently breastfeeding  ,Body mass index is 21 1 kg/m²    Current Facility-Administered Medications   Medication Dose Route Frequency Provider Last Rate Last Dose    ascorbic acid (VITAMIN C) tablet 250 mg  250 mg Oral Daily Fred Flores PA-C   250 mg at 07/17/18 0047    aspirin (ECOTRIN LOW STRENGTH) EC tablet 81 mg  81 mg Oral Every Other Day Fred Flores PA-C   81 mg at 07/16/18 2121    calcium carbonate (OYSTER SHELL,OSCAL) 500 mg tablet 1 tablet  1 tablet Oral Daily With Breakfast ALMAZ JohnsonC   1 tablet at 07/17/18 0913    calcium carbonate (TUMS) chewable tablet 1,000 mg  1,000 mg Oral Daily PRN ALYCE Johnson-C        cefTRIAXone (ROCEPHIN) IVPB (premix) 1,000 mg  1,000 mg Intravenous Q24H Devon Galloway PA-C        citalopram (CeleXA) tablet 10 mg  10 mg Oral Daily Devon Galloway, PA-C   10 mg at 07/17/18 0690    docusate sodium (COLACE) capsule 100 mg  100 mg Oral Daily Devon Galloway, PA-C   100 mg at 07/17/18 5418    famotidine (PEPCID) tablet 10 mg  10 mg Oral Daily Devon Galloway, PA-C   10 mg at 07/17/18 4836    ferrous gluconate (FERGON) tablet 324 mg  324 mg Oral Daily Devon Galloway, PA-C   324 mg at 07/17/18 5918    furosemide (LASIX) injection 20 mg  20 mg Intravenous BID ALYCE Johnson-C   20 mg at 07/17/18 9649    heparin (porcine) subcutaneous injection 5,000 Units  5,000 Units Subcutaneous [facility] ALYCE Johnson-C   5,000 Units at 07/17/18 4701    letrozole [facility]) tablet 2 5 mg  2 5 mg Oral Daily Devon Galloway, PA-C   2 5 mg at 07/17/18 0911    memantine (NAMENDA) tablet 10 mg  10 mg Oral Daily Devon Galloway, PA-C   10 mg at 07/17/18 8078    memantine (NAMENDA) tablet 5 mg  5 mg Oral Daily Devon Galloway, PA-C   5 mg at 07/17/18 9685    ondansetron (ZOFRAN) injection 4 mg  4 mg Intravenous Q6H PRN ALYCE Johnson-C        risperiDONE (RisperDAL) tablet 0 25 mg  0 25 mg Oral BID Devon Galloway, PA-C   0 25 mg at 07/17/18 8265    saccharomyces boulardii (FLORASTOR) capsule 250 mg  250 mg Oral BID Devon Galloway, PA-C   250 mg at 07/17/18 1963     Prescriptions Prior to Admission   Medication    Ascorbic Acid (VITAMIN C) 250 MG CHEW    aspirin (ECOTRIN LOW STRENGTH) 81 mg EC tablet    Calcium-Magnesium-Vitamin D (CALCIUM 500 PO)    citalopram (CeleXA) 10 mg tablet    CRANBERRY PO    Docusate Sodium (COLACE CLEAR PO)    famotidine (PEPCID) 10 mg tablet    ferrous gluconate (FERGON) 324 mg tablet    Lactobacillus (ACIDOPHILUS/BIFIDUS PO)    letrozole (FEMARA) 2 5 mg tablet    memantine (NAMENDA) 10 mg tablet    Memantine HCl ER 14 MG CP24    risperiDONE (RisperDAL) 0 25 mg tablet         Intake/Output Summary (Last 24 hours) at 07/17/18 1105  Last data filed at 07/17/18 0314   Gross per 24 hour   Intake                0 ml   Output             1408 ml   Net            -1408 ml       Physical Exam:  General appearance: distracted  Neck: no adenopathy, no JVD and thyroid not enlarged, symmetric, no tenderness/mass/nodules  Lungs: clear to auscultation bilaterally no rales  Heart: regularly irregular rhythm and Grade 3/6 systolic murmur in the aortic region  Abdomen: soft, non-tender; bowel sounds normal; no masses,  no organomegaly  Extremities: extremities normal, warm and well-perfused; no cyanosis, clubbing, or edema  Skin: Some raised red rash under her breasts  Neurologic:  No focal motor deficits  Some confusion to recent events       Lab, Imaging and other studies: I have personally reviewed pertinent reports              Results from last 7 days  Lab Units 07/17/18  0533 07/16/18  1646   WBC Thousand/uL 5 85 6 60   HEMOGLOBIN g/dL 11 5 11 9   HEMATOCRIT % 34 0* 35 1   PLATELETS Thousands/uL 179 205   NEUTROS PCT % 44 49   LYMPHS PCT % 36 29   MONOS PCT % 14* 16*   EOS PCT % 5 5       Results from last 7 days  Lab Units 07/17/18  0533 07/16/18  1646   SODIUM mmol/L 142 138   POTASSIUM mmol/L 4 2 4 5   CHLORIDE mmol/L 105 103   CO2 mmol/L 32 31   BUN mg/dL 36* 36*   CREATININE mg/dL 1 13 1 29   CALCIUM mg/dL 8 9 8 7   TOTAL PROTEIN g/dL  --  6 9   BILIRUBIN TOTAL mg/dL  --  0 30   ALK PHOS U/L  --  90   ALT U/L  --  26   AST U/L  --  26   GLUCOSE RANDOM mg/dL 83 90     Lab Results   Component Value Date    TROPONINI <0 02 07/16/2018    TROPONINI <0 02 06/20/2017    TROPONINI <0 02 05/12/2017       Results from last 7 days  Lab Units 07/16/18  1646   INR  1 06     Lab Results   Component Value Date    URINECX 60,000-69,000 cfu/ml Mixed Contaminants X4 06/20/2017    URINECX  09/26/2016     >100,000 cfu/ml presumptive Corynebacterium ureolyticum       Imaging:  No results found for this or any previous visit  Results for orders placed during the hospital encounter of 07/16/18   X-ray chest 2 views    Narrative CHEST     INDICATION:   Chest pain  COMPARISON:  Chest x-ray from 6/20/2017    EXAM PERFORMED/VIEWS:  XR CHEST PA & LATERAL      FINDINGS:  Patient is somewhat rotated to the right  Cardiac silhouette is unremarkable  There is a large hiatal hernia  Right hemidiaphragm is mildly elevated  There may be a trace left pleural effusion  Right lung is clear  No pneumothorax  Osseous structures appear within normal limits for patient age  Impression Possible trace left pleural effusion  Workstation performed: [id number]         PATIENT CENTERED ROUNDS: I have performed rounds with the nursing staff            Misa Michele DO

## 2018-07-17 NOTE — CONSULTS
Consultation - Cardiology   Emmy Chang 80 y o  female MRN: [id number]  Unit/Bed#: [address] [place] [address] 208-01 Encounter: [id number]      Assessment:  Principal Problem:    UTI (urinary tract infection)  Active Problems: Aortic stenosis, severe    Dementia    Gait disturbance    Muscular deconditioning    SOB (shortness of breath)    Fungal dermatitis    Chronic kidney disease, stage 3    Pedal edema      Plan:    1  Acute diastolic CHF - likely secondary to underlying worsening aortic stenosis  Continue gentle diuresis  We will review her echocardiogram   Follow urine output, daily weights and labs  2   Aortic stenosis - this is likely more severe  It was mild-to-moderate about 5 years ago  Checking an updated echocardiogram   Regardless, medical management and treatment of CHF is is what will be ultimately recommended  She does not appear to be a candidate for any aortic valve replacement/TAVR  History of Present Illness   Physician Requesting Consult: Grace Kawasaki,   Reason for Consult / Principal Problem: CHF    HPI: Moses Delgadillo is a 80y o  year old female who presents with worsening shortness of breath and signs of volume overload  And does not follow with cardiologist, but does have a history of aortic stenosis  She had an echocardiogram about 5 years ago that showed mild-to-moderate aortic stenosis  Has not had a follow-up echo since then  Patient does not recall a lot of the events, as she does have what appears to be a moderate amount of dementia  She does recall just not feeling well, and feeling warm  She does remember feeling short of breath but she cannot provide much history beyond that  According to the chart, she went to her PCP who noted her shortness of breath, pursed lip breathing and lower extremity edema  She at that point was sent over to the emergency room for evaluation  She was started on IV Lasix and does feel better      Consults    Review of Systems:   Please refer the HPI for all cardiac and pulmonary review of systems  Somewhat limited secondary to her memory loss  However, most other 10 systems reviewed and are negative  Historical Information   Past Medical History:   Diagnosis Date    Aortic valve stenosis     Breast cancer ([facility] )     last assessed 04/22/2014    CKD (chronic kidney disease)     hypertensive; last assessed 07/19/13    Closed compression fracture of lumbar vertebra ([facility] )     last assessed 07/19/13    Clostridium difficile colitis     last assessed 01/18/13    Costochondritis     Dementia     Depression     Diverticulitis of colon     Generalized osteoarthritis of hand     last assessed 06/18/13    GERD (gastroesophageal reflux disease)     Herpes zoster     last assessed 01/30/17    Hiatal hernia     Hypertension     last assessed 11/20/2013    Jaw disease     Neoplasm of uncertain behavior of skin     last assessed 09/03/2013    Overactive bladder     Urge incontinence of urine     last assessed 04/29/15    UTI (urinary tract infection)      Past Surgical History:   Procedure Laterality Date    BREAST LUMPECTOMY Left     BREAST SURGERY      CHOLECYSTECTOMY      HYSTERECTOMY       History   Alcohol Use No     History   Drug Use No     History   Smoking Status    Never Smoker   Smokeless Tobacco    Never Used     Family History: non-contributory    Meds/Allergies   all current active meds have been reviewed  Allergies   Allergen Reactions    Codeine     Percocet  [Oxycodone-Acetaminophen]      Other reaction(s): INTOL  Reaction Date: [id number];     Tramadol     Vicodin  [Hydrocodone-Acetaminophen]      Other reaction(s):  INTOL  Reaction Date: [id number];     Wound Dressings Rash and Edema         Current Facility-Administered Medications:     ascorbic acid (VITAMIN C) tablet 250 mg, 250 mg, Oral, Daily, Brandy Sr PA-C, 250 mg at 07/17/18 1061    aspirin (ECOTRIN LOW STRENGTH) EC tablet 81 mg, 81 mg, Oral, Every Other Day, Areli Lagunas PA-C, 81 mg at 07/16/18 2121    calcium carbonate (OYSTER SHELL,OSCAL) 500 mg tablet 1 tablet, 1 tablet, Oral, Daily With Breakfast, Areli Lagunas PA-C, 1 tablet at 07/17/18 1154    calcium carbonate (TUMS) chewable tablet 1,000 mg, 1,000 mg, Oral, Daily PRN, Areli Lagunas PA-C    cefTRIAXone (ROCEPHIN) IVPB (premix) 1,000 mg, 1,000 mg, Intravenous, Q24H, Areli Lagunas PA-C    citalopram (CeleXA) tablet 10 mg, 10 mg, Oral, Daily, Areli Lagunas PA-C, 10 mg at 07/17/18 4551    docusate sodium (COLACE) capsule 100 mg, 100 mg, Oral, Daily, Areli Lagunas PA-C, 100 mg at 07/17/18 8356    famotidine (PEPCID) tablet 10 mg, 10 mg, Oral, Daily, Areli Lagunas PA-C, 10 mg at 07/17/18 7890    ferrous gluconate (FERGON) tablet 324 mg, 324 mg, Oral, Daily, Areli Lagunas PA-C, 324 mg at 07/17/18 0911    [START ON 7/18/2018] furosemide (LASIX) tablet 20 mg, 20 mg, Oral, Daily, Santa Fly, DO    heparin (porcine) subcutaneous injection 5,000 Units, 5,000 Units, Subcutaneous, Q8H [address], 5,000 Units at 07/17/18 1359 **AND** [CANCELED] Platelet count, , , Once, Areli Lagunas PA-C    letrozole [facility]) tablet 2 5 mg, 2 5 mg, Oral, Daily, Areli Lagunas PA-C, 2 5 mg at 07/17/18 0911    memantine (NAMENDA) tablet 10 mg, 10 mg, Oral, Daily, Areli Lagunas PA-C, 10 mg at 07/17/18 0967    nystatin (MYCOSTATIN) powder, , Topical, BID, Santa Fly, DO    ondansetron (ZOFRAN) injection 4 mg, 4 mg, Intravenous, Q6H PRN, Areli Lagunas PA-C    risperiDONE (RisperDAL) tablet 0 25 mg, 0 25 mg, Oral, BID, Areli Lagunas PA-C, 0 25 mg at 07/17/18 7965    saccharomyces boulardii (FLORASTOR) capsule 250 mg, 250 mg, Oral, BID, Areli Lagunas PA-C, 250 mg at 07/17/18 7448    Objective   Vitals: Blood pressure 149/65, pulse 61, temperature (!) 96 8 °F (36 °C), temperature source Tympanic, resp   rate 16, height 5' (1 524 m), weight 49 kg (108 lb 0 4 oz), SpO2 96 %, not currently breastfeeding , Body mass index is 21 1 kg/m² , Orthostatic Blood Pressures      Most Recent Value   Blood Pressure  149/65 filed at 07/17/2018 0740   Patient Position - Orthostatic VS  Lying filed at 07/17/2018 0740            Intake/Output Summary (Last 24 hours) at 07/17/18 1435  Last data filed at 07/17/18 0314   Gross per 24 hour   Intake                0 ml   Output             1408 ml   Net            -1408 ml         Physical Exam:  GEN: Isa Gerber appears well, alert and oriented x 3, pleasant and cooperative   HEENT: pupils equal, round, and reactive to light; extraocular muscles intact  NECK: supple, no carotid bruits  +JVD   HEART: regular rhythm, normal S1 and S2, systolic ejection murmur, no clicks, gallops or rubs   LUNGS:  Diminished bilaterally; no wheezes, rales, or rhonchi   ABDOMEN: normal bowel sounds, soft, no tenderness, no distention  EXTREMITIES: peripheral pulses normal; no clubbing, cyanosis    Mild lower extremity edema  NEURO: no focal findings   SKIN: normal without suspicious lesions on exposed skin    Lab Results:   Admission on 07/16/2018   Component Date Value    Sodium 07/16/2018 138     Potassium 07/16/2018 4 5     Chloride 07/16/2018 103     CO2 07/16/2018 31     Anion Gap 07/16/2018 4     BUN 07/16/2018 36*    Creatinine 07/16/2018 1 29     Glucose 07/16/2018 90     Calcium 07/16/2018 8 7     AST 07/16/2018 26     ALT 07/16/2018 26     Alkaline Phosphatase 07/16/2018 90     Total Protein 07/16/2018 6 9     Albumin 07/16/2018 3 4*    Total Bilirubin 07/16/2018 0 30     eGFR 07/16/2018 35     WBC 07/16/2018 6 60     RBC 07/16/2018 3 66*    Hemoglobin 07/16/2018 11 9     Hematocrit 07/16/2018 35 1     MCV 07/16/2018 96     MCH 07/16/2018 32 5     MCHC 07/16/2018 33 9     RDW 07/16/2018 13 5     MPV 07/16/2018 11 1     Platelets 77/25/5658 205     Neutrophils Relative 07/16/2018 49     Immat GRANS % 07/16/2018 0     Lymphocytes Relative 07/16/2018 29     Monocytes Relative 07/16/2018 16*    Eosinophils Relative 07/16/2018 5     Basophils Relative 07/16/2018 1     Neutrophils Absolute 07/16/2018 3 31     Immature Grans Absolute 07/16/2018 0 02     Lymphocytes Absolute 07/16/2018 1 90     Monocytes Absolute 07/16/2018 1 02     Eosinophils Absolute 07/16/2018 0 30     Basophils Absolute 07/16/2018 0 05     Troponin I 07/16/2018 <0 02     NT-proBNP 07/16/2018 634*    PTT 07/16/2018 32     Protime 07/16/2018 13 2     INR 07/16/2018 1 06     Color, UA 07/16/2018 yellow     Clarity, UA 07/16/2018 hazy     EXT Glucose, UA (Ref: Ne* 07/16/2018 negative     EXT Bilirubin, UA (Ref: * 07/16/2018 negative     EXT Ketones, UA (Ref: Ne* 07/16/2018 negative     EXT Spec Grav, UA 07/16/2018 1 010     EXT Blood, UA (Ref: Nega* 07/16/2018 small     EXT pH, UA 07/16/2018 7 0     EXT Protein, UA (Ref: Ne* 07/16/2018 negative     EXT Urobilinogen, UA (Re* 07/16/2018 0 2     EXT Leukocytes, UA (Ref:* 07/16/2018 small     EXT Nitrite, UA (Ref: Ne* 07/16/2018 negative     Color, UA 07/16/2018 Yellow     Clarity, UA 07/16/2018 Cloudy     Specific Gravity, UA 07/16/2018 1 010     pH, UA 07/16/2018 7 0     Leukocytes, UA 07/16/2018 Small*    Nitrite, UA 07/16/2018 Negative     Protein, UA 07/16/2018 Negative     Glucose, UA 07/16/2018 Negative     Ketones, UA 07/16/2018 Negative     Urobilinogen, UA 07/16/2018 0 2     Bilirubin, UA 07/16/2018 Negative     Blood, UA 07/16/2018 Moderate*    RBC, UA 07/16/2018 4-10*    WBC, UA 07/16/2018 10-20*    Epithelial Cells 07/16/2018 Occasional     Bacteria, UA 07/16/2018 Moderate*    AMORPH PHOSPATES 07/16/2018 Moderate     WBC 07/17/2018 5 85     RBC 07/17/2018 3 53*    Hemoglobin 07/17/2018 11 5     Hematocrit 07/17/2018 34 0*    MCV 07/17/2018 96     MCH 07/17/2018 32 6     MCHC 07/17/2018 33 8     RDW 07/17/2018 13 0     MPV 07/17/2018 11 9     Platelets 89/80/9658 179     nRBC 07/17/2018 0     Neutrophils Relative 07/17/2018 44     Immat GRANS % 07/17/2018 0     Lymphocytes Relative 07/17/2018 36     Monocytes Relative 07/17/2018 14*    Eosinophils Relative 07/17/2018 5     Basophils Relative 07/17/2018 1     Neutrophils Absolute 07/17/2018 2 59     Immature Grans Absolute 07/17/2018 0 02     Lymphocytes Absolute 07/17/2018 2 11     Monocytes Absolute 07/17/2018 0 80     Eosinophils Absolute 07/17/2018 0 29     Basophils Absolute 07/17/2018 0 04     Sodium 07/17/2018 142     Potassium 07/17/2018 4 2     Chloride 07/17/2018 105     CO2 07/17/2018 32     Anion Gap 07/17/2018 5     BUN 07/17/2018 36*    Creatinine 07/17/2018 1 13     Glucose 07/17/2018 83     Calcium 07/17/2018 8 9     eGFR 07/17/2018 41     Magnesium 07/17/2018 2 2      Labs & Results:      Results from last 7 days  Lab Units 07/16/18  1646   TROPONIN I ng/mL <0 02       Results from last 7 days  Lab Units 07/17/18  0533 07/16/18  1646   WBC Thousand/uL 5 85 6 60   HEMOGLOBIN g/dL 11 5 11 9   HEMATOCRIT % 34 0* 35 1   PLATELETS Thousands/uL 179 205           Results from last 7 days  Lab Units 07/17/18  0533 07/16/18  1646   SODIUM mmol/L 142 138   POTASSIUM mmol/L 4 2 4 5   CHLORIDE mmol/L 105 103   CO2 mmol/L 32 31   BUN mg/dL 36* 36*   CREATININE mg/dL 1 13 1 29   CALCIUM mg/dL 8 9 8 7   TOTAL PROTEIN g/dL  --  6 9   BILIRUBIN TOTAL mg/dL  --  0 30   ALK PHOS U/L  --  90   ALT U/L  --  26   AST U/L  --  26   GLUCOSE RANDOM mg/dL 83 90       Results from last 7 days  Lab Units 07/16/18  1646   INR  1 06   PTT seconds 32       Results from last 7 days  Lab Units 07/17/18  0533   MAGNESIUM mg/dL 2 2         Imaging: I have personally reviewed pertinent reports  X-ray Chest 2 Views    Result Date: 7/16/2018  Narrative: CHEST INDICATION:   Chest pain   COMPARISON:  Chest x-ray from 6/20/2017 EXAM PERFORMED/VIEWS:  XR CHEST PA & LATERAL FINDINGS: Patient is somewhat rotated to the right  Cardiac silhouette is unremarkable  There is a large hiatal hernia  Right hemidiaphragm is mildly elevated  There may be a trace left pleural effusion  Right lung is clear  No pneumothorax  Osseous structures appear within normal limits for patient age  Impression: Possible trace left pleural effusion  Workstation performed: [id number]       EKG:  Sinus rhythm with a left anterior fascicular block  No significant arrhythmias seen on telemetry review  Counseling / Coordination of Care  Total floor / unit time spent today 40 minutes  Greater than 50% of total time was spent with the patient and / or family counseling and / or coordination of care

## 2018-07-17 NOTE — SOCIAL WORK
Met with Pt's dtr(Pauline) per Pt's dtr's request  Pt's dtr informed  that Pt lives in apartment attached to her house  Pt uses walker  Pt has care givers few times a week for a few hours  Pt's dtr informed that Pt has had services through [facility] in past  Pt's dtr informed  that Pt was recently at [facility] for respite care  Pt's dtr aware of SNF recommendation  Pt's dtr requesting [facility]  Pt's dtr inquiring about assisted living after SNF if Pt cannot return home  Pt's dtr to consider options based on Pt's progress in SNF  Referral sent to [facility] per Pt's request via [facility] can accept Pt  Will follow

## 2018-07-18 ENCOUNTER — APPOINTMENT (INPATIENT)
Dept: RADIOLOGY | Facility: HOSPITAL | Age: 83
DRG: 291 | End: 2018-07-18
Payer: MEDICARE

## 2018-07-18 ENCOUNTER — APPOINTMENT (INPATIENT)
Dept: NON INVASIVE DIAGNOSTICS | Facility: HOSPITAL | Age: 83
DRG: 291 | End: 2018-07-18
Payer: MEDICARE

## 2018-07-18 PROBLEM — I50.32 CHRONIC DIASTOLIC CHF (CONGESTIVE HEART FAILURE) (HCC): Chronic | Status: ACTIVE | Noted: 2018-07-18

## 2018-07-18 LAB
ANION GAP SERPL CALCULATED.3IONS-SCNC: 4 MMOL/L (ref 4–13)
BUN SERPL-MCNC: 37 MG/DL (ref 5–25)
CALCIUM SERPL-MCNC: 8.4 MG/DL (ref 8.3–10.1)
CHLORIDE SERPL-SCNC: 104 MMOL/L (ref 100–108)
CO2 SERPL-SCNC: 31 MMOL/L (ref 21–32)
CREAT SERPL-MCNC: 1.16 MG/DL (ref 0.6–1.3)
ERYTHROCYTE [DISTWIDTH] IN BLOOD BY AUTOMATED COUNT: 12.9 % (ref 11.6–15.1)
GFR SERPL CREATININE-BSD FRML MDRD: 40 ML/MIN/1.73SQ M
GLUCOSE SERPL-MCNC: 83 MG/DL (ref 65–140)
HCT VFR BLD AUTO: 32 % (ref 34.8–46.1)
HGB BLD-MCNC: 10.8 G/DL (ref 11.5–15.4)
MCH RBC QN AUTO: 32 PG (ref 26.8–34.3)
MCHC RBC AUTO-ENTMCNC: 33.8 G/DL (ref 31.4–37.4)
MCV RBC AUTO: 95 FL (ref 82–98)
PLATELET # BLD AUTO: 181 THOUSANDS/UL (ref 149–390)
PMV BLD AUTO: 11.4 FL (ref 8.9–12.7)
POTASSIUM SERPL-SCNC: 4.1 MMOL/L (ref 3.5–5.3)
RBC # BLD AUTO: 3.37 MILLION/UL (ref 3.81–5.12)
SODIUM SERPL-SCNC: 139 MMOL/L (ref 136–145)
WBC # BLD AUTO: 5.7 THOUSAND/UL (ref 4.31–10.16)

## 2018-07-18 PROCEDURE — 93306 TTE W/DOPPLER COMPLETE: CPT | Performed by: INTERNAL MEDICINE

## 2018-07-18 PROCEDURE — 99232 SBSQ HOSP IP/OBS MODERATE 35: CPT | Performed by: INTERNAL MEDICINE

## 2018-07-18 PROCEDURE — 97530 THERAPEUTIC ACTIVITIES: CPT

## 2018-07-18 PROCEDURE — 80048 BASIC METABOLIC PNL TOTAL CA: CPT | Performed by: INTERNAL MEDICINE

## 2018-07-18 PROCEDURE — 93306 TTE W/DOPPLER COMPLETE: CPT

## 2018-07-18 PROCEDURE — 71046 X-RAY EXAM CHEST 2 VIEWS: CPT

## 2018-07-18 PROCEDURE — 97110 THERAPEUTIC EXERCISES: CPT

## 2018-07-18 PROCEDURE — 85027 COMPLETE CBC AUTOMATED: CPT | Performed by: INTERNAL MEDICINE

## 2018-07-18 RX ADMIN — Medication 1 TABLET: at 09:32

## 2018-07-18 RX ADMIN — HEPARIN SODIUM 5000 UNITS: 5000 INJECTION, SOLUTION INTRAVENOUS; SUBCUTANEOUS at 13:35

## 2018-07-18 RX ADMIN — NYSTATIN: 100000 POWDER TOPICAL at 09:37

## 2018-07-18 RX ADMIN — NYSTATIN: 100000 POWDER TOPICAL at 17:37

## 2018-07-18 RX ADMIN — CITALOPRAM HYDROBROMIDE 10 MG: 10 TABLET ORAL at 09:33

## 2018-07-18 RX ADMIN — RDII 250 MG CAPSULE 250 MG: at 17:37

## 2018-07-18 RX ADMIN — RISPERIDONE 0.25 MG: 0.25 TABLET ORAL at 17:37

## 2018-07-18 RX ADMIN — FAMOTIDINE 10 MG: 20 TABLET ORAL at 09:33

## 2018-07-18 RX ADMIN — ASPIRIN 81 MG: 81 TABLET, COATED ORAL at 09:50

## 2018-07-18 RX ADMIN — MEMANTINE HYDROCHLORIDE 10 MG: 5 TABLET ORAL at 09:35

## 2018-07-18 RX ADMIN — HEPARIN SODIUM 5000 UNITS: 5000 INJECTION, SOLUTION INTRAVENOUS; SUBCUTANEOUS at 22:12

## 2018-07-18 RX ADMIN — FERROUS GLUCONATE TAB 324 MG (37.5 MG ELEMENTAL IRON) 324 MG: 324 (37.5 FE) TAB at 09:35

## 2018-07-18 RX ADMIN — LETROZOLE 2.5 MG: 2.5 TABLET ORAL at 09:35

## 2018-07-18 RX ADMIN — FUROSEMIDE 20 MG: 20 TABLET ORAL at 09:46

## 2018-07-18 RX ADMIN — DOCUSATE SODIUM 100 MG: 100 CAPSULE, LIQUID FILLED ORAL at 09:32

## 2018-07-18 RX ADMIN — RDII 250 MG CAPSULE 250 MG: at 09:45

## 2018-07-18 RX ADMIN — OXYCODONE HYDROCHLORIDE AND ACETAMINOPHEN 250 MG: 500 TABLET ORAL at 09:34

## 2018-07-18 RX ADMIN — RISPERIDONE 0.25 MG: 0.25 TABLET ORAL at 09:35

## 2018-07-18 RX ADMIN — HEPARIN SODIUM 5000 UNITS: 5000 INJECTION, SOLUTION INTRAVENOUS; SUBCUTANEOUS at 05:34

## 2018-07-18 NOTE — SOCIAL WORK
Continue to follow  [facility] has bed available for Pt upon discharge  Call made to Pt's dtr(Pauline: [phone]), left message re: update for discharge planning  Anticipate return call  Will follow

## 2018-07-18 NOTE — PLAN OF CARE
Problem: PHYSICAL THERAPY ADULT  Goal: Performs mobility at highest level of function for planned discharge setting  See evaluation for individualized goals  Treatment/Interventions: Functional transfer training, LE strengthening/ROM, Elevations, Therapeutic exercise, Endurance training, Cognitive reorientation, Patient/family training, Equipment eval/education, Bed mobility, Gait training, Compensatory technique education, Spoke to nursing, OT, Spoke to case management  Equipment Recommended: Wheelchair, Combs Elie       See flowsheet documentation for full assessment, interventions and recommendations  Outcome: Progressing  Prognosis: Fair  Problem List: Decreased strength, Decreased range of motion, Decreased endurance, Impaired balance, Decreased mobility, Decreased coordination, Decreased cognition, Impaired judgement, Decreased safety awareness, Impaired hearing, Impaired vision  Assessment: Pt participated in skilled PT tx session w/ focus on standing balance; transfer training; bed skills and therex for increasing LE strengthening to assist w/ all phases of functional mobility  Pt was awake and alert; able to make needs known and pleasantly confused; but motivated to "get moving"  Pt performed sit< stands x4 reps from chair position w/ modA x1 for forward weight shift and force production  Pt has tendency to retropulse on stand and requires modA for correction  SPV xfer BTB using RW <3' forward then sidestepping to [facility] also required modA x1 for balance  furhter gait training not indicated 2* pt c/o overall fatigue and "legs to wobbly"  Sit>supine w/ modA for LE management   Pt seated w/ HOB elevated for comfort and all needs in reach  Pt tolerates therex w/o complaints and  Reports less stiffness following therex   Pt will continue to require skilled inpt rehab on d/c as she is alone at times throughout the day- consideration for increased A for mobility and safety on eventual d/c to home as pt remains a very high fall and readmission risk  Barriers to Discharge: Inaccessible home environment, Decreased caregiver support     Recommendation: Short-term skilled PT     PT - OK to Discharge:  (to rehab when medically cleared )    See flowsheet documentation for full assessment

## 2018-07-18 NOTE — PHYSICAL THERAPY NOTE
Physical Therapy Treatment     Patient Name: Lynda Vega    FDULR'F Date: 7/18/2018     Problem List  Patient Active Problem List   Diagnosis    Aortic stenosis, severe    Basal cell carcinoma of skin of nose    Basal cell carcinoma of scalp and skin of neck    Chronic low back pain    Constipation    Frailty    Dementia    Depression    Dyslipidemia    Eczema    Falls    Gait disturbance    Gastroesophageal reflux disease    Iron deficiency anemia secondary to inadequate dietary iron intake    Malignant neoplasm of breast (HCC)    Muscular deconditioning    Overactive bladder    SOB (shortness of breath)    Fungal dermatitis    Chronic kidney disease, stage 3    Pedal edema        Past Medical History  Past Medical History:   Diagnosis Date    Aortic valve stenosis     Breast cancer ([facility] )     last assessed 04/22/2014    CKD (chronic kidney disease)     hypertensive; last assessed 07/19/13    Closed compression fracture of lumbar vertebra ([facility] )     last assessed 07/19/13    Clostridium difficile colitis     last assessed 01/18/13    Costochondritis     Dementia     Depression     Diverticulitis of colon     Generalized osteoarthritis of hand     last assessed 06/18/13    GERD (gastroesophageal reflux disease)     Herpes zoster     last assessed 01/30/17    Hiatal hernia     Hypertension     last assessed 11/20/2013    Jaw disease     Neoplasm of uncertain behavior of skin     last assessed 09/03/2013    Overactive bladder     Urge incontinence of urine     last assessed 04/29/15    UTI (urinary tract infection)         Past Surgical History  Past Surgical History:   Procedure Laterality Date    BREAST LUMPECTOMY Left     BREAST SURGERY      CHOLECYSTECTOMY      HYSTERECTOMY             07/18/18 1356   Pain Assessment   Pain Assessment No/denies pain   Pain Score No Pain   Restrictions/Precautions   Weight Bearing Precautions Per Order No   Other Precautions Cognitive; Chair Alarm;Multiple lines; Fall Risk;Hard of hearing;Visual impairment   General   Chart Reviewed Yes   Cognition   Overall Cognitive Status Impaired   Arousal/Participation Alert; Cooperative;Responsive   Attention Within functional limits   Orientation Level Oriented to person;Oriented to place   Memory Decreased short term memory;Decreased recall of recent events   Following Commands Follows one step commands with increased time or repetition   Subjective   Subjective "I'm glad you are here"   Bed Mobility   Sit to Supine 3  Moderate assistance   Additional items Assist x 1; Increased time required;Verbal cues;LE management   Transfers   Sit to Stand 3  Moderate assistance   Additional items Assist x 1   Stand to Sit 3  Moderate assistance   Additional items Assist x 1; Increased time required;Verbal cues   Stand pivot 3  Moderate assistance   Additional items Assist x 1; Increased time required;Verbal cues   Ambulation/Elevation   Gait pattern Step to; Improper Weight shift;Decreased foot clearance;Shuffling; Foward flexed   Gait Assistance 3  Moderate assist   Additional items Assist x 1;Verbal cues; Tactile cues   Assistive Device Rolling walker   Distance 3'   Balance   Static Sitting Fair   Dynamic Sitting Fair -   Static Standing Poor +   Dynamic Standing Poor   Ambulatory Poor   Endurance Deficit   Endurance Deficit Yes   Activity Tolerance   Activity Tolerance Patient limited by fatigue   Nurse Made Aware SERGEY Craig updated    Exercises   Heelslides Supine;10 reps;Right;Left;AAROM;AROM  (x2)   Hip Abduction Supine;10 reps;Right;Left  (x2)   Knee AROM Long Arc Quad Sitting;10 reps;Right;Left  (x2)   Ankle Pumps Supine;10 reps;AROM; Right;Left  (x2)   Assessment   Prognosis Fair   Problem List Decreased strength;Decreased range of motion;Decreased endurance; Impaired balance;Decreased mobility; Decreased coordination;Decreased cognition; Impaired judgement;Decreased safety awareness; Impaired hearing; Impaired vision   Assessment Pt participated in skilled PT tx session w/ focus on standing balance; transfer training; bed skills and therex for increasing LE strengthening to assist w/ all phases of functional mobility  Pt was awake and alert; able to make needs known and pleasantly confused; but motivated to "get moving"  Pt performed sit< stands x4 reps from chair position w/ modA x1 for forward weight shift and force production  Pt has tendency to retropulse on stand and requires modA for correction  SPV xfer BTB using RW <3' forward then sidestepping to [facility] also required modA x1 for balance  furhter gait training not indicated 2* pt c/o overall fatigue and "legs to wobbly"  Sit>supine w/ modA for LE management   Pt seated w/ HOB elevated for comfort and all needs in reach  Pt tolerates therex w/o complaints and  Reports less stiffness following therex  Pt will continue to require skilled inpt rehab on d/c as she is alone at times throughout the day- consideration for increased A for mobility and safety on eventual d/c to home as pt remains a very high fall and readmission risk   Barriers to Discharge Inaccessible home environment;Decreased caregiver support   Goals   Patient Goals get stronger and go home    STG Expiration Date 07/27/18   Treatment Day 1   Plan   Treatment/Interventions Functional transfer training;LE strengthening/ROM; Elevations; Therapeutic exercise; Endurance training;Cognitive reorientation;Patient/family training;Equipment eval/education; Bed mobility;Gait training; Compensatory technique education;Spoke to nursing;OT;Spoke to case management   PT Frequency (3-5x/wk )   Recommendation   Recommendation Short-term skilled PT   Equipment Recommended Wheelchair;Walker     Esme Sanchez, PT

## 2018-07-18 NOTE — PROGRESS NOTES
Cardiology Progress Note - Carlos Gómez 80 y o  female MRN: [id number]    Unit/Bed#: [address] [place] [address] 208-01 Encounter: [id number]        Subjective:    No significant events overnight  No chest pain or dyspnea at rest      ROS    Objective:   Vitals: Blood pressure 109/51, pulse (!) 53, temperature (!) 97 4 °F (36 3 °C), temperature source Oral, resp  rate 17, height 5' (1 524 m), weight 50 kg (110 lb 3 7 oz), SpO2 95 %, not currently breastfeeding , Body mass index is 21 53 kg/m² , Orthostatic Blood Pressures      Most Recent Value   Blood Pressure  109/51 filed at 07/18/2018 0727   Patient Position - Orthostatic VS  Lying filed at 07/18/2018 9721         Systolic (31YIM), UAN:845 , Min:109 , RFM:518     Diastolic (55CIV), EDP:22, Min:51, Max:72      Intake/Output Summary (Last 24 hours) at 07/18/18 1015  Last data filed at 07/18/18 0358   Gross per 24 hour   Intake                0 ml   Output             1512 ml   Net            -1512 ml     Weight (last 2 days)     Date/Time   Weight    07/18/18 0727  50 (110 23)    07/17/18 0740  49 (108 03)    07/16/18 1642  48 1 (106 04)                    Physical Exam   Constitutional: She is oriented to person, place, and time  No distress  HENT:   Mouth/Throat: No oropharyngeal exudate  Eyes: No scleral icterus  Neck: No JVD present  Cardiovascular: Normal rate and regular rhythm  Murmur heard  Pulmonary/Chest: Effort normal and breath sounds normal  No respiratory distress  She has no wheezes  She has no rales  Abdominal: Soft  Bowel sounds are normal  She exhibits no distension  There is no tenderness  There is no rebound  Musculoskeletal: She exhibits no edema  Neurological: She is alert and oriented to person, place, and time  Skin: Skin is warm and dry  She is not diaphoretic  Psychiatric: She has a normal mood and affect   Her behavior is normal          Laboratory Results:    Results from last 7 days  Lab Units 07/16/18  1646   TROPONIN I ng/mL <0 02       CBC with diff:   Results from last 7 days  Lab Units 07/18/18  0528 07/17/18  0533 07/16/18  1646   WBC Thousand/uL 5 70 5 85 6 60   HEMOGLOBIN g/dL 10 8* 11 5 11 9   HEMATOCRIT % 32 0* 34 0* 35 1   MCV fL 95 96 96   PLATELETS Thousands/uL 181 179 205   MCH pg 32 0 32 6 32 5   MCHC g/dL 33 8 33 8 33 9   RDW % 12 9 13 0 13 5   MPV fL 11 4 11 9 11 1   NRBC AUTO /100 WBCs  --  0  --          CMP:  Results from last 7 days  Lab Units 07/18/18  0528 07/17/18  0533 07/16/18  1646   SODIUM mmol/L 139 142 138   POTASSIUM mmol/L 4 1 4 2 4 5   CHLORIDE mmol/L 104 105 103   CO2 mmol/L 31 32 31   ANION GAP mmol/L 4 5 4   BUN mg/dL 37* 36* 36*   CREATININE mg/dL 1 16 1 13 1 29   GLUCOSE RANDOM mg/dL 83 83 90   CALCIUM mg/dL 8 4 8 9 8 7   AST U/L  --   --  26   ALT U/L  --   --  26   ALK PHOS U/L  --   --  90   TOTAL PROTEIN g/dL  --   --  6 9   BILIRUBIN TOTAL mg/dL  --   --  0 30   EGFR ml/min/1 73sq m 40 41 35         BMP:  Results from last 7 days  Lab Units 07/18/18  0528 07/17/18  0533 07/16/18  1646   SODIUM mmol/L 139 142 138   POTASSIUM mmol/L 4 1 4 2 4 5   CHLORIDE mmol/L 104 105 103   CO2 mmol/L 31 32 31   BUN mg/dL 37* 36* 36*   CREATININE mg/dL 1 16 1 13 1 29   GLUCOSE RANDOM mg/dL 83 83 90   CALCIUM mg/dL 8 4 8 9 8 7       BNP: No results for input(s): BNP in the last 72 hours      Magnesium:   Results from last 7 days  Lab Units 07/17/18  0533   MAGNESIUM mg/dL 2 2       Coags:   Results from last 7 days  Lab Units 07/16/18  1646   PTT seconds 32   INR  1 06       TSH: No results found for: TSH    Hemoglobin A1C       Lipid Profile:       Cardiac testing:   Results for orders placed in visit on 05/13/14   Echo complete with contrast if indicated    Narrative [address]   [place]   [place], [address]   Phone: [phone]   TRANSTHORACIC ECHOCARDIOGRAM   2D, M-MODE, DOPPLER, AND COLOR DOPPLER   Study date:  13-May-2014   Patient: Pio Jackson   MR number: [id number]   Account number: [de-identified]   : [id number]   Age: 80 years   Gender: Female   Status: Outpatient   Location: Echo lab   Height: 58 in   Weight: 111 8 lb   BP: 120/ 59 mmHg   Indications: DIZZINESS  Diagnoses: 780 4 - DIZZINESS AND GIDDINESS   Sonographer:  Alverto Leggett CS AE-PE   Primary Physician:  Rishi Hector MD   Referring Physician:  Rishi Hector MD   Group:  [facility]   Interpreting Physician:  Consuelo Grimes MD   SUMMARY   LEFT VENTRICLE:   Systolic function was normal  Ejection fraction was estimated to be 65 %  There were no regional wall motion abnormalities  Wall thickness was at the upper limits of normal    Doppler parameters were consistent with abnormal left ventricular relaxation   (grade 1 diastolic dysfunction)  MITRAL VALVE:   There was mild to moderate annular calcification  There was mild regurgitation  AORTIC VALVE:   The valve was trileaflet  Leaflets exhibited moderate calcification and   moderately reduced cuspal separation  There was mild to moderate stenosis  There was mild regurgitation  Valve mean gradient was 17 mmHg  Aortic valve area was 1 5 cm squared by the continuity equation  TRICUSPID VALVE:   There was mild regurgitation  TRANSTHORACIC ECHOCARDIOGRAM   Patient: Jean Claude Lott   MR number: [id number]    ------ Page 2   Pulmonary artery systolic pressure was within the normal range  HISTORY: PRIOR HISTORY: Risk factors: hypertension  PROCEDURE: The study was performed in the echo lab  This was a routine study  The transthoracic approach was used  The study included complete 2D imaging,   M-mode, complete spectral Doppler, and color Doppler  Image quality was good  LEFT VENTRICLE: Size was normal  Systolic function was normal  Ejection   fraction was estimated to be 65 %  There were no regional wall motion   abnormalities   Wall thickness was at the upper limits of normal  No evidence    of   apical thrombus  DOPPLER: Doppler parameters were consistent with abnormal    left   ventricular relaxation (grade 1 diastolic dysfunction)  RIGHT VENTRICLE: The size was normal  Systolic function was normal  Wall   thickness was normal    LEFT ATRIUM: Size was normal    RIGHT ATRIUM: Size was normal    MITRAL VALVE: There was mild to moderate annular calcification  Valve    structure   was normal  There was normal leaflet separation  DOPPLER: The transmitral   velocity was within the normal range  There was no evidence for stenosis  There   was mild regurgitation  AORTIC VALVE: The valve was trileaflet  Leaflets exhibited moderate   calcification and moderately reduced cuspal separation  DOPPLER: Transaortic   velocity was increased due to valvular stenosis  There was mild to moderate   stenosis  There was mild regurgitation  TRICUSPID VALVE: The valve structure was normal  There was normal leaflet   separation  DOPPLER: The transtricuspid velocity was within the normal range  There was no evidence for stenosis  There was mild regurgitation  Pulmonary   artery systolic pressure was within the normal range  PULMONIC VALVE: Leaflets exhibited normal thickness, no calcification, and   normal cuspal separation  DOPPLER: The transpulmonic velocity was within the   normal range  There was no significant regurgitation  PERICARDIUM: There was no pericardial effusion  The pericardium was normal in   appearance  AORTA: The root exhibited normal size  SYSTEMIC VEINS: IVC: The inferior vena cava was normal in size  PULMONARY VEINS: DOPPLER: Doppler flow pattern was normal in the pulmonary   vein(s)     TRANSTHORACIC ECHOCARDIOGRAM   Patient: Mignon Ayala   MR number: [id number]    ------ Page 3   MEASUREMENT TABLES   DOPPLER MEASUREMENTS   Aortic valve   (Reference normals)   Mean gradient   17 mmHg   (--)   Valve area, cont   1 5 cm squared   (--)   SYSTEM MEASUREMENT TABLES   2D   %FS: 37 87 %   Ao Diam: 3 04 cm   EDV(Teich): 72 83 ml   EF(Teich): 68 52 %   ESV(Cube): 16 14 ml   ESV(Teich): 22 93 ml   IVSd: 1 24 cm   LA Area: 13 59 cm2   LA Diam: 2 91 cm   LVEDV MOD A4C: 51 31 ml   LVEF MOD A4C: 64 84 %   LVESV MOD A4C: 18 04 ml   LVIDd: 4 07 cm   LVIDs: 2 53 cm   LVLd A4C: 6 25 cm   LVLs A4C: 5 22 cm   LVOT Diam: 2 34 cm   LVPWd: 1 19 cm   RA Area: 11 82 cm2   RV Diam : 2 84 cm   SI(Cube): 36 02 ml/m2   SI(Teich): 35 14 ml/m2   SV MOD A4C: 33 27 ml   SV(Cube): 51 15 ml   SV(Teich): 49 9 ml   CW   AR Dec [place]: 2 05 m/s2   AR Dec Time: 2625 52 ms   AR PHT: 761 4 ms   AR Vmax: 4 39 m/s   AR maxP 19 mmHg   AV VTI: 72 22 cm   AV Vmax: 2 86 m/s   AV Vmean: 1 95 m/s   AV maxP 69 mmHg   AV meanP 22 mmHg   TR Vmax: 2 26 m/s   TR maxP 49 mmHg   TRANSTHORACIC ECHOCARDIOGRAM   Patient: Doyle Gaston   MR number: [id number]    ------ Page 4   MM   TAPSE: 2 34 cm   PW   DEAN (VTI): 1 63 cm2   DEAN Vmax: 1 5 cm2   E': 0 07 m/s   E/E': 9 03   LVOT VTI: 27 48 cm   LVOT Vmax: 1 m/s   LVOT Vmean: 0 63 m/s   LVOT maxP mmHg   LVOT meanP 9 mmHg   MV A [place]: 0 96 m/s   MV Dec [place]: 2 01 m/s2   MV DecT: 303 ms   MV E Fu[place]: 0 61 m/s   MV E/A Ratio: 0 63   [employer] Accredited Echocardiography Laboratory   Prepared and electronically signed by   Maral Hines MD   Signed 14-May-2014 16:09:19      No results found for this or any previous visit  No results found for this or any previous visit  No results found for this or any previous visit      Meds/Allergies   current meds:   Current Facility-Administered Medications   Medication Dose Route Frequency    ascorbic acid (VITAMIN C) tablet 250 mg  250 mg Oral Daily    aspirin (ECOTRIN LOW STRENGTH) EC tablet 81 mg  81 mg Oral Every Other Day    calcium carbonate (OYSTER SHELL,OSCAL) 500 mg tablet 1 tablet  1 tablet Oral Daily With Breakfast    calcium carbonate (TUMS) chewable tablet 1,000 mg  1,000 mg Oral Daily PRN    citalopram (CeleXA) tablet 10 mg  10 mg Oral Daily    docusate sodium (COLACE) capsule 100 mg  100 mg Oral Daily    famotidine (PEPCID) tablet 10 mg  10 mg Oral Daily    ferrous gluconate (FERGON) tablet 324 mg  324 mg Oral Daily    furosemide (LASIX) tablet 20 mg  20 mg Oral Daily    heparin (porcine) subcutaneous injection 5,000 Units  5,000 Units Subcutaneous Q8H [address]    memantine (NAMENDA) tablet 10 mg  10 mg Oral Daily    nystatin (MYCOSTATIN) powder   Topical BID    ondansetron (ZOFRAN) injection 4 mg  4 mg Intravenous Q6H PRN    risperiDONE (RisperDAL) tablet 0 25 mg  0 25 mg Oral BID    saccharomyces boulardii (FLORASTOR) capsule 250 mg  250 mg Oral BID     Prescriptions Prior to Admission   Medication    Ascorbic Acid (VITAMIN C) 250 MG CHEW    aspirin (ECOTRIN LOW STRENGTH) 81 mg EC tablet    Calcium-Magnesium-Vitamin D (CALCIUM 500 PO)    citalopram (CeleXA) 10 mg tablet    CRANBERRY PO    Docusate Sodium (COLACE CLEAR PO)    famotidine (PEPCID) 10 mg tablet    ferrous gluconate (FERGON) 324 mg tablet    Lactobacillus (ACIDOPHILUS/BIFIDUS PO)    letrozole (FEMARA) 2 5 mg tablet    memantine (NAMENDA) 10 mg tablet    Memantine HCl ER 14 MG CP24    risperiDONE (RisperDAL) 0 25 mg tablet          Assessment:  Principal Problem:    UTI (urinary tract infection)  Active Problems: Aortic stenosis, severe    Dementia    Gait disturbance    Muscular deconditioning    SOB (shortness of breath)    Fungal dermatitis    Chronic kidney disease, stage 3    Pedal edema    Plan:    Acute on Chronic Diastolic CHF/ AS: AS appears more in the moderate range by echo  Continue oral diuretics  Will sign off  Counseling / Coordination of Care  Total floor / unit time spent today 25 minutes  Greater than 50% of total time was spent with the patient and / or family counseling and / or coordination of care  A description of the counseling / coordination of care

## 2018-07-18 NOTE — PROGRESS NOTES
Progress Note - Torie Moser 80 y o  female MRN: [id number]    Unit/Bed#: [address] [place] [address] 208-01 Encounter: [id number]      Assessment:    Principal Problem:    Muscular deconditioning  Active Problems: Aortic stenosis, severe    Dementia    Gait disturbance    SOB (shortness of breath)    Fungal dermatitis    Chronic kidney disease, stage 3    Pedal edema  Resolved Problems:    * No resolved hospital problems  *      Plan:  Patient is having an echo today  She is for discharge tomorrow for short-term rehab  Of note her urine culture shows mixed growth therefore is not significant will discontinue antibiotics  Cardiology opinion appreciated  I discussed case with Oncology who felt that the letrozole that the patient has been on can be discontinued since his over 5 years  Subjective:   Pleasantly confused    Objective:     Vitals: Blood pressure 109/51, pulse (!) 53, temperature (!) 97 4 °F (36 3 °C), temperature source Oral, resp  rate 17, height 5' (1 524 m), weight 50 kg (110 lb 3 7 oz), SpO2 95 %, not currently breastfeeding  ,Body mass index is 21 53 kg/m²  Intake/Output Summary (Last 24 hours) at 07/18/18 1027  Last data filed at 07/18/18 0358   Gross per 24 hour   Intake                0 ml   Output             1512 ml   Net            -1512 ml       Physical Exam:    Alert and awake in no acute distress  Lungs clear to auscultation bilaterally  Heart regular rate and rythm, systolic murmur  Abdomen soft, active bowel sounds, non-tender, non-distended  Extremities: no cyanosis, clubbing or edema  Neuro: alert, no facial asymmetry,  speech fluent      Invasive Devices     Peripheral Intravenous Line            Peripheral IV 07/16/18 Right Forearm 1 day                            Lab, Imaging and other studies: I have personally reviewed pertinent reports         Results from last 7 days  Lab Units 07/18/18  0528 07/17/18  0533 07/16/18  1646   WBC Thousand/uL 5 70 5 85 6 60   HEMOGLOBIN g/dL 10 8* 11 5 11 9   HEMATOCRIT % 32 0* 34 0* 35 1   PLATELETS Thousands/uL 181 179 205   NEUTROS PCT %  --  44 49   LYMPHS PCT %  --  36 29   MONOS PCT %  --  14* 16*   EOS PCT %  --  5 5       Results from last 7 days  Lab Units 07/18/18  0528 07/17/18  0533 07/16/18  1646   SODIUM mmol/L 139 142 138   POTASSIUM mmol/L 4 1 4 2 4 5   CHLORIDE mmol/L 104 105 103   CO2 mmol/L 31 32 31   BUN mg/dL 37* 36* 36*   CREATININE mg/dL 1 16 1 13 1 29   CALCIUM mg/dL 8 4 8 9 8 7   TOTAL PROTEIN g/dL  --   --  6 9   BILIRUBIN TOTAL mg/dL  --   --  0 30   ALK PHOS U/L  --   --  90   ALT U/L  --   --  26   AST U/L  --   --  26   GLUCOSE RANDOM mg/dL 83 83 90     Lab Results   Component Value Date    TROPONINI <0 02 07/16/2018    TROPONINI <0 02 06/20/2017    TROPONINI <0 02 05/12/2017       Results from last 7 days  Lab Units 07/16/18  1646   INR  1 06     Lab Results   Component Value Date    URINECX >100,000 cfu/ml  07/16/2018    URINECX 60,000-69,000 cfu/ml Mixed Contaminants X4 06/20/2017    URINECX  09/26/2016     >100,000 cfu/ml presumptive Corynebacterium ureolyticum       Imaging:  No results found for this or any previous visit  Results for orders placed during the hospital encounter of 07/16/18   X-ray chest 2 views    Narrative CHEST     INDICATION:   Chest pain  COMPARISON:  Chest x-ray from 6/20/2017    EXAM PERFORMED/VIEWS:  XR CHEST PA & LATERAL      FINDINGS:  Patient is somewhat rotated to the right  Cardiac silhouette is unremarkable  There is a large hiatal hernia  Right hemidiaphragm is mildly elevated  There may be a trace left pleural effusion  Right lung is clear  No pneumothorax  Osseous structures appear within normal limits for patient age  Impression Possible trace left pleural effusion  Workstation performed: [id number]         PATIENT CENTERED ROUNDS: I have performed rounds with the nursing staff  This note has been constructed using a voice recognition system      BAUNAT, MD

## 2018-07-19 VITALS
BODY MASS INDEX: 21.77 KG/M2 | TEMPERATURE: 96.7 F | DIASTOLIC BLOOD PRESSURE: 54 MMHG | HEART RATE: 62 BPM | OXYGEN SATURATION: 95 % | RESPIRATION RATE: 18 BRPM | SYSTOLIC BLOOD PRESSURE: 111 MMHG | HEIGHT: 60 IN | WEIGHT: 110.89 LBS

## 2018-07-19 PROCEDURE — 99239 HOSP IP/OBS DSCHRG MGMT >30: CPT | Performed by: INTERNAL MEDICINE

## 2018-07-19 RX ORDER — FUROSEMIDE 20 MG/1
20 TABLET ORAL DAILY
Refills: 0
Start: 2018-07-20 | End: 2018-11-03 | Stop reason: SDUPTHER

## 2018-07-19 RX ORDER — NYSTATIN 100000 [USP'U]/G
POWDER TOPICAL 2 TIMES DAILY
Qty: 15 G | Refills: 0 | Status: SHIPPED | OUTPATIENT
Start: 2018-07-19 | End: 2018-11-26 | Stop reason: CLARIF

## 2018-07-19 RX ADMIN — NYSTATIN: 100000 POWDER TOPICAL at 16:43

## 2018-07-19 RX ADMIN — OXYCODONE HYDROCHLORIDE AND ACETAMINOPHEN 250 MG: 500 TABLET ORAL at 09:00

## 2018-07-19 RX ADMIN — NYSTATIN: 100000 POWDER TOPICAL at 09:00

## 2018-07-19 RX ADMIN — MEMANTINE HYDROCHLORIDE 10 MG: 5 TABLET ORAL at 09:00

## 2018-07-19 RX ADMIN — FERROUS GLUCONATE TAB 324 MG (37.5 MG ELEMENTAL IRON) 324 MG: 324 (37.5 FE) TAB at 11:13

## 2018-07-19 RX ADMIN — FUROSEMIDE 20 MG: 20 TABLET ORAL at 09:00

## 2018-07-19 RX ADMIN — HEPARIN SODIUM 5000 UNITS: 5000 INJECTION, SOLUTION INTRAVENOUS; SUBCUTANEOUS at 13:40

## 2018-07-19 RX ADMIN — DOCUSATE SODIUM 100 MG: 100 CAPSULE, LIQUID FILLED ORAL at 09:01

## 2018-07-19 RX ADMIN — RDII 250 MG CAPSULE 250 MG: at 09:00

## 2018-07-19 RX ADMIN — HEPARIN SODIUM 5000 UNITS: 5000 INJECTION, SOLUTION INTRAVENOUS; SUBCUTANEOUS at 05:10

## 2018-07-19 RX ADMIN — RISPERIDONE 0.25 MG: 0.25 TABLET ORAL at 16:43

## 2018-07-19 RX ADMIN — RISPERIDONE 0.25 MG: 0.25 TABLET ORAL at 11:14

## 2018-07-19 RX ADMIN — Medication 1 TABLET: at 08:59

## 2018-07-19 RX ADMIN — CITALOPRAM HYDROBROMIDE 10 MG: 10 TABLET ORAL at 09:01

## 2018-07-19 RX ADMIN — RDII 250 MG CAPSULE 250 MG: at 16:42

## 2018-07-19 RX ADMIN — FAMOTIDINE 10 MG: 20 TABLET ORAL at 09:00

## 2018-07-19 NOTE — SOCIAL WORK
Continue to follow  Spoke with Pat in [facility] admissions, facility can accept Pt today  Met with Pt's dtr(Pauline) in agreement for discharge today to [facility]  Pt informed of discharge to [facility] today  Spoke with Valarie Mendoza at [place], transportation arranged via [address] to [facility]  Pickup is 5:30pm  Pt's dtr aware and in agreement  [facility] admissions informed of transport  Faxed discharge instructions and PASRR to [facility]

## 2018-07-19 NOTE — DISCHARGE SUMMARY
Discharge Summary - Torie Moser 80 y o  female MRN: [id number]    Unit/Bed#: [address] [place], [place] [address] Encounter: [id number]    Admission Date: 7/16/2018     Admitting Diagnosis: UTI (urinary tract infection) [N39 0]  SOB (shortness of breath) [R06 02]  Pleural effusion [J90]  Encephalopathy acute [G93 40]    HPI:  80-year-old female presented with shortness of breath from her primary physician's office and worsening encephalopathy  Consults  Cardiology-Dr Birdie Larkin   Procedures Performed:   Orders Placed This Encounter   Procedures    ED ECG Documentation Only       Hospital Course:  Patient was admitted and placed on IV diuretics and empiric antibiotic therapy for presumed UTI  Her urine culture showed mixed growth so this was then discontinued  Patient was seen in consultation by Cardiology as there is concern that her aortic stenosis was playing a significant role in the shortness of breath and pleural effusions that have been detected on admission with mild CHF  Her echo showed moderate aortic stenosis not critical and there was some diastolic dysfunction which is the most likely cause of her dyspnea and edema  That improved with diuretics and patient was switched to oral medications  Because of her weakness and deconditioning was felt that she would benefit from short-term rehab at skilled nursing facility [facility]  Family may consider for long-term care there if she is not able to improved to her prior baseline  Significant Findings, Care, Treatment and Services Provided:  CHEST      INDICATION: Review of Dr Linnette Kiran progress note from today indicates shortness of breath as an active problem      COMPARISON:  7/16/2018, 6/20/2017     EXAM PERFORMED/VIEWS:  XR CHEST PA & LATERAL        FINDINGS:     Cardiac silhouette is possibly enlarged, difficult to evaluate true size given presence of a large hiatal hernia  Mediastinal and hilar contours are within normal limits      The lungs are clear    No vascular congestion, pneumothorax or pleural effusion      Degenerative changes of the spine  Mild superior endplate compression fracture of T12, chronic  Surgical clips left axilla      IMPRESSION:     No acute cardiopulmonary disease      Large hiatal hernia  General appearance: cooperative, distracted  Neck: no adenopathy, no JVD and thyroid not enlarged, symmetric, no tenderness/mass/nodules  Lungs: clear to auscultation bilaterally  Heart: regular rate and rhythm and Grade 3/6 systolic murmur in the aortic region  Abdomen: soft, non-tender; bowel sounds normal; no masses,  no organomegaly  Extremities: extremities normal, warm and well-perfused; no cyanosis, clubbing, or edema  Skin: Skin color, texture, turgor normal  No rashes or lesions  Neurologic:  No focal motor deficits       Complications: none    Discharge Diagnosis: Principal Problem:    Muscular deconditioning  Active Problems:    SOB (shortness of breath)    Aortic stenosis, severe    Chronic kidney disease, stage 3    Fungal dermatitis    Dementia    Gait disturbance    Pedal edema    Chronic diastolic CHF (congestive heart failure) (HCC)        Condition at Discharge: good     Discharge instructions/Information to patient and family:   See after visit summary for information provided to patient and family  Provisions for Follow-Up Care:  See after visit summary for information related to follow-up care and any pertinent home health orders  Disposition: Skilled nursing facility at [facility] for acute rehab    Planned Readmission: No    Discharge Statement   I spent 30 minutes discharging the patient  This time was spent on the day of discharge  I had direct contact with the patient on the day of discharge  Additional documentation is required if more than 30 minutes were spent on discharge  Discharge Medications:  See after visit summary for reconciled discharge medications provided to patient and family          Isa Rain DO

## 2018-07-21 ENCOUNTER — TRANSITIONAL CARE MANAGEMENT (OUTPATIENT)
Dept: FAMILY MEDICINE CLINIC | Facility: CLINIC | Age: 83
End: 2018-07-21

## 2018-07-21 NOTE — PHYSICIAN ADVISOR
Current patient class: Inpatient  The patient is currently on Hospital Day: 4 at [place] [address]      The patient was admitted to the hospital at [phone] on 7/16/18 for the following diagnosis:  UTI (urinary tract infection) [N39 0]  SOB (shortness of breath) [R06 02]  Pleural effusion [J90]  Encephalopathy acute [G93 40]       There is documentation in the medical record of an expected length of stay of at least 2 midnights  The patient is therefore expected to satisfy the 2 midnight benchmark and given the 2 midnight presumption is appropriate for INPATIENT ADMISSION  Given this expectation of a satisfying stay, CMS instructs us that the patient is most often appropriate for inpatient admission under part A provided medical necessity is documented in the chart  After review of the relevant documentation, labs, vital signs and test results, the patient is appropriate for INPATIENT ADMISSION  Admission to the hospital as an inpatient is a complex decision making process which requires the practitioner to consider the patients presenting complaint, history and physical examination and all relevant testing  With this in mind, in this case, the patient was deemed appropriate for INPATIENT ADMISSION  After review of the documentation and testing available at the time of the admission I concur with this clinical determination of medical necessity  Rationale is as follows: The patient is a 80 yrs old Female who presented to the ED at 7/16/2018  4:45 PM with a chief complaint of Leg Swelling (Patient presents from ED for b/l lower extremetity swelling and increase weakness, over the past couple of days  Patient was seen at PCP today for UTI and was sent to ED for r/o chf)     Given the need for further hospitalization, and along with the documentation of medical necessity present in the chart, the patient is appropriate for inpatient admission    The patient is expected to satisfy the 2 midnight benchmark, and will require further acute medical care  The patient does have comorbid conditions which increases the risk for significant adverse outcome  Given this the patient is appropriate for inpatient admission        The patients vitals on arrival were ED Triage Vitals [07/16/18 1642]   Temperature Pulse Respirations Blood Pressure SpO2   98 3 °F (36 8 °C) 62 19 146/65 97 %      Temp Source Heart Rate Source Patient Position - Orthostatic VS BP Location FiO2 (%)   Tympanic Monitor Lying Right arm --      Pain Score       No Pain           Past Medical History:   Diagnosis Date    Aortic valve stenosis     Breast cancer ([facility] [address] )     last assessed 04/22/2014    CKD (chronic kidney disease)     hypertensive; last assessed 07/19/13    Closed compression fracture of lumbar vertebra ([facility] [address] )     last assessed 07/19/13    Clostridium difficile colitis     last assessed 01/18/13    Costochondritis     Dementia     Depression     Diverticulitis of colon     Generalized osteoarthritis of hand     last assessed 06/18/13    GERD (gastroesophageal reflux disease)     Herpes zoster     last assessed 01/30/17    Hiatal hernia     Hypertension     last assessed 11/20/2013    Jaw disease     Neoplasm of uncertain behavior of skin     last assessed 09/03/2013    Overactive bladder     Urge incontinence of urine     last assessed 04/29/15    UTI (urinary tract infection)      Past Surgical History:   Procedure Laterality Date    BREAST LUMPECTOMY Left     BREAST SURGERY      CHOLECYSTECTOMY      HYSTERECTOMY             Consults have been placed to:   IP CONSULT TO CARDIOLOGY  IP CONSULT TO CASE MANAGEMENT    Vitals:    07/18/18 2246 07/19/18 0238 07/19/18 0743 07/19/18 1506   BP: (!) 94/46 105/52 130/60 111/54   BP Location: Right arm Right arm Right arm Right arm   Pulse: 58  (!) 54 62   Resp: 16 18 18   Temp: 98 4 °F (36 9 °C)  (!) 96 3 °F (35 7 °C) (!) 96 7 °F (35 9 °C)   TempSrc: Oral Tympanic Tympanic   SpO2: 98%  96% 95%   Weight:   50 3 kg (110 lb 14 3 oz)    Height:           Most recent labs:    Recent Labs      07/18/18   0528   WBC  5 70   HGB  10 8*   HCT  32 0*   PLT  181   K  4 1   NA  139   CALCIUM  8 4   BUN  37*   CREATININE  1 16       Scheduled Meds:  Continuous Infusions:  No current facility-administered medications for this encounter  PRN Meds:      Surgical procedures (if appropriate):

## 2018-07-23 ENCOUNTER — TRANSITIONAL CARE MANAGEMENT (OUTPATIENT)
Dept: FAMILY MEDICINE CLINIC | Facility: CLINIC | Age: 83
End: 2018-07-23

## 2018-08-14 ENCOUNTER — TELEPHONE (OUTPATIENT)
Dept: FAMILY MEDICINE CLINIC | Facility: CLINIC | Age: 83
End: 2018-08-14

## 2018-08-14 NOTE — TELEPHONE ENCOUNTER
TANIKA, got a call about this patient from a company called "[employer]" stating that her request for additional medicare coverage has been denied  I am not sure if you need to know this, but they asked me to pass the message along  Thank you

## 2018-08-14 NOTE — TELEPHONE ENCOUNTER
[facility] is requesting a order for wheel chair with cushion back and cushion seat  Will you write?

## 2018-08-17 ENCOUNTER — TELEPHONE (OUTPATIENT)
Dept: FAMILY MEDICINE CLINIC | Facility: CLINIC | Age: 83
End: 2018-08-17

## 2018-08-17 NOTE — TELEPHONE ENCOUNTER
FYI,  Pt was discharged from [facility] to [facility], pt will be getting home care services through [facility], PT, OT, & nursing

## 2018-08-28 DIAGNOSIS — G30.1 LATE ONSET ALZHEIMER'S DISEASE WITH BEHAVIORAL DISTURBANCE (HCC): ICD-10-CM

## 2018-08-28 DIAGNOSIS — F02.81 LATE ONSET ALZHEIMER'S DISEASE WITH BEHAVIORAL DISTURBANCE (HCC): ICD-10-CM

## 2018-08-28 RX ORDER — RISPERIDONE 0.25 MG/1
0.25 TABLET, FILM COATED ORAL 2 TIMES DAILY
Qty: 60 TABLET | Refills: 2 | Status: SHIPPED | OUTPATIENT
Start: 2018-08-28 | End: 2018-11-08 | Stop reason: SDUPTHER

## 2018-09-05 ENCOUNTER — TELEPHONE (OUTPATIENT)
Dept: FAMILY MEDICINE CLINIC | Facility: CLINIC | Age: 83
End: 2018-09-05

## 2018-09-05 NOTE — TELEPHONE ENCOUNTER
Patricia called from [facility] - Pt gained 3 lb's yesterday and still the same today  Does have bi-lateral edema   /62 pulse 64, No SOB  Please call Patricia if needed at [phone]

## 2018-09-19 ENCOUNTER — TELEPHONE (OUTPATIENT)
Dept: FAMILY MEDICINE CLINIC | Facility: CLINIC | Age: 83
End: 2018-09-19

## 2018-09-19 NOTE — TELEPHONE ENCOUNTER
Nursing home called to report a 3lbs weight gain for patient  She was out of her Lasix for a few days but was able to restart it today  They will continue to monitor her weight now that she is back on the lasix

## 2018-09-19 NOTE — TELEPHONE ENCOUNTER
Prasanth Gomez called stating pt had a 3 lb wt gain overnight , edema & weaping in lower ext, pt is wearing bhavana stockings, pt started back on her Lasix today, pt had a 5 day laps between her last dose that was given on 9/13/18, Prasanth Gomez states there was a mix up between the family and the pharmacy on who was going to prescribe it, pt denies chest pain and SOB, Prasanth Gomez states for orders you can call [phone]    Ilya Chiang called back to inform you that because of pt's change of status they are adding a skilled nurse visit for tomorrow

## 2018-09-24 ENCOUNTER — TELEPHONE (OUTPATIENT)
Dept: FAMILY MEDICINE CLINIC | Facility: CLINIC | Age: 83
End: 2018-09-24

## 2018-09-24 NOTE — TELEPHONE ENCOUNTER
CALLING TO INFORM YOU THAT FAMILIA FELL AT 12:15AM   NO INJURIES REPORTED  ADDING OT 1 X A WEEK FOR TWO WEEKS  CHECKING FOR UTI

## 2018-09-26 ENCOUNTER — TELEPHONE (OUTPATIENT)
Dept: FAMILY MEDICINE CLINIC | Facility: CLINIC | Age: 83
End: 2018-09-26

## 2018-09-26 NOTE — TELEPHONE ENCOUNTER
[facility] home care  Eleonora Hammonds - [phone]    UA was contaminated, will redo, she is not emptying fully  Estefani Christianson has had 3 falls in the past couple of days, trying to get in and out of bed on her own  No injuries or pain  They will be faxing for possible neurology consult for her parkinson sx, to possible adjust med she is using  She is very tearful and seems quite depressed, will fax that over, possible change in anti depressant or do you want to see her

## 2018-09-27 ENCOUNTER — TELEPHONE (OUTPATIENT)
Dept: FAMILY MEDICINE CLINIC | Facility: CLINIC | Age: 83
End: 2018-09-27

## 2018-09-27 NOTE — TELEPHONE ENCOUNTER
Please contact [address] in [facility]  [phone]  Cameron Bella  has a urinary tract infection with Enterococcus    I will call the pharmacy for ampicillin 500 mg 3 times a day for 10 days if you tell me which pharmacy to call it into

## 2018-10-10 DIAGNOSIS — F33.41 RECURRENT MAJOR DEPRESSIVE DISORDER, IN PARTIAL REMISSION (HCC): ICD-10-CM

## 2018-10-10 RX ORDER — CITALOPRAM 10 MG/1
TABLET ORAL
Qty: 30 TABLET | Refills: 5 | Status: SHIPPED | OUTPATIENT
Start: 2018-10-10 | End: 2019-03-25 | Stop reason: SDUPTHER

## 2018-10-11 ENCOUNTER — TELEPHONE (OUTPATIENT)
Dept: FAMILY MEDICINE CLINIC | Facility: CLINIC | Age: 83
End: 2018-10-11

## 2018-10-11 NOTE — TELEPHONE ENCOUNTER
Troy Russo from [facility], she just wants to let you know that she is recertifing patient for OT, she has fallen several times and has had a UTI

## 2018-10-19 ENCOUNTER — TELEPHONE (OUTPATIENT)
Dept: FAMILY MEDICINE CLINIC | Facility: CLINIC | Age: 83
End: 2018-10-19

## 2018-10-19 NOTE — TELEPHONE ENCOUNTER
Tahir at [address] [place] [address] 1 reporting a 3 1/2 lb weight gain for Carl Valdez      She was 123, yesterday 126, today 126 5, she has no sx, lungs clear, some edema in legs

## 2018-10-26 ENCOUNTER — OFFICE VISIT (OUTPATIENT)
Dept: FAMILY MEDICINE CLINIC | Facility: CLINIC | Age: 83
End: 2018-10-26
Payer: MEDICARE

## 2018-10-26 VITALS
DIASTOLIC BLOOD PRESSURE: 70 MMHG | BODY MASS INDEX: 26.11 KG/M2 | OXYGEN SATURATION: 98 % | TEMPERATURE: 97.5 F | HEIGHT: 60 IN | HEART RATE: 67 BPM | SYSTOLIC BLOOD PRESSURE: 124 MMHG | WEIGHT: 133 LBS

## 2018-10-26 DIAGNOSIS — F02.81 LATE ONSET ALZHEIMER'S DISEASE WITH BEHAVIORAL DISTURBANCE (HCC): ICD-10-CM

## 2018-10-26 DIAGNOSIS — Z23 NEED FOR VACCINATION: ICD-10-CM

## 2018-10-26 DIAGNOSIS — G30.1 LATE ONSET ALZHEIMER'S DISEASE WITH BEHAVIORAL DISTURBANCE (HCC): ICD-10-CM

## 2018-10-26 DIAGNOSIS — I50.32 CHRONIC DIASTOLIC CHF (CONGESTIVE HEART FAILURE) (HCC): Primary | Chronic | ICD-10-CM

## 2018-10-26 DIAGNOSIS — I35.0 AORTIC STENOSIS, SEVERE: ICD-10-CM

## 2018-10-26 PROCEDURE — 90662 IIV NO PRSV INCREASED AG IM: CPT

## 2018-10-26 PROCEDURE — G0008 ADMIN INFLUENZA VIRUS VAC: HCPCS

## 2018-10-26 PROCEDURE — 99213 OFFICE O/P EST LOW 20 MIN: CPT | Performed by: FAMILY MEDICINE

## 2018-10-26 RX ORDER — IBUPROFEN 200 MG
200 TABLET ORAL EVERY 6 HOURS PRN
COMMUNITY
End: 2020-12-28 | Stop reason: HOSPADM

## 2018-10-26 RX ORDER — CARBIDOPA AND LEVODOPA 25; 100 MG/1; MG/1
1 TABLET, ORALLY DISINTEGRATING ORAL 3 TIMES DAILY
COMMUNITY
End: 2018-10-31 | Stop reason: SDUPTHER

## 2018-10-26 RX ORDER — ALPRAZOLAM 0.25 MG/1
0.25 TABLET ORAL
COMMUNITY
End: 2019-10-06 | Stop reason: SDUPTHER

## 2018-10-26 RX ORDER — ACETAMINOPHEN 325 MG/1
325 TABLET ORAL EVERY 6 HOURS PRN
COMMUNITY
End: 2020-09-23 | Stop reason: SDUPTHER

## 2018-10-27 NOTE — PROGRESS NOTES
Subjective:   Chief Complaint   Patient presents with    Follow-up     pt here for f/u        Patient ID: Gene Syed is a 80 y o  female  HPI    The following portions of the patient's history were reviewed and updated as appropriate: allergies, current medications, past family history, past medical history, past social history, past surgical history and problem list     Review of Systems   Constitutional: Negative for fatigue and fever  HENT: Negative  Respiratory: Negative  Negative for cough  Cardiovascular: Negative  Gastrointestinal: Negative  Genitourinary: Negative  Musculoskeletal: Negative  Skin: Negative  Neurological: Negative  Psychiatric/Behavioral: Negative  Objective:  Vitals:    10/26/18 1448   BP: 124/70   Pulse: 67   Temp: 97 5 °F (36 4 °C)   SpO2: 98%   Weight: 60 3 kg (133 lb)   Height: 5' (1 524 m)      Physical Exam   Constitutional: She is oriented to person, place, and time  She appears well-developed and well-nourished  HENT:   Head: Normocephalic and atraumatic  Cardiovascular: Normal rate and regular rhythm  Murmur heard  Pulmonary/Chest: Effort normal and breath sounds normal    Abdominal: Soft  Bowel sounds are normal    Neurological: She is alert and oriented to person, place, and time  Skin: Skin is warm and dry  Psychiatric: She has a normal mood and affect  Her behavior is normal  Judgment and thought content normal    Nursing note and vitals reviewed  Assessment/Plan:    No problem-specific Assessment & Plan notes found for this encounter  Diagnoses and all orders for this visit:    Chronic diastolic CHF (congestive heart failure) ([facility] [address] )    Aortic stenosis, severe    Late onset Alzheimer's disease with behavioral disturbance    Need for vaccination  -     influenza vaccine, 8606-9453, high-dose, PF 0 5 mL, for patients 65 yr+ (FLUZONE HIGH-DOSE)    Other orders  -     ALPRAZolam (XANAX) 0 25 mg tablet;  Take 0 25 mg by mouth daily at bedtime as needed for anxiety  -     acetaminophen (TYLENOL) 325 mg tablet; Take 325 mg by mouth every 6 (six) hours as needed for mild pain  -     ibuprofen (MOTRIN) 200 mg tablet; Take 200 mg by mouth every 6 (six) hours as needed for mild pain  -     carbidopa-levodopa (PARCOPA)  mg per disintegrating tablet;  Take 1 tablet by mouth 3 (three) times a day

## 2018-10-31 DIAGNOSIS — G20 PARKINSON DISEASE (HCC): Primary | ICD-10-CM

## 2018-10-31 RX ORDER — CARBIDOPA AND LEVODOPA 25; 100 MG/1; MG/1
1 TABLET, ORALLY DISINTEGRATING ORAL 3 TIMES DAILY
Qty: 90 TABLET | Refills: 3 | Status: SHIPPED | OUTPATIENT
Start: 2018-10-31 | End: 2018-11-08 | Stop reason: SDUPTHER

## 2018-11-03 DIAGNOSIS — I50.32 CHRONIC DIASTOLIC CHF (CONGESTIVE HEART FAILURE) (HCC): Chronic | ICD-10-CM

## 2018-11-03 DIAGNOSIS — F02.80 SDAT (SENILE DEMENTIA OF ALZHEIMER'S TYPE) (HCC): ICD-10-CM

## 2018-11-03 DIAGNOSIS — G30.1 SDAT (SENILE DEMENTIA OF ALZHEIMER'S TYPE) (HCC): ICD-10-CM

## 2018-11-03 RX ORDER — FUROSEMIDE 20 MG/1
20 TABLET ORAL DAILY
Refills: 0
Start: 2018-11-03 | End: 2018-11-08 | Stop reason: SDUPTHER

## 2018-11-03 RX ORDER — MEMANTINE HYDROCHLORIDE 14 MG/1
CAPSULE, EXTENDED RELEASE ORAL
Qty: 30 CAPSULE | Refills: 5 | Status: SHIPPED | OUTPATIENT
Start: 2018-11-03 | End: 2018-11-26 | Stop reason: CLARIF

## 2018-11-03 NOTE — TELEPHONE ENCOUNTER
Order by daughter  She is assisted living and when they order from there 2 x expensive  [facility] called they do not cardidopa-levodota regular pills they are out of the other pills  Daughter is ok with it      Memantidine she also needs that

## 2018-11-05 ENCOUNTER — TELEPHONE (OUTPATIENT)
Dept: FAMILY MEDICINE CLINIC | Facility: CLINIC | Age: 83
End: 2018-11-05

## 2018-11-05 NOTE — TELEPHONE ENCOUNTER
PHARMACY IS OUT OF THE CARBIDOPA DISINTEGRATING TABLET  CAN SHE HAVE THE REGULAR TABLET?     PLEASE [facility]

## 2018-11-08 DIAGNOSIS — G20 PARKINSON DISEASE (HCC): ICD-10-CM

## 2018-11-08 DIAGNOSIS — I50.32 CHRONIC DIASTOLIC CHF (CONGESTIVE HEART FAILURE) (HCC): Chronic | ICD-10-CM

## 2018-11-08 DIAGNOSIS — G30.1 LATE ONSET ALZHEIMER'S DISEASE WITH BEHAVIORAL DISTURBANCE (HCC): ICD-10-CM

## 2018-11-08 DIAGNOSIS — F02.81 LATE ONSET ALZHEIMER'S DISEASE WITH BEHAVIORAL DISTURBANCE (HCC): ICD-10-CM

## 2018-11-08 RX ORDER — CARBIDOPA AND LEVODOPA 25; 100 MG/1; MG/1
1 TABLET, ORALLY DISINTEGRATING ORAL 3 TIMES DAILY
Qty: 90 TABLET | Refills: 0 | Status: SHIPPED | OUTPATIENT
Start: 2018-11-08 | End: 2018-12-06 | Stop reason: SDUPTHER

## 2018-11-08 RX ORDER — FUROSEMIDE 20 MG/1
20 TABLET ORAL DAILY
Refills: 0
Start: 2018-11-08 | End: 2018-11-09 | Stop reason: SDUPTHER

## 2018-11-08 RX ORDER — RISPERIDONE 0.25 MG/1
0.25 TABLET, FILM COATED ORAL 2 TIMES DAILY
Qty: 60 TABLET | Refills: 0 | Status: SHIPPED | OUTPATIENT
Start: 2018-11-08 | End: 2018-11-09 | Stop reason: SDUPTHER

## 2018-11-09 DIAGNOSIS — I50.32 CHRONIC DIASTOLIC CHF (CONGESTIVE HEART FAILURE) (HCC): Chronic | ICD-10-CM

## 2018-11-09 DIAGNOSIS — F02.81 LATE ONSET ALZHEIMER'S DISEASE WITH BEHAVIORAL DISTURBANCE (HCC): ICD-10-CM

## 2018-11-09 DIAGNOSIS — G30.1 LATE ONSET ALZHEIMER'S DISEASE WITH BEHAVIORAL DISTURBANCE (HCC): ICD-10-CM

## 2018-11-09 RX ORDER — FUROSEMIDE 20 MG/1
20 TABLET ORAL DAILY
Qty: 30 TABLET | Refills: 0 | Status: SHIPPED | OUTPATIENT
Start: 2018-11-09 | End: 2018-11-30 | Stop reason: SDUPTHER

## 2018-11-09 RX ORDER — RISPERIDONE 0.25 MG/1
0.25 TABLET, FILM COATED ORAL 2 TIMES DAILY
Qty: 60 TABLET | Refills: 0 | Status: SHIPPED | OUTPATIENT
Start: 2018-11-09 | End: 2018-11-30 | Stop reason: SDUPTHER

## 2018-11-26 ENCOUNTER — OFFICE VISIT (OUTPATIENT)
Dept: FAMILY MEDICINE CLINIC | Facility: CLINIC | Age: 83
End: 2018-11-26
Payer: MEDICARE

## 2018-11-26 VITALS
SYSTOLIC BLOOD PRESSURE: 122 MMHG | HEART RATE: 67 BPM | DIASTOLIC BLOOD PRESSURE: 66 MMHG | TEMPERATURE: 96.2 F | OXYGEN SATURATION: 96 %

## 2018-11-26 DIAGNOSIS — F02.81 LATE ONSET ALZHEIMER'S DISEASE WITH BEHAVIORAL DISTURBANCE (HCC): ICD-10-CM

## 2018-11-26 DIAGNOSIS — S80.812A ABRASION OF ANTERIOR LOWER LEG, LEFT, INITIAL ENCOUNTER: Primary | ICD-10-CM

## 2018-11-26 DIAGNOSIS — G30.1 LATE ONSET ALZHEIMER'S DISEASE WITH BEHAVIORAL DISTURBANCE (HCC): ICD-10-CM

## 2018-11-26 PROCEDURE — 99213 OFFICE O/P EST LOW 20 MIN: CPT | Performed by: FAMILY MEDICINE

## 2018-11-26 NOTE — PATIENT INSTRUCTIONS
Lotion to legs bilaterally to prevent skin breakdown  Change dressing on left lower leg daily and watch for signs of infection, redness, drainage     Continue current medications

## 2018-11-27 PROBLEM — S80.812A: Status: ACTIVE | Noted: 2018-11-27

## 2018-11-27 NOTE — PROGRESS NOTES
Subjective:   Chief Complaint   Patient presents with    Follow-up     monthly check up  Patient unsure why she is here  Patient ID: Fernando Sanchez is a 80 y o  female  Pt here in follow up to chronic medical conditions  No recent illnesses  Pt lives in nursing home  Has abrasion on left lower leg, denies pain and states she does not remember how she did it  bms normal        The following portions of the patient's history were reviewed and updated as appropriate: allergies, current medications, past family history, past medical history, past social history, past surgical history and problem list     Review of Systems   Constitutional: Negative  Negative for fatigue and fever  HENT: Negative  Eyes: Negative  Respiratory: Negative  Negative for cough  Cardiovascular: Negative  Gastrointestinal: Negative  Endocrine: Negative  Genitourinary: Negative  Musculoskeletal: Negative  Skin: Negative  Allergic/Immunologic: Negative  Neurological: Negative  Psychiatric/Behavioral: Positive for decreased concentration  Objective:  Vitals:    11/26/18 1522   BP: 122/66   Pulse: 67   Temp: (!) 96 2 °F (35 7 °C)   SpO2: 96%      Physical Exam   Constitutional: She is oriented to person, place, and time  She appears well-developed and well-nourished  HENT:   Head: Normocephalic and atraumatic  Cardiovascular: Normal rate, regular rhythm and normal heart sounds  Pulmonary/Chest: Effort normal and breath sounds normal    Abdominal: Soft  Bowel sounds are normal    Neurological: She is alert and oriented to person, place, and time  Skin: Skin is warm and dry  Psychiatric: She has a normal mood and affect  Her behavior is normal  Judgment and thought content normal    Nursing note and vitals reviewed  Assessment/Plan:    No problem-specific Assessment & Plan notes found for this encounter         Diagnoses and all orders for this visit:    Abrasion of anterior lower leg, left, initial encounter: observe for signs of infection  Dry legs, advised to use lotion to bilateral lower legs daily    Late onset Alzheimer's disease with behavioral disturbance: stable ,    Continue current medications

## 2018-11-30 DIAGNOSIS — F02.81 LATE ONSET ALZHEIMER'S DISEASE WITH BEHAVIORAL DISTURBANCE (HCC): ICD-10-CM

## 2018-11-30 DIAGNOSIS — I50.32 CHRONIC DIASTOLIC CHF (CONGESTIVE HEART FAILURE) (HCC): Chronic | ICD-10-CM

## 2018-11-30 DIAGNOSIS — G30.1 LATE ONSET ALZHEIMER'S DISEASE WITH BEHAVIORAL DISTURBANCE (HCC): ICD-10-CM

## 2018-12-01 RX ORDER — RISPERIDONE 0.25 MG/1
0.25 TABLET, FILM COATED ORAL 2 TIMES DAILY
Qty: 60 TABLET | Refills: 0 | Status: SHIPPED | OUTPATIENT
Start: 2018-12-01 | End: 2019-03-28 | Stop reason: ALTCHOICE

## 2018-12-01 RX ORDER — FUROSEMIDE 20 MG/1
20 TABLET ORAL DAILY
Qty: 30 TABLET | Refills: 2 | Status: SHIPPED | OUTPATIENT
Start: 2018-12-01 | End: 2019-03-25 | Stop reason: SDUPTHER

## 2018-12-06 DIAGNOSIS — G20 PARKINSON DISEASE (HCC): ICD-10-CM

## 2018-12-31 ENCOUNTER — HOSPITAL ENCOUNTER (EMERGENCY)
Facility: HOSPITAL | Age: 83
Discharge: HOME | End: 2018-12-31
Attending: EMERGENCY MEDICINE
Payer: MEDICARE

## 2018-12-31 ENCOUNTER — APPOINTMENT (EMERGENCY)
Dept: RADIOLOGY | Facility: HOSPITAL | Age: 83
End: 2018-12-31
Payer: MEDICARE

## 2018-12-31 ENCOUNTER — APPOINTMENT (EMERGENCY)
Dept: CT IMAGING | Facility: HOSPITAL | Age: 83
End: 2018-12-31
Payer: MEDICARE

## 2018-12-31 VITALS
RESPIRATION RATE: 16 BRPM | WEIGHT: 130 LBS | OXYGEN SATURATION: 95 % | DIASTOLIC BLOOD PRESSURE: 66 MMHG | TEMPERATURE: 98.1 F | BODY MASS INDEX: 25.39 KG/M2 | HEART RATE: 68 BPM | SYSTOLIC BLOOD PRESSURE: 132 MMHG

## 2018-12-31 DIAGNOSIS — N18.30 CHRONIC KIDNEY DISEASE, STAGE 3 (HCC): Chronic | ICD-10-CM

## 2018-12-31 DIAGNOSIS — R29.6 UNWITNESSED FALL: Primary | ICD-10-CM

## 2018-12-31 LAB
ANION GAP SERPL CALCULATED.3IONS-SCNC: 7 MMOL/L (ref 4–13)
BASOPHILS # BLD AUTO: 0.05 THOUSANDS/ΜL (ref 0–0.1)
BASOPHILS NFR BLD AUTO: 1 % (ref 0–1)
BUN SERPL-MCNC: 31 MG/DL (ref 5–25)
CALCIUM SERPL-MCNC: 8.6 MG/DL (ref 8.3–10.1)
CHLORIDE SERPL-SCNC: 102 MMOL/L (ref 100–108)
CO2 SERPL-SCNC: 30 MMOL/L (ref 21–32)
CREAT SERPL-MCNC: 1.32 MG/DL (ref 0.6–1.3)
EOSINOPHIL # BLD AUTO: 0.36 THOUSAND/ΜL (ref 0–0.61)
EOSINOPHIL NFR BLD AUTO: 4 % (ref 0–6)
ERYTHROCYTE [DISTWIDTH] IN BLOOD BY AUTOMATED COUNT: 12.5 % (ref 11.6–15.1)
GFR SERPL CREATININE-BSD FRML MDRD: 34 ML/MIN/1.73SQ M
GLUCOSE SERPL-MCNC: 114 MG/DL (ref 65–140)
HCT VFR BLD AUTO: 36.3 % (ref 34.8–46.1)
HGB BLD-MCNC: 11.9 G/DL (ref 11.5–15.4)
IMM GRANULOCYTES # BLD AUTO: 0.04 THOUSAND/UL (ref 0–0.2)
IMM GRANULOCYTES NFR BLD AUTO: 1 % (ref 0–2)
LYMPHOCYTES # BLD AUTO: 1.95 THOUSANDS/ΜL (ref 0.6–4.47)
LYMPHOCYTES NFR BLD AUTO: 24 % (ref 14–44)
MCH RBC QN AUTO: 31.9 PG (ref 26.8–34.3)
MCHC RBC AUTO-ENTMCNC: 32.8 G/DL (ref 31.4–37.4)
MCV RBC AUTO: 97 FL (ref 82–98)
MONOCYTES # BLD AUTO: 0.86 THOUSAND/ΜL (ref 0.17–1.22)
MONOCYTES NFR BLD AUTO: 11 % (ref 4–12)
NEUTROPHILS # BLD AUTO: 4.93 THOUSANDS/ΜL (ref 1.85–7.62)
NEUTS SEG NFR BLD AUTO: 59 % (ref 43–75)
NRBC BLD AUTO-RTO: 0 /100 WBCS
PLATELET # BLD AUTO: 206 THOUSANDS/UL (ref 149–390)
PMV BLD AUTO: 10.5 FL (ref 8.9–12.7)
POTASSIUM SERPL-SCNC: 4.8 MMOL/L (ref 3.5–5.3)
RBC # BLD AUTO: 3.73 MILLION/UL (ref 3.81–5.12)
SODIUM SERPL-SCNC: 139 MMOL/L (ref 136–145)
TROPONIN I SERPL-MCNC: <0.02 NG/ML
WBC # BLD AUTO: 8.19 THOUSAND/UL (ref 4.31–10.16)

## 2018-12-31 PROCEDURE — 93005 ELECTROCARDIOGRAM TRACING: CPT

## 2018-12-31 PROCEDURE — 70450 CT HEAD/BRAIN W/O DYE: CPT

## 2018-12-31 PROCEDURE — 71046 X-RAY EXAM CHEST 2 VIEWS: CPT

## 2018-12-31 PROCEDURE — 36415 COLL VENOUS BLD VENIPUNCTURE: CPT | Performed by: PHYSICIAN ASSISTANT

## 2018-12-31 PROCEDURE — 84484 ASSAY OF TROPONIN QUANT: CPT | Performed by: PHYSICIAN ASSISTANT

## 2018-12-31 PROCEDURE — 80048 BASIC METABOLIC PNL TOTAL CA: CPT | Performed by: PHYSICIAN ASSISTANT

## 2018-12-31 PROCEDURE — 99285 EMERGENCY DEPT VISIT HI MDM: CPT

## 2018-12-31 PROCEDURE — 85025 COMPLETE CBC W/AUTO DIFF WBC: CPT | Performed by: PHYSICIAN ASSISTANT

## 2018-12-31 RX ADMIN — SODIUM CHLORIDE 500 ML: 0.9 INJECTION, SOLUTION INTRAVENOUS at 21:20

## 2019-01-01 NOTE — DISCHARGE INSTRUCTIONS
Fall Prevention for Older Adults   WHAT YOU NEED TO KNOW:   As you age, your muscles weaken and your risk for falls increases  Your risk also increases if you take medicines that make you sleepy or dizzy  You may also be at risk if you have vision or joint problems, have low blood pressure, or are not active  DISCHARGE INSTRUCTIONS:   Call 911 or have someone else call if:   · You have fallen and are unconscious  · You have fallen and cannot move part of your body  Contact your healthcare provider if:   · You have fallen and have pain or a headache  · You have questions or concerns about your condition or care  Fall prevention tips:   · Stay active  Exercise can help strengthen your muscles and improve your balance  Your healthcare provider may recommend water aerobics, walking, or Len Chi  He may also recommend physical therapy to improve your coordination  Never start an exercise program without asking your healthcare provider first     · Wear shoes that fit well and have soles that   Wear shoes both inside and outside  Use slippers with good   Avoid shoes with high heels  · Use assistive devices as directed  Your healthcare provider may suggest that you use a cane or walker to help you keep your balance  You may need to have grab bars put in your bathroom near the toilet or in the shower  · Stand or sit up slowly  This may help you keep your balance and prevent falls  · Wear a personal alarm  This is a device that allows you to call 911 if you need help  Ask for more information on personal alarms  · Manage your medical conditions  Keep all appointments with your healthcare providers  Visit your eye doctor as directed  Home safety tips:   · Add items to prevent falls in the bathroom  Put nonslip strips on your bath or shower floor to prevent you from slipping  Use a bath mat if you do not have carpet in the bathroom   This will prevent you from falling when you step out of the bath or shower  Use a shower seat so you do not need to stand while you shower  Sit on the toilet or a chair in your bathroom to dry yourself and put on clothing  This will prevent you from losing your balance from drying or dressing yourself while you are standing  · Keep paths clear  Remove books, shoes, and other objects from walkways and stairs  Place cords for telephones and lamps out of the way so that you do not need to walk over them  Tape them down if you cannot move them  Remove small rugs  If you cannot remove a rug, secure it with double-sided tape  This will prevent you from tripping  · Install bright lights in your home  Use night lights to help light paths to the bathroom or kitchen  Always turn on the light before you start walking  · Keep items you use often on shelves within reach  Do not use a step stool to help you reach an item  · Paint or place reflective tape on the edges of your stairs  This will help you see the stairs better  Follow up with your healthcare provider as directed:  Write down your questions so you remember to ask them during your visits  © 2017 [employer] Information is for End User's use only and may not be sold, redistributed or otherwise used for commercial purposes  All illustrations and images included in CareNotes® are the copyrighted property of A [employer] M [employer] Inc  or [employer]  The above information is an  only  It is not intended as medical advice for individual conditions or treatments  Talk to your doctor, nurse or pharmacist before following any medical regimen to see if it is safe and effective for you

## 2019-01-01 NOTE — ED PROVIDER NOTES
H&P Exam - Trauma   Maria D Chang 80 y o  female MRN: [id number]  Unit/Bed#: ED 07/ED 07 Encounter: [id number]    Assessment/Plan   Trauma Alert: Trauma Acuity: B  Model of Arrival:   via    Trauma Team: Current Providers  Attending Provider: Steffen Higgins DO  Physician Assistant: Beverly Jaquez PA-C  Consultants: None    Trauma Active Problems:   Dizziness  Unwitnessed fall    Trauma Plan: CT head; CBC, BMP, troponin, EKG, CXR due to dizziness    Chief Complaint:   Chief Complaint   Patient presents with    Fall     Unwitnessed fall at [facility]  Pt denies LOC  History of Present Illness   HPI:  Salvatore Mccabe is a 80 y o  female w/ hx of CKD, CHF, aortic stenosis and dementia who presents for evaluation after an unwitnessed fall  She is a resident of [facility], was found in her bathroom on the floor, with her wheelchair blocking the doorway  She reports dizziness "because I'm tired" as well as a mild headache but denies other complaints  She is non ambulatory at baseline, is supposed to have staff support her wheelchair to bathroom transfers, however reportedly attempts to get out of her chair frequently  She takes 81mg asa daily, no anticoagulants  She denies vision changes, neck pain, CP, SOB or abd pain  Mechanism:Details of Incident: Unwitnessed fall Injury Date: 12/31/18   Injury Occurence Location - [address] [place] [address] Way: pt in room and had unwitnessed fall    HPI  Review of Systems   Constitutional: Negative for chills, diaphoresis and fever  Eyes: Negative for visual disturbance  Respiratory: Negative for cough and shortness of breath  Cardiovascular: Negative for chest pain and palpitations  Gastrointestinal: Negative for abdominal pain, diarrhea, nausea and vomiting  Genitourinary: Negative for dysuria, flank pain and frequency  Musculoskeletal: Negative for arthralgias and myalgias  Skin: Negative for color change, rash and wound     Allergic/Immunologic: Negative for immunocompromised state  Neurological: Positive for dizziness and headaches  Negative for light-headedness  Hematological: Does not bruise/bleed easily  Psychiatric/Behavioral: Negative for confusion  The patient is not nervous/anxious          Historical Information     Immunizations:   Immunization History   Administered Date(s) Administered     Influenza (IM) Preservative Free 09/14/2012    Influenza 01/05/2005, 11/10/2005, 10/14/2014, 10/27/2015, 10/21/2016, 10/19/2017    Influenza Split High Dose Preservative Free IM 10/27/2015, 10/21/2016, 10/19/2017    Influenza TIV (IM) 10/04/2013, 10/14/2014    Influenza, high dose seasonal 0 5 mL 10/26/2018    Pneumococcal Polysaccharide PPV23 01/01/1993, 11/25/2002    Tdap 06/20/2017       Past Medical History:   Diagnosis Date    Aortic valve stenosis     Breast cancer ([facility] )     last assessed 04/22/2014    CKD (chronic kidney disease)     hypertensive; last assessed 07/19/13    Closed compression fracture of lumbar vertebra ([facility] )     last assessed 07/19/13    Clostridium difficile colitis     last assessed 01/18/13    Costochondritis     Dementia     Depression     Diverticulitis of colon     Generalized osteoarthritis of hand     last assessed 06/18/13    GERD (gastroesophageal reflux disease)     Herpes zoster     last assessed 01/30/17    Hiatal hernia     Hypertension     last assessed 11/20/2013    Jaw disease     Neoplasm of uncertain behavior of skin     last assessed 09/03/2013    Overactive bladder     Urge incontinence of urine     last assessed 04/29/15    UTI (urinary tract infection)        Family History   Problem Relation Age of Onset    Heart disease Mother     Cancer Father     Cancer Sister     Cancer Brother     Breast cancer Maternal Aunt     Rectal cancer Family      Past Surgical History:   Procedure Laterality Date    BREAST LUMPECTOMY Left     BREAST SURGERY      CHOLECYSTECTOMY      HYSTERECTOMY Social History     Social History    Marital status:      Spouse name: N/A    Number of children: N/A    Years of education: N/A     Social History Main Topics    Smoking status: Never Smoker    Smokeless tobacco: Never Used    Alcohol use No    Drug use: No    Sexual activity: Not Asked     Other Topics Concern    None     Social History Narrative    Daily coffee consumption (2 cups/day)       Family History: non-contributory    Meds/Allergies   Prior to Admission Medications   Prescriptions Last Dose Informant Patient Reported? Taking? ALPRAZolam (XANAX) 0 25 mg tablet  Outside Facility (Specify) Yes No   Sig: Take 0 25 mg by mouth daily at bedtime as needed for anxiety   Ascorbic Acid (VITAMIN C) 250 MG CHEW  Outside Facility (Specify) Yes No   Sig: Chew 1 tablet daily   CRANBERRY PO  Outside Facility (Specify) Yes No   Sig: Take 1 tablet by mouth daily  Calcium-Magnesium-Vitamin D (CALCIUM 500 PO)  Outside Facility (Specify) Yes No   Sig: Take by mouth   Docusate Sodium (COLACE CLEAR PO)  Outside Facility (Specify) Yes No   Sig: Take by mouth   Lactobacillus (ACIDOPHILUS/BIFIDUS PO)  Outside Facility (Specify) Yes No   Sig: Take by mouth   Memantine HCl ER 14 MG CP24  Outside Facility (Specify) Yes No   Sig: Take 1 capsule by mouth daily   acetaminophen (TYLENOL) 325 mg tablet  Outside Facility (Specify) Yes No   Sig: Take 325 mg by mouth every 6 (six) hours as needed for mild pain   aspirin (ECOTRIN LOW STRENGTH) 81 mg EC tablet  Outside Facility (Specify) Yes No   Sig: Take 81 mg by mouth every other day   carbidopa-levodopa (SINEMET)  mg per tablet   No No   Sig: TAKE 1 TABLET 3 TIMES A DAY FILL THIS AS A NON DISSOLVING TABLET IF UNAVAILABLE   citalopram (CeleXA) 10 mg tablet  Outside Facility (Specify) No No   Sig: TAKE 1 TABLET BY MOUTH EVERY DAY   famotidine (PEPCID) 10 mg tablet  Outside Facility (Specify) Yes No   Sig: Take 10 mg by mouth daily     ferrous gluconate (FERGON) 324 mg tablet  Outside Facility (Specify) No No   Sig: TAKE 1 TABLET DAILY   furosemide (LASIX) 20 mg tablet   No No   Sig: Take 1 tablet (20 mg total) by mouth daily   ibuprofen (MOTRIN) 200 mg tablet  Outside Facility (Specify) Yes No   Sig: Take 200 mg by mouth every 6 (six) hours as needed for mild pain   risperiDONE (RisperDAL) 0 25 mg tablet   No No   Sig: Take 1 tablet (0 25 mg total) by mouth 2 (two) times a day      Facility-Administered Medications: None       Allergies   Allergen Reactions    Codeine     Percocet  [Oxycodone-Acetaminophen]      Other reaction(s): INTOL  Reaction Date: [id number];     Tramadol     Vicodin  [Hydrocodone-Acetaminophen]      Other reaction(s): INTOL  Reaction Date: [id number];     Wound Dressings Rash and Edema       PHYSICAL EXAM    PE limited by: n/a    Objective   Vitals:   First set: Temperature: 97 6 °F (36 4 °C) (12/31/18 2023)  Pulse: 72 (12/31/18 2020)  Respirations: 16 (12/31/18 2020)  Blood Pressure: 125/69 (12/31/18 2020)  SpO2: 95 % (12/31/18 2020)    Primary Survey:   (A) Airway: Intact, self maintained  (B) Breathing: Regular and unlabored  (C) Circulation: Pulses:   pedal  2/4 and radial  2/4  (D) Disabliity:  Eye Opening:   Spontaneous = 4, Motor Response: Obeys commands = 6 and Verbal Response:  Confused = 4 Oriented to person, place, confused response to time and events  (E) Expose:  Completed    Secondary Survey: (Click on Physical Exam tab above)  Physical Exam   Constitutional: She appears well-developed and well-nourished  No distress  HENT:   Head: Normocephalic and atraumatic  Mouth/Throat: Oropharynx is clear and moist  No oropharyngeal exudate  Eyes: Pupils are equal, round, and reactive to light  No scleral icterus  Neck: Neck supple  No JVD present  Cardiovascular: Normal rate and regular rhythm  Exam reveals no gallop and no friction rub  No murmur heard    Pulses:       Radial pulses are 2+ on the right side, and 2+ on the left side  Dorsalis pedis pulses are 2+ on the right side, and 2+ on the left side  Pulmonary/Chest: No stridor  No respiratory distress  She has no wheezes  She has no rhonchi  She has no rales  Abdominal: She exhibits no distension  There is no tenderness  There is no guarding  Musculoskeletal: She exhibits no edema  Cervical back: She exhibits normal range of motion and no bony tenderness  Thoracic back: She exhibits no bony tenderness  Lumbar back: She exhibits no bony tenderness  Neurological: She is alert  No cranial nerve deficit  Confused to time and events  Follows commands  BUE and BLE strength is symmetric   Skin: Skin is warm and dry  No rash noted  She is not diaphoretic  Psychiatric: She has a normal mood and affect  Her behavior is normal  Judgment and thought content normal    Nursing note and vitals reviewed  Invasive Devices          No matching active lines, drains, or airways          Lab Results:   Results Reviewed     Procedure Component Value Units Date/Time    Troponin I [[id number]]  (Normal) Collected:  12/31/18 2044    Lab Status:  Final result Specimen:  Blood from Arm, Left Updated:  12/31/18 2117     Troponin I <0 02 ng/mL     Basic metabolic panel [[id number]]  (Abnormal) Collected:  12/31/18 2044    Lab Status:  Final result Specimen:  Blood from Arm, Left Updated:  12/31/18 2109     Sodium 139 mmol/L      Potassium 4 8 mmol/L      Chloride 102 mmol/L      CO2 30 mmol/L      ANION GAP 7 mmol/L      BUN 31 (H) mg/dL      Creatinine 1 32 (H) mg/dL      Glucose 114 mg/dL      Calcium 8 6 mg/dL      eGFR 34 ml/min/1 73sq m     Narrative:         National Kidney Disease Education Program recommendations are as follows:  GFR calculation is accurate only with a steady state creatinine  Chronic Kidney disease less than 60 ml/min/1 73 sq  meters  Kidney failure less than 15 ml/min/1 73 sq  meters      CBC and differential [[id number]]  (Abnormal) Collected:  12/31/18 2044    Lab Status:  Final result Specimen:  Blood from Arm, Left Updated:  12/31/18 2055     WBC 8 19 Thousand/uL      RBC 3 73 (L) Million/uL      Hemoglobin 11 9 g/dL      Hematocrit 36 3 %      MCV 97 fL      MCH 31 9 pg      MCHC 32 8 g/dL      RDW 12 5 %      MPV 10 5 fL      Platelets 903 Thousands/uL      nRBC 0 /100 WBCs      Neutrophils Relative 59 %      Immat GRANS % 1 %      Lymphocytes Relative 24 %      Monocytes Relative 11 %      Eosinophils Relative 4 %      Basophils Relative 1 %      Neutrophils Absolute 4 93 Thousands/µL      Immature Grans Absolute 0 04 Thousand/uL      Lymphocytes Absolute 1 95 Thousands/µL      Monocytes Absolute 0 86 Thousand/µL      Eosinophils Absolute 0 36 Thousand/µL      Basophils Absolute 0 05 Thousands/µL                  Imaging Studies:   TRAUMA - CT head wo contrast   Final Result by Jean Marie MD (12/31 2109)      No acute intracranial abnormality  Moderate microangiopathic changes                    Workstation performed: [id number]         XR chest 2 views   ED Interpretation by Florentino Levin PA-C (12/31 2102)   No acute cardiopulmonary disease          Other Studies: n/a    Code Status: Prior  Advance Directive and Living Will:      Power of :    POLST: Yes    Procedures  ECG 12 Lead Documentation  Date/Time: 12/31/2018 8:34 PM  Performed by: Marcus Brooks by: Laina Rodriguez     Indications / Diagnosis:  Dizziness  ECG reviewed by me, the ED Provider: yes    Patient location:  ED  Previous ECG:     Previous ECG:  Unavailable  Rate:     ECG rate:  65    ECG rate assessment: normal    Rhythm:     Rhythm: sinus rhythm    Ectopy:     Ectopy: none    QRS:     QRS axis:  Normal    QRS intervals:  Normal  Conduction:     Conduction: normal    ST segments:     ST segments:  Normal  T waves:     T waves: normal             Phone Contacts  ED Phone Contact     ED Course  ED Course as of Dec 31 2134   Mon Dec 31, 2018   2113 Mild increase from baseline, will give 500cc bolus Creatinine: (!) 1 32 2121 Will plan for discharge and work on transportation    2134 Transportation is available immediately, otherwise no transport available until the AM  Will discontinue her IV hydration, she is capable of drinking PO fluids  Received approx 75mL prior to d/c          MDM  Number of Diagnoses or Management Options  Chronic kidney disease, stage 3 ([facility] ):   Unwitnessed fall: new and requires workup  Diagnosis management comments: 79 yo female presents after unwitnessed fall  Labs show mild elevation of creatinine from baseline, otherwise unremarkable  CT head negative, CXR unremarkable  Remained hemodynamically stable throughout ED stay  Amount and/or Complexity of Data Reviewed  Clinical lab tests: ordered and reviewed  Tests in the radiology section of CPT®: ordered and reviewed  Tests in the medicine section of CPT®: ordered and reviewed  Decide to obtain previous medical records or to obtain history from someone other than the patient: yes  Obtain history from someone other than the patient: yes  Review and summarize past medical records: yes  Independent visualization of images, tracings, or specimens: yes      CritCare Time    Disposition  Final diagnoses:   Unwitnessed fall   Chronic kidney disease, stage 3 ([facility] )     Time reflects when diagnosis was documented in both MDM as applicable and the Disposition within this note     Time User Action Codes Description Comment    12/31/2018  9:21 PM Diana Daniel Add [R29 6] Unwitnessed fall     12/31/2018  9:21 PM Diana Daniel Add [N18 3] Chronic kidney disease, stage 3 [facility])       ED Disposition     ED Disposition Condition Comment    Discharge  [address] discharge to home/self care      Condition at discharge: Stable        Follow-up Information     Follow up With Specialties Details Why Contact Info    Jesica Shen, DO Family Medicine  As needed Ana [address] [place] [id number]  [phone]          Patient's Medications   Discharge Prescriptions    No medications on file     No discharge procedures on file        ED Provider  Electronically Signed by         Alondra Avila PA-C  12/31/18 2127       Alondra Avila PA-C  12/31/18 2134

## 2019-01-01 NOTE — ED NOTES
[facility] is here to transport pt  Pt understands she is being discharged back to [facility]   Stated "I'm glad everything was okay "     Kassidy Anne RN  12/31/18 6035

## 2019-01-02 LAB
ATRIAL RATE: 65 BPM
P AXIS: 70 DEGREES
PR INTERVAL: 230 MS
QRS AXIS: -58 DEGREES
QRSD INTERVAL: 88 MS
QT INTERVAL: 440 MS
QTC INTERVAL: 457 MS
T WAVE AXIS: 27 DEGREES
VENTRICULAR RATE: 65 BPM

## 2019-01-02 PROCEDURE — 93010 ELECTROCARDIOGRAM REPORT: CPT | Performed by: INTERNAL MEDICINE

## 2019-01-03 DIAGNOSIS — G20 PARKINSON DISEASE (HCC): ICD-10-CM

## 2019-01-23 ENCOUNTER — TELEPHONE (OUTPATIENT)
Dept: FAMILY MEDICINE CLINIC | Facility: CLINIC | Age: 84
End: 2019-01-23

## 2019-01-23 DIAGNOSIS — N30.00 ACUTE CYSTITIS WITHOUT HEMATURIA: Primary | ICD-10-CM

## 2019-01-23 RX ORDER — CIPROFLOXACIN 250 MG/1
250 TABLET, FILM COATED ORAL EVERY 12 HOURS SCHEDULED
Qty: 14 TABLET | Refills: 0 | Status: SHIPPED | OUTPATIENT
Start: 2019-01-23 | End: 2019-01-30

## 2019-01-23 NOTE — TELEPHONE ENCOUNTER
Yes, I would like her to start cipro 250 mg 1 tab twice a day for 7 days   She is at [facility]  Do I need to send a prescription or do they just take verbal order, not sure how that works

## 2019-01-23 NOTE — TELEPHONE ENCOUNTER
Tahir called from [address] and would like to know based off of pt UA done on 1/17/19, would you like to start pt on abx, UA results are being faxed over    Tahir can be reached at [phone]

## 2019-02-05 ENCOUNTER — TELEPHONE (OUTPATIENT)
Dept: FAMILY MEDICINE CLINIC | Facility: CLINIC | Age: 84
End: 2019-02-05

## 2019-02-05 DIAGNOSIS — F03.90 DEMENTIA WITHOUT BEHAVIORAL DISTURBANCE, UNSPECIFIED DEMENTIA TYPE (HCC): Primary | ICD-10-CM

## 2019-02-05 NOTE — TELEPHONE ENCOUNTER
Patient daughter is asking if you could please order palliative care for her mother as she states that her mother has been very uncomfortable and sad and was receiving palliative care through [facility] when she was living at home   Please advise

## 2019-02-16 DIAGNOSIS — G20 PARKINSON DISEASE (HCC): ICD-10-CM

## 2019-02-27 DIAGNOSIS — D50.9 IRON DEFICIENCY ANEMIA, UNSPECIFIED IRON DEFICIENCY ANEMIA TYPE: ICD-10-CM

## 2019-02-27 RX ORDER — DOXYCYCLINE HYCLATE 50 MG/1
CAPSULE, GELATIN COATED ORAL
Qty: 30 TABLET | Refills: 5 | Status: SHIPPED | OUTPATIENT
Start: 2019-02-27 | End: 2019-09-16 | Stop reason: SDUPTHER

## 2019-03-02 ENCOUNTER — TELEPHONE (OUTPATIENT)
Dept: OTHER | Facility: OTHER | Age: 84
End: 2019-03-02

## 2019-03-05 ENCOUNTER — TELEPHONE (OUTPATIENT)
Dept: FAMILY MEDICINE CLINIC | Facility: CLINIC | Age: 84
End: 2019-03-05

## 2019-03-25 DIAGNOSIS — I50.32 CHRONIC DIASTOLIC CHF (CONGESTIVE HEART FAILURE) (HCC): Chronic | ICD-10-CM

## 2019-03-25 DIAGNOSIS — F33.41 RECURRENT MAJOR DEPRESSIVE DISORDER, IN PARTIAL REMISSION (HCC): ICD-10-CM

## 2019-03-25 RX ORDER — CITALOPRAM 10 MG/1
TABLET ORAL
Qty: 30 TABLET | Refills: 5 | Status: SHIPPED | OUTPATIENT
Start: 2019-03-25 | End: 2019-08-05 | Stop reason: SDUPTHER

## 2019-03-25 RX ORDER — FUROSEMIDE 20 MG/1
TABLET ORAL
Qty: 30 TABLET | Refills: 2 | Status: SHIPPED | OUTPATIENT
Start: 2019-03-25 | End: 2019-06-18 | Stop reason: SDUPTHER

## 2019-03-28 ENCOUNTER — OFFICE VISIT (OUTPATIENT)
Dept: FAMILY MEDICINE CLINIC | Facility: CLINIC | Age: 84
End: 2019-03-28
Payer: MEDICARE

## 2019-03-28 VITALS
HEIGHT: 60 IN | BODY MASS INDEX: 26.5 KG/M2 | OXYGEN SATURATION: 95 % | HEART RATE: 77 BPM | DIASTOLIC BLOOD PRESSURE: 66 MMHG | WEIGHT: 135 LBS | TEMPERATURE: 95.8 F | SYSTOLIC BLOOD PRESSURE: 120 MMHG

## 2019-03-28 DIAGNOSIS — R29.898 MUSCULAR DECONDITIONING: ICD-10-CM

## 2019-03-28 DIAGNOSIS — I35.0 AORTIC STENOSIS, SEVERE: ICD-10-CM
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
## 2019-03-28 DIAGNOSIS — F02.81 LATE ONSET ALZHEIMER'S DISEASE WITH BEHAVIORAL DISTURBANCE (HCC): Primary | ICD-10-CM

## 2019-03-28 DIAGNOSIS — G30.1 LATE ONSET ALZHEIMER'S DISEASE WITH BEHAVIORAL DISTURBANCE (HCC): Primary | ICD-10-CM

## 2019-03-28 DIAGNOSIS — R26.9 GAIT DISTURBANCE: ICD-10-CM

## 2019-03-28 DIAGNOSIS — R54 FRAILTY: ICD-10-CM

## 2019-03-28 PROBLEM — S80.812A: Status: RESOLVED | Noted: 2018-11-27 | Resolved: 2019-03-28

## 2019-03-28 PROCEDURE — 99214 OFFICE O/P EST MOD 30 MIN: CPT | Performed by: FAMILY MEDICINE

## 2019-03-28 RX ORDER — MEMANTINE HYDROCHLORIDE 28 MG/1
28 CAPSULE, EXTENDED RELEASE ORAL DAILY
Qty: 90 CAPSULE | Refills: 3 | Status: SHIPPED | OUTPATIENT
Start: 2019-03-28 | End: 2020-03-26

## 2019-03-28 NOTE — PATIENT INSTRUCTIONS
Pt will need home nursing care for off loading strategies and assess disease management  Pt will also need physical therapy for gait training, transfer and caregiver training  Pt will need occupational therapy for adls and safety in bathroom   Increased namenda to 28mg daily

## 2019-03-28 NOTE — PROGRESS NOTES
Subjective:   Chief Complaint   Patient presents with   Jamilah      pt is here today for [facility] papers to be filled out  Pt is due for AWV and BMI: Followup Plan  Pt had blood work complete 12/13 and 12/31  Patient ID: Juan Rincon is a 80 y o  female  Pt here with granddaughter today  Seen by neurologist, Dr Lisa Castle, and increased namenda to 28mg daily and still on sinemet  Pt needs occupation therapy and physical therapy  She has trouble ambulating  Pt has good appetite  bms are good  The following portions of the patient's history were reviewed and updated as appropriate: allergies, current medications, past family history, past medical history, past social history, past surgical history and problem list     Review of Systems   Constitutional: Positive for fatigue  Negative for fever  HENT: Negative  Eyes: Negative  Respiratory: Negative  Negative for cough  Cardiovascular: Negative  Gastrointestinal: Negative  Endocrine: Negative  Genitourinary: Negative  Musculoskeletal: Positive for gait problem  Generalized weakness   Skin: Negative  Allergic/Immunologic: Negative  Neurological: Positive for tremors and weakness  Psychiatric/Behavioral: Positive for decreased concentration  Objective:  Vitals:    03/28/19 1003   BP: 120/66   Pulse: 77   Temp: (!) 95 8 °F (35 4 °C)   SpO2: 95%   Weight: 61 2 kg (135 lb)   Height: 5' (1 524 m)      Physical Exam   Constitutional: She is oriented to person, place, and time  She appears well-developed and well-nourished  HENT:   Head: Normocephalic and atraumatic  Cardiovascular: Normal rate, regular rhythm and normal heart sounds  Pulmonary/Chest: Effort normal and breath sounds normal    Abdominal: Soft  Bowel sounds are normal    Neurological: She is alert and oriented to person, place, and time  Skin: Skin is warm and dry  Psychiatric: She has a normal mood and affect   Her behavior is normal  Judgment and thought content normal    Nursing note and vitals reviewed  Assessment/Plan:    No problem-specific Assessment & Plan notes found for this encounter  Diagnoses and all orders for this visit:    Late onset Alzheimer's disease with behavioral disturbance  Comments:  new rx for namenda as recommended by neurologist 28mg increased dose  Orders:  -     Memantine HCl ER 28 MG CP24;  Take 1 capsule by mouth daily    Aortic stenosis, severe  Comments:  stable, not good candidate for surgery    Gait disturbance  Comments:  physical therapy and occupational therapy     Frailty  Comments:  physical therapy and occupational therapy     Muscular deconditioning    Fall, sequela

## 2019-04-02 ENCOUNTER — TELEPHONE (OUTPATIENT)
Dept: FAMILY MEDICINE CLINIC | Facility: CLINIC | Age: 84
End: 2019-04-02

## 2019-06-11 ENCOUNTER — TELEPHONE (OUTPATIENT)
Dept: FAMILY MEDICINE CLINIC | Facility: CLINIC | Age: 84
End: 2019-06-11

## 2019-06-11 DIAGNOSIS — J40 BRONCHITIS: Primary | ICD-10-CM

## 2019-06-11 RX ORDER — CEFUROXIME AXETIL 250 MG/1
250 TABLET ORAL EVERY 12 HOURS SCHEDULED
Qty: 14 TABLET | Refills: 0 | Status: SHIPPED | OUTPATIENT
Start: 2019-06-11 | End: 2019-06-18

## 2019-06-18 DIAGNOSIS — I50.32 CHRONIC DIASTOLIC CHF (CONGESTIVE HEART FAILURE) (HCC): Chronic | ICD-10-CM

## 2019-06-18 RX ORDER — FUROSEMIDE 20 MG/1
TABLET ORAL
Qty: 30 TABLET | Refills: 2 | Status: SHIPPED | OUTPATIENT
Start: 2019-06-18 | End: 2019-08-05 | Stop reason: SDUPTHER

## 2019-07-01 ENCOUNTER — TELEPHONE (OUTPATIENT)
Dept: FAMILY MEDICINE CLINIC | Facility: CLINIC | Age: 84
End: 2019-07-01

## 2019-07-01 DIAGNOSIS — J20.9 ACUTE BRONCHITIS, UNSPECIFIED ORGANISM: Primary | ICD-10-CM

## 2019-07-01 RX ORDER — AZITHROMYCIN 250 MG/1
TABLET, FILM COATED ORAL
Qty: 6 TABLET | Refills: 0 | Status: SHIPPED | OUTPATIENT
Start: 2019-07-01 | End: 2019-07-05

## 2019-07-01 NOTE — TELEPHONE ENCOUNTER
[address] (patient lives there) called stating that patient has had a productive cough for the past 10 days and are asking if you could please send an antibiotic into [facility] services in [facility]  Please advise, thanks

## 2019-08-05 ENCOUNTER — OFFICE VISIT (OUTPATIENT)
Dept: FAMILY MEDICINE CLINIC | Facility: CLINIC | Age: 84
End: 2019-08-05
Payer: MEDICARE

## 2019-08-05 VITALS
HEIGHT: 60 IN | SYSTOLIC BLOOD PRESSURE: 116 MMHG | DIASTOLIC BLOOD PRESSURE: 60 MMHG | RESPIRATION RATE: 16 BRPM | TEMPERATURE: 97.7 F | OXYGEN SATURATION: 96 % | HEART RATE: 95 BPM | BODY MASS INDEX: 26.37 KG/M2

## 2019-08-05 DIAGNOSIS — I50.32 CHRONIC DIASTOLIC CHF (CONGESTIVE HEART FAILURE) (HCC): Chronic | ICD-10-CM

## 2019-08-05 DIAGNOSIS — Z23 NEED FOR VACCINATION: ICD-10-CM

## 2019-08-05 DIAGNOSIS — C50.919 MALIGNANT NEOPLASM OF FEMALE BREAST, UNSPECIFIED ESTROGEN RECEPTOR STATUS, UNSPECIFIED LATERALITY, UNSPECIFIED SITE OF BREAST (HCC): ICD-10-CM

## 2019-08-05 DIAGNOSIS — Z00.00 MEDICARE ANNUAL WELLNESS VISIT, SUBSEQUENT: Primary | ICD-10-CM

## 2019-08-05 DIAGNOSIS — F33.41 RECURRENT MAJOR DEPRESSIVE DISORDER, IN PARTIAL REMISSION (HCC): ICD-10-CM

## 2019-08-05 DIAGNOSIS — G20 PARKINSON DISEASE (HCC): ICD-10-CM

## 2019-08-05 DIAGNOSIS — H61.23 BILATERAL HEARING LOSS DUE TO CERUMEN IMPACTION: ICD-10-CM

## 2019-08-05 DIAGNOSIS — N18.30 CHRONIC KIDNEY DISEASE, STAGE 3 (HCC): Chronic | ICD-10-CM

## 2019-08-05 PROCEDURE — 90670 PCV13 VACCINE IM: CPT

## 2019-08-05 PROCEDURE — 69210 REMOVE IMPACTED EAR WAX UNI: CPT | Performed by: FAMILY MEDICINE

## 2019-08-05 PROCEDURE — 99213 OFFICE O/P EST LOW 20 MIN: CPT | Performed by: FAMILY MEDICINE

## 2019-08-05 PROCEDURE — G0439 PPPS, SUBSEQ VISIT: HCPCS | Performed by: FAMILY MEDICINE

## 2019-08-05 PROCEDURE — G0009 ADMIN PNEUMOCOCCAL VACCINE: HCPCS

## 2019-08-05 RX ORDER — FUROSEMIDE 20 MG/1
20 TABLET ORAL DAILY
Qty: 90 TABLET | Refills: 3 | Status: SHIPPED | OUTPATIENT
Start: 2019-08-05 | End: 2020-07-27 | Stop reason: SDUPTHER

## 2019-08-05 RX ORDER — CITALOPRAM 10 MG/1
10 TABLET ORAL DAILY
Qty: 90 TABLET | Refills: 3 | Status: SHIPPED | OUTPATIENT
Start: 2019-08-05 | End: 2020-07-27 | Stop reason: SDUPTHER

## 2019-08-05 NOTE — PATIENT INSTRUCTIONS
Urinary Incontinence   WHAT YOU NEED TO KNOW:   What is urinary incontinence? Urinary incontinence (UI) is when you lose control of your bladder  What causes UI? UI occurs because your bladder cannot store or empty urine properly  The following are the most common types of UI:  · Stress incontinence  is when you leak urine due to increased bladder pressure  This may happen when you cough, sneeze, or exercise  · Urge incontinence  is when you feel the need to urinate right away and leak urine accidentally  · Mixed incontinence  is when you have both stress and urge UI  What are the signs and symptoms of UI?   · You feel like your bladder does not empty completely when you urinate  · You urinate often and need to urinate immediately  · You leak urine when you sleep, or you wake up with the urge to urinate  · You leak urine when you cough, sneeze, exercise, or laugh  How is UI diagnosed? Your healthcare provider will ask how often you leak urine and whether you have stress or urge symptoms  Tell him which medicines you take, how often you urinate, and how much liquid you drink each day  You may need any of the following tests:  · Urine tests  may show infection or kidney function  · A pelvic exam  may be done to check for blockages  A pelvic exam will also show if your bladder, uterus, or other organs have moved out of place  · An x-ray, ultrasound, or CT  may show problems with parts of your urinary system  You may be given contrast liquid to help your organs show up better in the pictures  Tell the healthcare provider if you have ever had an allergic reaction to contrast liquid  Do not enter the MRI room with anything metal  Metal can cause serious injury  Tell the healthcare provider if you have any metal in or on your body  · A bladder scan  will show how much urine is left in your bladder after you urinate   You will be asked to urinate and then healthcare providers will use a small ultrasound machine to check the urine left in your bladder  · Cystometry  is used to check the function of your urinary system  Your healthcare provider checks the pressure in your bladder while filling it with fluid  Your bladder pressure may also be tested when your bladder is full and while you urinate  How is UI treated? · Medicines  can help strengthen your bladder control  · Electrical stimulation  is used to send a small amount of electrical energy to your pelvic floor muscles  This helps control your bladder function  Electrodes may be placed outside your body or in your rectum  For women, the electrodes may be placed in the vagina  · A bulking agent  may be injected into the wall of your urethra to make it thicker  This helps keep your urethra closed and decreases urine leakage  · Devices  such as a clamp, pessary, or tampon may help stop urine leaks  Ask your healthcare provider for more information about these and other devices  · Surgery  may be needed if other treatments do not work  Several types of surgery can help improve your bladder control  Ask your healthcare provider for more information about the surgery you may need  How can I manage my symptoms? · Do pelvic muscle exercises often  Your pelvic muscles help you stop urinating  Squeeze these muscles tight for 5 seconds, then relax for 5 seconds  Gradually work up to squeezing for 10 seconds  Do 3 sets of 15 repetitions a day, or as directed  This will help strengthen your pelvic muscles and improve bladder control  · A catheter  may be used to help empty your bladder  A catheter is a tiny, plastic tube that is put into your bladder to drain your urine  Your healthcare provider may tell you to use a catheter to prevent your bladder from getting too full and leaking urine  · Keep a UI record  Write down how often you leak urine and how much you leak   Make a note of what you were doing when you leaked urine     · Train your bladder  Go to the bathroom at set times, such as every 2 hours, even if you do not feel the urge to go  You can also try to hold your urine when you feel the urge to go  For example, hold your urine for 5 minutes when you feel the urge to go  As that becomes easier, hold your urine for 10 minutes  · Drink liquids as directed  Ask your healthcare provider how much liquid to drink each day and which liquids are best for you  You may need to limit the amount of liquid you drink to help control your urine leakage  Limit or do not have drinks that contain caffeine or alcohol  Do not drink any liquid right before you go to bed  · Prevent constipation  Eat a variety of high-fiber foods  Good examples are high-fiber cereals, beans, vegetables, and whole-grain breads  Prune juice may help make your bowel movement softer  Walking is the best way to trigger your intestines to have a bowel movement  · Exercise regularly and maintain a healthy weight  Ask your healthcare provider how much you should weigh and about the best exercise plan for you  Weight loss and exercise will decrease pressure on your bladder and help you control your leakage  Ask him to help you create a weight loss plan if you are overweight  When should I seek immediate care? · You have severe pain  · You are confused or cannot think clearly  When should I contact my healthcare provider? · You have a fever  · You see blood in your urine  · You have pain when you urinate  · You have new or worse pain, even after treatment  · Your mouth feels dry or you have vision changes  · Your urine is cloudy or smells bad  · You have questions or concerns about your condition or care  CARE AGREEMENT:   You have the right to help plan your care  Learn about your health condition and how it may be treated  Discuss treatment options with your caregivers to decide what care you want to receive   You always have the right to refuse treatment  The above information is an  only  It is not intended as medical advice for individual conditions or treatments  Talk to your doctor, nurse or pharmacist before following any medical regimen to see if it is safe and effective for you  © 2017 [address] Information is for End User's use only and may not be sold, redistributed or otherwise used for commercial purposes  All illustrations and images included in CareNotes® are the copyrighted property of [employer]  or Deacon Pearson  Cigarette Smoking and Your Health   AMBULATORY CARE:   Risks to your health if you smoke:  Nicotine and other chemicals found in tobacco damage every cell in your body  Even if you are a light smoker, you have an increased risk for cancer, heart disease, and lung disease  If you are pregnant or have diabetes, smoking increases your risk for complications  Benefits to your health if you stop smoking:   · You decrease respiratory symptoms such as coughing, wheezing, and shortness of breath  · You reduce your risk for cancers of the lung, mouth, throat, kidney, bladder, pancreas, stomach, and cervix  If you already have cancer, you increase the benefits of chemotherapy  You also reduce your risk for cancer returning or a second cancer from developing  · You reduce your risk for heart disease, blood clots, heart attack, and stroke  · You reduce your risk for lung infections, and diseases such as pneumonia, asthma, chronic bronchitis, and emphysema  · Your circulation improves  More oxygen can be delivered to your body  If you have diabetes, you lower your risk for complications, such as kidney, artery, and eye diseases  You also lower your risk for nerve damage  Nerve damage can lead to amputations, poor vision, and blindness  · You improve your body's ability to heal and to fight infections    Benefits to the health of others if you stop smoking:  Tobacco is harmful to nonsmokers who breathe in your secondhand smoke  The following are ways the health of others around you may improve when you stop smoking:  · You lower the risks for lung cancer and heart disease in nonsmoking adults  · If you are pregnant, you lower the risk for miscarriage, early delivery, low birth weight, and stillbirth  You also lower your baby's risk for SIDS, obesity, developmental delay, and neurobehavioral problems, such as ADHD  · If you have children, you lower their risk for ear infections, colds, pneumonia, bronchitis, and asthma  For more information and support to stop smoking:   · [employer]  gov  Phone: 0- 555 - 541-0815  Web Address: www [employer]  Follow up with your healthcare provider as directed:  Write down your questions so you remember to ask them during your visits  © 2017 [address] Information is for End User's use only and may not be sold, redistributed or otherwise used for commercial purposes  All illustrations and images included in CareNotes® are the copyrighted property of A D A M [employer]  or Deacon Pearson  The above information is an  only  It is not intended as medical advice for individual conditions or treatments  Talk to your doctor, nurse or pharmacist before following any medical regimen to see if it is safe and effective for you  Fall Prevention   AMBULATORY CARE:   Fall prevention  includes ways to make your home and other areas safer  It also includes ways you can move more carefully to prevent a fall  Health conditions that cause changes in your blood pressure, vision, or muscle strength and coordination may increase your risk for falls  Medicines may also increase your risk for falls if they make you dizzy, weak, or sleepy  Call 911 or have someone else call if:   · You have fallen and are unconscious  · You have fallen and cannot move part of your body    Contact your healthcare provider if:   · You have fallen and have pain or a headache  · You have questions or concerns about your condition or care  Fall prevention tips:   · Stand or sit up slowly  This may help you keep your balance and prevent falls  · Use assistive devices as directed  Your healthcare provider may suggest that you use a cane or walker to help you keep your balance  You may need to have grab bars put in your bathroom near the toilet or in the shower  · Wear shoes that fit well and have soles that   Wear shoes both inside and outside  Use slippers with good   Do not wear shoes with high heels  · Wear a personal alarm  This is a device that allows you to call 911 if you fall and need help  Ask your healthcare provider for more information  · Stay active  Exercise can help strengthen your muscles and improve your balance  Your healthcare provider may recommend water aerobics or walking  He or she may also recommend physical therapy to improve your coordination  Never start an exercise program without talking to your healthcare provider first      · Manage your medical conditions  Keep all appointments with your healthcare providers  Visit your eye doctor as directed  Home safety tips:   · Add items to prevent falls in the bathroom  Put nonslip strips on your bath or shower floor to prevent you from slipping  Use a bath mat if you do not have carpet in the bathroom  This will prevent you from falling when you step out of the bath or shower  Use a shower seat so you do not need to stand while you shower  Sit on the toilet or a chair in your bathroom to dry yourself and put on clothing  This will prevent you from losing your balance from drying or dressing yourself while you are standing  · Keep paths clear  Remove books, shoes, and other objects from walkways and stairs  Place cords for telephones and lamps out of the way so that you do not need to walk over them   Tape them down if you cannot move them  Remove small rugs  If you cannot remove a rug, secure it with double-sided tape  This will prevent you from tripping  · Install bright lights in your home  Use night lights to help light paths to the bathroom or kitchen  Always turn on the light before you start walking  · Keep items you use often on shelves within reach  Do not use a step stool to help you reach an item  · Paint or place reflective tape on the edges of your stairs  This will help you see the stairs better  Follow up with your healthcare provider as directed:  Write down your questions so you remember to ask them during your visits  © 2017 [address] Information is for End User's use only and may not be sold, redistributed or otherwise used for commercial purposes  All illustrations and images included in CareNotes® are the copyrighted property of A D A M , Inc  or Deacon Pearson  The above information is an  only  It is not intended as medical advice for individual conditions or treatments  Talk to your doctor, nurse or pharmacist before following any medical regimen to see if it is safe and effective for you  Advance Directives   WHAT YOU NEED TO KNOW:   What are advance directives? Advance directives are legal documents that state your wishes and plans for medical care  These plans are made ahead of time in case you lose your ability to make decisions for yourself  Advance directives can apply to any medical decision, such as the treatments you want, and if you want to donate organs  What are the types of advance directives? There are many types of advance directives, and each state has rules about how to use them  You may choose a combination of any of the following:  · Living will: This is a written record of the treatment you want  You can also choose which treatments you do not want, which to limit, and which to stop at a certain time   This includes surgery, medicine, IV fluid, and tube feedings  · Durable power of  for healthcare [facility]): This is a written record that states who you want to make healthcare choices for you when you are unable to make them for yourself  This person, called a proxy, is usually a family member or a friend  You may choose more than 1 proxy  · Do not resuscitate (DNR) order:  A DNR order is used in case your heart stops beating or you stop breathing  It is a request not to have certain forms of treatment, such as CPR  A DNR order may be included in other types of advance directives  · Medical directive: This covers the care that you want if you are in a coma, near death, or unable to make decisions for yourself  You can list the treatments you want for each condition  Treatment may include pain medicine, surgery, blood transfusions, dialysis, IV or tube feedings, and a ventilator (breathing machine)  · Values history: This document has questions about your views, beliefs, and how you feel and think about life  This information can help others choose the care that you would choose  Why are advance directives important? An advance directive helps you control your care  Although spoken wishes may be used, it is better to have your wishes written down  Spoken wishes can be misunderstood, or not followed  Treatments may be given even if you do not want them  An advance directive may make it easier for your family to make difficult choices about your care  How do I decide what to put in my advance directives? · Make informed decisions:  Make sure you fully understand treatments or care you may receive  Think about the benefits and problems your decisions could cause for you or your family  Talk to healthcare providers if you have concerns or questions before you write down your wishes  You may also want to talk with your [employer] or , or a    Check your state laws to make sure that what you put in your advance directive is legal      · Sign all forms:  Sign and date your advance directive when you have finished  You may also need 2 witnesses to sign the forms  Witnesses cannot be your doctor or his staff, your spouse, heirs or beneficiaries, people you owe money to, or your chosen proxy  Talk to your family, proxy, and healthcare providers about your advance directive  Give each person a copy, and keep one for yourself in a place you can get to easily  Do not keep it hidden or locked away  · Review and revise your plans: You can revise your advance directive at any time, as long as you are able to make decisions  Review your plan every year, and when there are changes in your life, or your health  When you make changes, let your family, proxy, and healthcare providers know  Give each a new copy  Where can I find more information? · [employer]  [address] , [address]  Phone: 2- 277 - [phone]  Phone: 8- 594 - [phone]  Web Address: http://www  aafp org  · [address] [facility])  [address] [facility], [address] [facility] , [address] [place] [address]  Phone: [phone]  Phone: [phone]  Web Address: Rach marcos  CARE AGREEMENT:   You have the right to help plan your care  To help with this plan, you must learn about your health condition and treatment options  You must also learn about advance directives and how they are used  Work with your healthcare providers to decide what care will be used to treat you  You always have the right to refuse treatment  The above information is an  only  It is not intended as medical advice for individual conditions or treatments  Talk to your doctor, nurse or pharmacist before following any medical regimen to see if it is safe and effective for you    © 2017 [address] Information is for End User's use only and may not be sold, redistributed or otherwise used for commercial purposes  All illustrations and images included in CareNotes® are the copyrighted property of A D A M , Inc  or Deacon Pearson

## 2019-08-05 NOTE — PROGRESS NOTES
Assessment and Plan:     Problem List Items Addressed This Visit        Cardiovascular and Mediastinum    Chronic diastolic CHF (congestive heart failure) (HCC) (Chronic)    Relevant Medications    furosemide (LASIX) 20 mg tablet    Other Relevant Orders    Lipid Panel with Direct LDL reflex       Nervous and Auditory    Parkinson disease (HCC)    Relevant Medications    carbidopa-levodopa (SINEMET)  mg per tablet       Genitourinary    Chronic kidney disease, stage 3 (HCC) (Chronic)    Relevant Medications    furosemide (LASIX) 20 mg tablet    Other Relevant Orders    Basic metabolic panel       Other    Depression    Relevant Medications    citalopram (CeleXA) 10 mg tablet    Malignant neoplasm of breast ([facility] )    Medicare annual wellness visit, subsequent - Primary    Need for vaccination    Relevant Orders    PNEUMOCOCCAL CONJUGATE VACCINE 13-VALENT GREATER THAN 6 MONTHS (Completed)         History of Present Illness:     Patient presents for Medicare Annual Wellness visit    Patient Care Team:  Erick Huang DO as PCP - General (Family Medicine)  MD Myke Rock MD     Problem List:     Patient Active Problem List   Diagnosis    Aortic stenosis, severe    Basal cell carcinoma of skin of nose    Basal cell carcinoma of scalp and skin of neck    Chronic low back pain    Constipation    Frailty    Dementia    Depression    Dyslipidemia    Eczema    Falls    Gait disturbance    Gastroesophageal reflux disease    Iron deficiency anemia secondary to inadequate dietary iron intake    Malignant neoplasm of breast ([facility] )    Muscular deconditioning    Overactive bladder    SOB (shortness of breath)    Fungal dermatitis    Chronic kidney disease, stage 3 (HCC)    Pedal edema    Chronic diastolic CHF (congestive heart failure) ([facility] )    Medicare annual wellness visit, subsequent    Need for vaccination    Parkinson disease [facility])      Past Medical and Surgical History:     Past Medical History:   Diagnosis Date    Aortic valve stenosis     Breast cancer ([facility] )     last assessed 04/22/2014    CKD (chronic kidney disease)     hypertensive; last assessed 07/19/13    Closed compression fracture of lumbar vertebra ([facility] )     last assessed 07/19/13    Clostridium difficile colitis     last assessed 01/18/13    Costochondritis     Dementia     Depression     Diverticulitis of colon     Generalized osteoarthritis of hand     last assessed 06/18/13    GERD (gastroesophageal reflux disease)     Herpes zoster     last assessed 01/30/17    Hiatal hernia     Hypertension     last assessed 11/20/2013    Jaw disease     Neoplasm of uncertain behavior of skin     last assessed 09/03/2013    Overactive bladder     Urge incontinence of urine     last assessed 04/29/15    UTI (urinary tract infection)      Past Surgical History:   Procedure Laterality Date    BREAST LUMPECTOMY Left     BREAST SURGERY      CHOLECYSTECTOMY      HYSTERECTOMY        Family History:     Family History   Problem Relation Age of Onset    Heart disease Mother     Cancer Father     Cancer Sister     Cancer Brother     Breast cancer Maternal Aunt     Rectal cancer Family       Social History:     Social History     Tobacco Use   Smoking Status Never Smoker   Smokeless Tobacco Never Used     Social History     Substance and Sexual Activity   Alcohol Use No     Social History     Substance and Sexual Activity   Drug Use No      Medications and Allergies:     Current Outpatient Medications   Medication Sig Dispense Refill    acetaminophen (TYLENOL) 325 mg tablet Take 325 mg by mouth every 6 (six) hours as needed for mild pain      ALPRAZolam (XANAX) 0 25 mg tablet Take 0 25 mg by mouth daily at bedtime as needed for anxiety      Ascorbic Acid (VITAMIN C) 250 MG CHEW Chew 1 tablet daily      aspirin (ECOTRIN LOW STRENGTH) 81 mg EC tablet Take 81 mg by mouth every other day      carbidopa-levodopa (SINEMET)  mg per tablet Take 1 tablet by mouth 3 (three) times a day 270 tablet 3    citalopram (CeleXA) 10 mg tablet Take 1 tablet (10 mg total) by mouth daily 90 tablet 3    CRANBERRY PO Take 1 tablet by mouth daily   Docusate Sodium (COLACE CLEAR PO) Take by mouth      famotidine (PEPCID) 10 mg tablet Take 10 mg by mouth daily   ferrous gluconate (FERGON) 324 mg tablet TAKE 1 TABLET DAILY 30 tablet 5    furosemide (LASIX) 20 mg tablet Take 1 tablet (20 mg total) by mouth daily 90 tablet 3    ibuprofen (MOTRIN) 200 mg tablet Take 200 mg by mouth every 6 (six) hours as needed for mild pain      Lactobacillus (ACIDOPHILUS/BIFIDUS PO) Take by mouth      Memantine HCl ER 28 MG CP24 Take 1 capsule by mouth daily 90 capsule 3    Calcium-Magnesium-Vitamin D (CALCIUM 500 PO) Take by mouth       No current facility-administered medications for this visit  Allergies   Allergen Reactions    Codeine     Percocet  [Oxycodone-Acetaminophen]      Other reaction(s): INTOL  Reaction Date: [id number];     Tramadol     Vicodin  [Hydrocodone-Acetaminophen]      Other reaction(s): INTOL  Reaction Date: [id number];     Wound Dressings Rash and Edema      Immunizations:     Immunization History   Administered Date(s) Administered     Influenza (IM) Preservative Free 09/14/2012    INFLUENZA 01/05/2005, 11/10/2005, 10/14/2014, 10/27/2015, 10/21/2016, 10/19/2017    Influenza Split High Dose Preservative Free IM 10/27/2015, 10/21/2016, 10/19/2017    Influenza TIV (IM) 10/04/2013, 10/14/2014    Influenza, high dose seasonal 0 5 mL 10/26/2018    Pneumococcal Conjugate 13-Valent 08/05/2019    Pneumococcal Polysaccharide PPV23 01/01/1993, 11/25/2002    Tdap 06/20/2017      Medicare Screening Tests and Risk Assessments: Shaquille Ryan is here for her Subsequent Wellness visit  Last Medicare Wellness visit information reviewed, patient interviewed and updates made to the record today       Health Risk Assessment:  Patient rates overall health as very good  Patient feels that their physical health rating is Same  Eyesight was rated as Same  Hearing was rated as Same  Patient feels that their emotional and mental health rating is Same  Pain experienced by patient in the last 7 days has been None  Emotional/Mental Health:  Patient has been feeling nervous/anxious  PHQ-9 Depression Screening:    Frequency of the following problems over the past two weeks:      1  Little interest or pleasure in doing things: 0 - not at all      2  Feeling down, depressed, or hopeless: 1 - several days  PHQ-2 Score: 1          Broken Bones/Falls: Fall Risk Assessment:    In the past year, patient has experienced: No history of falling in past year          Bladder/Bowel:  Patient has not leaked urine accidently in the last six months  Patient reports no loss of bowel control  Immunizations:  Patient has had a flu vaccination within the last year  Patient has received a pneumonia shot  Patient has not received a shingles shot  Patient has not received tetanus/diphtheria shot  Home Safety:  Patient has trouble with stairs inside or outside of their home  Patient currently reports that there are no safety hazards present in home, working smoke alarms,     Preventative Screenings:   Breast cancer screening performed, colon cancer screen completed, cholesterol screen completed, glaucoma eye exam completed, (Additional Comments: Very unsure of answers)    Nutrition:  Current diet: Regular with servings of the following:    Medications:  Patient is not currently taking any over-the-counter supplements  Patient is not able to manage medications  Lifestyle Choices:  Patient reports no tobacco use  Patient has not smoked or used tobacco in the past   Patient reports no alcohol use  Patient does not drive a vehicle  Patient wears seat belt          Activities of Daily Living:  Can get out of bed by his or her self, able to dress self, unable to make own meals, unable to do own shopping, unable to bathe self, unable to do laundry/housekeeping, unable to manage own money and other related tasks    Previous Hospitalizations: Additional Comments: Patient unsure    Advanced Directives:  Patient has decided on a power of   Patient has spoken to designated power of   Patient has completed advanced directive  Additional Comments: Patient unsure of third question    Preventative Screening/Counseling:      Cardiovascular:     Due for Labs/Analytes/Optional EKG: Lipid Panel          Diabetes:      General: Screening Current          Colorectal Cancer:      General: Screening Not Indicated          Breast Cancer:      General: Patient Declines          Cervical Cancer:      General: Screening Not Indicated          Osteoporosis:      General: Patient Declines          AAA:      General: Screening Not Indicated          Glaucoma:      General: Patient Declines          HIV:      General: Screening Not Indicated          Hepatitis C:      General: Screening Not Indicated        Advanced Directives:   Patient has living will for healthcare, has durable POA for healthcare, patient has an advanced directive  Information on ACP and/or AD provided  5 wishes given  End of life assessment reviewed with patient  Provider agrees with end of life decisions        Immunizations:      Influenza: Influenza UTD This Year and Influenza Recommended Annually      Pneumococcal: Pneumococcal Due Today      Shingrix: Risks & Benefits Discussed      Hepatitis B (Low risk patients): Series Not Indicated      Hepatitis B (Medium to high risk patients): Patient Declines      Zostavax: Patient Declines      TD: Td Vaccine UTD      TDAP: Tdap Vaccine UTD

## 2019-08-06 NOTE — PROGRESS NOTES
Assessment/Plan:      Diagnoses and all orders for this visit:    Medicare annual wellness visit, subsequent    Recurrent major depressive disorder, in partial remission ([facility] )  Comments:  stable, continue citalopram, also added by neurology for movement disorder   Orders:  -     citalopram (CeleXA) 10 mg tablet; Take 1 tablet (10 mg total) by mouth daily    Parkinson disease ([facility] )  Comments:  daughter states that pt was not diagnosed with parkinsons but is using sinemet for similar symptoms of a movement disorder  Orders:  -     carbidopa-levodopa (SINEMET)  mg per tablet; Take 1 tablet by mouth 3 (three) times a day    Malignant neoplasm of female breast, unspecified estrogen receptor status, unspecified laterality, unspecified site of breast ([facility] )  Comments:  history of left sided malignancy with lumpectomy  deferring mammogram in follow up    Bilateral hearing loss due to cerumen impaction  Comments:  irrigated with good results    Chronic kidney disease, stage 3 ([facility] )  Comments:  repeat bmp  Orders:  -     Basic metabolic panel; Future    Need for vaccination  Comments:  prevnar given today   Orders:  -     PNEUMOCOCCAL CONJUGATE VACCINE 13-VALENT GREATER THAN 6 MONTHS    Chronic diastolic CHF (congestive heart failure) (HCC)  -     furosemide (LASIX) 20 mg tablet; Take 1 tablet (20 mg total) by mouth daily  -     Lipid Panel with Direct LDL reflex    Other orders  -     Ear cerumen removal          Subjective:  Chief Complaint   Patient presents with   [facility] [place] Wellness Visit     Patient presents for [facility]  Patient ID: Isa Gerber is a 80 y o  female  Patient is here with her daughter today for Medicare wellness and complains of decreased hearing bilaterally  She has had problems in the past with oxygen years  She denies any nasal congestion or ear pain  Her daughter is states she tried to use Q-tips    [facility] a   and she would qualify for free hearing aids      Review of Systems   Constitutional: Negative  Negative for fatigue and fever  HENT: Positive for hearing loss  Negative for congestion and ear pain  Eyes: Negative  Respiratory: Negative  Negative for cough  Cardiovascular: Negative  Gastrointestinal: Negative  Endocrine: Negative  Genitourinary: Negative  Musculoskeletal: Negative  Skin: Negative  Allergic/Immunologic: Negative  Neurological: Negative  Psychiatric/Behavioral: Negative  The following portions of the patient's history were reviewed and updated as appropriate: allergies, current medications, past family history, past medical history, past social history, past surgical history and problem list     Objective:  Vitals:    08/05/19 0944   BP: 116/60   Pulse: 95   Resp: 16   Temp: 97 7 °F (36 5 °C)   SpO2: 96%   Height: 5' (1 524 m)      Physical Exam   Constitutional: She is oriented to person, place, and time  She appears well-developed and well-nourished  HENT:   Head: Normocephalic and atraumatic  Cerumen impaction bilaterally   Cardiovascular: Normal rate, regular rhythm and normal heart sounds  Pulmonary/Chest: Effort normal and breath sounds normal    Abdominal: Soft  Bowel sounds are normal    Neurological: She is alert and oriented to person, place, and time  Skin: Skin is warm and dry  Psychiatric: She has a normal mood and affect  Her behavior is normal  Judgment and thought content normal    Nursing note and vitals reviewed      Ear cerumen removal  Date/Time: 8/5/2019 10:20 AM  Performed by: Hema Nuñez DO  Authorized by: Hema Nuñez DO     Patient location:  Other (comment) (Office)  Indications / Diagnosis:  Cerumen impaction  Other Assisting Provider: No    Consent:     Consent obtained:  Verbal    Consent given by:  Patient    Risks discussed:  Bleeding, infection, pain, TM perforation, incomplete removal and dizziness    Alternatives discussed:  No treatment, delayed treatment, alternative treatment, observation and referral  Universal protocol:     Procedure explained and questions answered to patient or proxy's satisfaction: yes      Patient identity confirmed:  Verbally with patient  Procedure details:     Local anesthetic:  None    Location:  L ear and R ear    Procedure type: irrigation with insturmentation      Procedure type comment:  BOTH- CURETTE AND IRRIGATION    Insturmentation: curette      Approach:  External    Equipment used:    Post-procedure details:     Complication:  None    Hearing quality:  Normal    Patient tolerance of procedure: Tolerated well, no immediate complications    BMI Counseling: Body mass index is 26 37 kg/m²  The BMI is above normal  Nutrition recommendations include reducing portion sizes and 3-5 servings of fruits/vegetables daily  Exercise recommendations include exercising 3-5 times per week

## 2019-09-09 ENCOUNTER — TELEPHONE (OUTPATIENT)
Dept: FAMILY MEDICINE CLINIC | Facility: CLINIC | Age: 84
End: 2019-09-09

## 2019-09-09 NOTE — TELEPHONE ENCOUNTER
Grant at [facility] phoned  States on 9/3 you wrote an rx for aprazolam  25, 1qd at 3pm, 1 pobid prn anxiety but no quantity  We just need to call them with a quantity at [phone]

## 2019-09-09 NOTE — TELEPHONE ENCOUNTER
Aleida states you wrote a script for xanax for Carolyn Sep but there was no quantity on it  Are you able to either contact her or rewrite the script and fax it to [facility] as she hasn't had this for 7 days

## 2019-09-11 ENCOUNTER — TELEPHONE (OUTPATIENT)
Dept: FAMILY MEDICINE CLINIC | Facility: CLINIC | Age: 84
End: 2019-09-11

## 2019-09-11 DIAGNOSIS — R39.9 URINARY TRACT INFECTION SYMPTOMS: Primary | ICD-10-CM

## 2019-09-11 NOTE — TELEPHONE ENCOUNTER
[place] is [place] [address] in [facility], they are requesting a order for her to have her urine tested for UTI, please fax order to [phone]

## 2019-09-11 NOTE — TELEPHONE ENCOUNTER
Faxed the order to patient's place of residence   They sent over a paper for Dr David Sterling earlier today requesting this order, so I just attached it to that and sent it back

## 2019-09-11 NOTE — TELEPHONE ENCOUNTER
Printed order  Ok to fax  Which lab do they use  If they use [employer] or [employer] we can get results in computer    If not, we will have to call for results

## 2019-09-16 DIAGNOSIS — D50.9 IRON DEFICIENCY ANEMIA, UNSPECIFIED IRON DEFICIENCY ANEMIA TYPE: ICD-10-CM

## 2019-09-16 RX ORDER — DOXYCYCLINE HYCLATE 50 MG/1
CAPSULE, GELATIN COATED ORAL
Qty: 30 TABLET | Refills: 5 | Status: SHIPPED | OUTPATIENT
Start: 2019-09-16 | End: 2020-11-10

## 2019-09-27 ENCOUNTER — HOSPITAL ENCOUNTER (EMERGENCY)
Facility: HOSPITAL | Age: 84
Discharge: HOME | End: 2019-09-27
Attending: EMERGENCY MEDICINE | Admitting: EMERGENCY MEDICINE
Payer: MEDICARE

## 2019-09-27 VITALS
SYSTOLIC BLOOD PRESSURE: 133 MMHG | HEART RATE: 67 BPM | TEMPERATURE: 98.5 F | WEIGHT: 134.92 LBS | RESPIRATION RATE: 15 BRPM | BODY MASS INDEX: 26.35 KG/M2 | DIASTOLIC BLOOD PRESSURE: 61 MMHG | OXYGEN SATURATION: 96 %

## 2019-09-27 DIAGNOSIS — F03.90 DEMENTIA (HCC): Primary | ICD-10-CM

## 2019-09-27 LAB
ANION GAP SERPL CALCULATED.3IONS-SCNC: 7 MMOL/L (ref 4–13)
ATRIAL RATE: 67 BPM
BASOPHILS # BLD AUTO: 0.04 THOUSANDS/ΜL (ref 0–0.1)
BASOPHILS NFR BLD AUTO: 1 % (ref 0–1)
BILIRUB UR QL STRIP: NEGATIVE
BUN SERPL-MCNC: 22 MG/DL (ref 5–25)
CALCIUM SERPL-MCNC: 9.4 MG/DL (ref 8.3–10.1)
CHLORIDE SERPL-SCNC: 102 MMOL/L (ref 100–108)
CLARITY UR: CLEAR
CO2 SERPL-SCNC: 32 MMOL/L (ref 21–32)
COLOR UR: YELLOW
CREAT SERPL-MCNC: 1.19 MG/DL (ref 0.6–1.3)
EOSINOPHIL # BLD AUTO: 0.31 THOUSAND/ΜL (ref 0–0.61)
EOSINOPHIL NFR BLD AUTO: 5 % (ref 0–6)
ERYTHROCYTE [DISTWIDTH] IN BLOOD BY AUTOMATED COUNT: 13.5 % (ref 11.6–15.1)
GFR SERPL CREATININE-BSD FRML MDRD: 39 ML/MIN/1.73SQ M
GLUCOSE SERPL-MCNC: 106 MG/DL (ref 65–140)
GLUCOSE UR STRIP-MCNC: NEGATIVE MG/DL
HCT VFR BLD AUTO: 43 % (ref 34.8–46.1)
HGB BLD-MCNC: 14.2 G/DL (ref 11.5–15.4)
HGB UR QL STRIP.AUTO: NEGATIVE
IMM GRANULOCYTES # BLD AUTO: 0.02 THOUSAND/UL (ref 0–0.2)
IMM GRANULOCYTES NFR BLD AUTO: 0 % (ref 0–2)
KETONES UR STRIP-MCNC: NEGATIVE MG/DL
LEUKOCYTE ESTERASE UR QL STRIP: NEGATIVE
LYMPHOCYTES # BLD AUTO: 1.36 THOUSANDS/ΜL (ref 0.6–4.47)
LYMPHOCYTES NFR BLD AUTO: 20 % (ref 14–44)
MCH RBC QN AUTO: 31.8 PG (ref 26.8–34.3)
MCHC RBC AUTO-ENTMCNC: 33 G/DL (ref 31.4–37.4)
MCV RBC AUTO: 96 FL (ref 82–98)
MONOCYTES # BLD AUTO: 0.73 THOUSAND/ΜL (ref 0.17–1.22)
MONOCYTES NFR BLD AUTO: 11 % (ref 4–12)
NEUTROPHILS # BLD AUTO: 4.48 THOUSANDS/ΜL (ref 1.85–7.62)
NEUTS SEG NFR BLD AUTO: 63 % (ref 43–75)
NITRITE UR QL STRIP: NEGATIVE
NRBC BLD AUTO-RTO: 0 /100 WBCS
P AXIS: 61 DEGREES
PH UR STRIP.AUTO: 7 [PH]
PLATELET # BLD AUTO: 246 THOUSANDS/UL (ref 149–390)
PMV BLD AUTO: 11.6 FL (ref 8.9–12.7)
POTASSIUM SERPL-SCNC: 4 MMOL/L (ref 3.5–5.3)
PR INTERVAL: 238 MS
PROT UR STRIP-MCNC: NEGATIVE MG/DL
QRS AXIS: -64 DEGREES
QRSD INTERVAL: 92 MS
QT INTERVAL: 452 MS
QTC INTERVAL: 477 MS
RBC # BLD AUTO: 4.46 MILLION/UL (ref 3.81–5.12)
SODIUM SERPL-SCNC: 141 MMOL/L (ref 136–145)
SP GR UR STRIP.AUTO: 1.01 (ref 1–1.03)
T WAVE AXIS: 54 DEGREES
TROPONIN I SERPL-MCNC: <0.02 NG/ML
UROBILINOGEN UR QL STRIP.AUTO: 0.2 E.U./DL
VENTRICULAR RATE: 67 BPM
WBC # BLD AUTO: 6.94 THOUSAND/UL (ref 4.31–10.16)

## 2019-09-27 PROCEDURE — 81003 URINALYSIS AUTO W/O SCOPE: CPT | Performed by: EMERGENCY MEDICINE

## 2019-09-27 PROCEDURE — 84484 ASSAY OF TROPONIN QUANT: CPT | Performed by: EMERGENCY MEDICINE

## 2019-09-27 PROCEDURE — 99285 EMERGENCY DEPT VISIT HI MDM: CPT

## 2019-09-27 PROCEDURE — 80048 BASIC METABOLIC PNL TOTAL CA: CPT | Performed by: EMERGENCY MEDICINE

## 2019-09-27 PROCEDURE — 36415 COLL VENOUS BLD VENIPUNCTURE: CPT | Performed by: EMERGENCY MEDICINE

## 2019-09-27 PROCEDURE — 93010 ELECTROCARDIOGRAM REPORT: CPT | Performed by: INTERNAL MEDICINE

## 2019-09-27 PROCEDURE — 93005 ELECTROCARDIOGRAM TRACING: CPT

## 2019-09-27 PROCEDURE — 99285 EMERGENCY DEPT VISIT HI MDM: CPT | Performed by: EMERGENCY MEDICINE

## 2019-09-27 PROCEDURE — 85025 COMPLETE CBC W/AUTO DIFF WBC: CPT | Performed by: EMERGENCY MEDICINE

## 2019-09-27 NOTE — ED PROVIDER NOTES
History  Chief Complaint   Patient presents with    Altered Mental Status     EMS states that nursing home reported a period today where patient was not responding to them  Length of period unknown  Pt  alert but confused (baseline) for EMS on their arrival   Pt  voicing no complaints at this time  History from patient, her son and medics  This 66-year-old female is a nursing home resident  She has history of aortic stenosis, Parkinson's disease, dementia, remote breast cancer, chronic kidney disease and hypertension  Patient does not recall any problems today and is unsure why she is here  The son does not know either  Medics state the nursing home told them she was unresponsive at some point today  Apparently she was fine at breakfast and then was not seen again until recently and at that point was unresponsive  When the medics arrived at the nursing home the patient was alert and at baseline  She is normally somewhat demented  She has Parkinson's disease  She denies any new illness or injury  Prior to Admission Medications   Prescriptions Last Dose Informant Patient Reported? Taking? ALPRAZolam (XANAX) 0 25 mg tablet  Outside Facility (Specify) Yes No   Sig: Take 0 25 mg by mouth daily at bedtime as needed for anxiety   Ascorbic Acid (VITAMIN C) 250 MG CHEW  Outside Facility (Specify) Yes No   Sig: Chew 1 tablet daily   CRANBERRY PO  Outside Facility (Specify) Yes No   Sig: Take 1 tablet by mouth daily     Calcium-Magnesium-Vitamin D (CALCIUM 500 PO)  Outside Facility (Specify) Yes No   Sig: Take by mouth   Docusate Sodium (COLACE CLEAR PO)  Outside Facility (Specify) Yes No   Sig: Take by mouth   Lactobacillus (ACIDOPHILUS/BIFIDUS PO)  Outside Facility (Specify) Yes No   Sig: Take by mouth   Memantine HCl ER 28 MG CP24   No No   Sig: Take 1 capsule by mouth daily   acetaminophen (TYLENOL) 325 mg tablet  Outside Facility (Specify) Yes No   Sig: Take 325 mg by mouth every 6 (six) hours as needed for mild pain   aspirin (ECOTRIN LOW STRENGTH) 81 mg EC tablet  Outside Facility (Specify) Yes No   Sig: Take 81 mg by mouth every other day   carbidopa-levodopa (SINEMET)  mg per tablet   No No   Sig: Take 1 tablet by mouth 3 (three) times a day   citalopram (CeleXA) 10 mg tablet   No No   Sig: Take 1 tablet (10 mg total) by mouth daily   famotidine (PEPCID) 10 mg tablet  Outside Facility (Specify) Yes No   Sig: Take 10 mg by mouth daily     ferrous gluconate (FERGON) 324 mg tablet   No No   Sig: TAKE 1 TABLET DAILY   furosemide (LASIX) 20 mg tablet   No No   Sig: Take 1 tablet (20 mg total) by mouth daily   ibuprofen (MOTRIN) 200 mg tablet  Outside Facility (Specify) Yes No   Sig: Take 200 mg by mouth every 6 (six) hours as needed for mild pain      Facility-Administered Medications: None       Past Medical History:   Diagnosis Date    Aortic valve stenosis     [facility] cancer ([facility] )     last assessed 04/22/2014    CKD (chronic kidney disease)     hypertensive; last assessed 07/19/13    Closed compression fracture of lumbar vertebra ([facility] )     last assessed 07/19/13    Clostridium difficile colitis     last assessed 01/18/13    Costochondritis     Dementia     Depression     Diverticulitis of colon     Generalized osteoarthritis of hand     last assessed 06/18/13    GERD (gastroesophageal reflux disease)     Herpes zoster     last assessed 01/30/17    Hiatal hernia     Hypertension     last assessed 11/20/2013    Jaw disease     Neoplasm of uncertain behavior of skin     last assessed 09/03/2013    Overactive bladder     Urge incontinence of urine     last assessed 04/29/15    UTI (urinary tract infection)        Past Surgical History:   Procedure Laterality Date    BREAST LUMPECTOMY Left     BREAST SURGERY      CHOLECYSTECTOMY      HYSTERECTOMY         Family History   Problem Relation Age of Onset    Heart disease Mother     Cancer Father     Cancer Sister     Cancer Brother  Breast cancer Maternal Aunt     Rectal cancer Family      I have reviewed and agree with the history as documented  Social History     Tobacco Use    Smoking status: Never Smoker    Smokeless tobacco: Never Used   Substance Use Topics    Alcohol use: No    Drug use: No        Review of Systems   Unable to perform ROS: Dementia       Physical Exam  Physical Exam   Constitutional: She appears well-developed and well-nourished  Non-toxic appearance  She appears ill  No distress  HENT:   Head: Normocephalic and atraumatic  Mouth/Throat: Oropharynx is clear and moist    Eyes: Pupils are equal, round, and reactive to light  Conjunctivae and EOM are normal  Right eye exhibits normal extraocular motion  Left eye exhibits normal extraocular motion  Neck: Normal range of motion  Neck supple  No JVD present  No neck rigidity  Cardiovascular: Normal rate and regular rhythm  Murmur (Grade 3 crescendo systolic murmur at the left heart border) heard  Pulmonary/Chest: Effort normal and breath sounds normal  No respiratory distress  Abdominal: Soft  Bowel sounds are normal  She exhibits no mass  There is no tenderness  Musculoskeletal: Normal range of motion  She exhibits no edema or tenderness  Neurological: She is alert  She has normal strength and normal reflexes  She is disoriented  No cranial nerve deficit or sensory deficit  Coordination normal  GCS eye subscore is 4  GCS verbal subscore is 4  GCS motor subscore is 6  Skin: Skin is warm and dry  Capillary refill takes less than 2 seconds  No rash noted  Psychiatric: She has a normal mood and affect  Nursing note and vitals reviewed        Vital Signs  ED Triage Vitals [09/27/19 1305]   Temperature Pulse Respirations Blood Pressure SpO2   98 5 °F (36 9 °C) 60 16 147/66 97 %      Temp Source Heart Rate Source Patient Position - Orthostatic VS BP Location FiO2 (%)   Tympanic Monitor Lying Right arm --      Pain Score       No Pain Vitals:    09/27/19 1305 09/27/19 1530   BP: 147/66 133/61   Pulse: 60 67   Patient Position - Orthostatic VS: Lying Lying         Visual Acuity      ED Medications  Medications - No data to display    Diagnostic Studies  Results Reviewed     Procedure Component Value Units Date/Time    UA w Reflex to Microscopic [[id number]] Collected:  09/27/19 1458    Lab Status:  Final result Specimen:  Urine, Clean Catch Updated:  09/27/19 1514     Color, UA Yellow     Clarity, UA Clear     Specific Gravity, UA 1 010     pH, UA 7 0     Leukocytes, UA Negative     Nitrite, UA Negative     Protein, UA Negative mg/dl      Glucose, UA Negative mg/dl      Ketones, UA Negative mg/dl      Urobilinogen, UA 0 2 E U /dl      Bilirubin, UA Negative     Blood, UA Negative    Troponin I [[id number]]  (Normal) Collected:  09/27/19 1355    Lab Status:  Final result Specimen:  Blood from Arm, Left Updated:  09/27/19 1426     Troponin I <0 02 ng/mL     Basic metabolic panel [[id number]] Collected:  09/27/19 1355    Lab Status:  Final result Specimen:  Blood from Arm, Left Updated:  09/27/19 1420     Sodium 141 mmol/L      Potassium 4 0 mmol/L      Chloride 102 mmol/L      CO2 32 mmol/L      ANION GAP 7 mmol/L      BUN 22 mg/dL      Creatinine 1 19 mg/dL      Glucose 106 mg/dL      Calcium 9 4 mg/dL      eGFR 39 ml/min/1 73sq m     Narrative:       Lanette guidelines for Chronic Kidney Disease (CKD):     Stage 1 with normal or high GFR (GFR > 90 mL/min/1 73 square meters)    Stage 2 Mild CKD (GFR = 60-89 mL/min/1 73 square meters)    Stage 3A Moderate CKD (GFR = 45-59 mL/min/1 73 square meters)    Stage 3B Moderate CKD (GFR = 30-44 mL/min/1 73 square meters)    Stage 4 Severe CKD (GFR = 15-29 mL/min/1 73 square meters)    Stage 5 End Stage CKD (GFR <15 mL/min/1 73 square meters)  Note: GFR calculation is accurate only with a steady state creatinine    CBC and differential [[id number]] Collected:  09/27/19 6902 Lab Status:  Final result Specimen:  Blood from Arm, Left Updated:  09/27/19 1406     WBC 6 94 Thousand/uL      RBC 4 46 Million/uL      Hemoglobin 14 2 g/dL      Hematocrit 43 0 %      MCV 96 fL      MCH 31 8 pg      MCHC 33 0 g/dL      RDW 13 5 %      MPV 11 6 fL      Platelets 221 Thousands/uL      nRBC 0 /100 WBCs      Neutrophils Relative 63 %      Immat GRANS % 0 %      Lymphocytes Relative 20 %      Monocytes Relative 11 %      Eosinophils Relative 5 %      Basophils Relative 1 %      Neutrophils Absolute 4 48 Thousands/µL      Immature Grans Absolute 0 02 Thousand/uL      Lymphocytes Absolute 1 36 Thousands/µL      Monocytes Absolute 0 73 Thousand/µL      Eosinophils Absolute 0 31 Thousand/µL      Basophils Absolute 0 04 Thousands/µL                  No orders to display              Procedures  ECG 12 Lead Documentation Only  Date/Time: 9/27/2019 2:12 PM  Performed by: João Spencer DO  Authorized by: João Spencer DO     ECG reviewed by me, the ED Provider: yes    Patient location:  ED  Previous ECG:     Previous ECG:  Compared to current    Comparison ECG info:  Compared to EKG of December 31, 2018, septal infarct are    Similarity:  Changes noted  Rhythm:     Rhythm: sinus rhythm and A-V block    Ectopy:     Ectopy: none    QRS:     QRS intervals:  Normal  Conduction:     Conduction: abnormal      Abnormal conduction: incomplete RBBB and LAFB    ST segments:     ST segments:  Normal  Q waves:     Q waves:  V2           ED Course                               MDM  Number of Diagnoses or Management Options  Dementia: established and worsening     Amount and/or Complexity of Data Reviewed  Clinical lab tests: ordered and reviewed  Obtain history from someone other than the patient: yes  Review and summarize past medical records: yes  Independent visualization of images, tracings, or specimens: yes        Disposition  Final diagnoses:   Dementia     Time reflects when diagnosis was documented in both MDM as applicable and the Disposition within this note     Time User Action Codes Description Comment    9/27/2019  3:39 PM Steve Crook Add [F03 [id number]] Dementia [facility])       ED Disposition     ED Disposition Condition Date/Time Comment    Discharge Stable Fri Sep 27, 2019  3:39 PM Key Schultz discharge to home/self care  Follow-up Information     Follow up With Specialties Details Why Contact Charo Galeas, DO Family Medicine Call  As needed Ana [address] [place] [address]  [phone]            Discharge Medication List as of 9/27/2019  3:43 PM      CONTINUE these medications which have NOT CHANGED    Details   acetaminophen (TYLENOL) 325 mg tablet Take 325 mg by mouth every 6 (six) hours as needed for mild pain, Historical Med      ALPRAZolam (XANAX) 0 25 mg tablet Take 0 25 mg by mouth daily at bedtime as needed for anxiety, Historical Med      Ascorbic Acid (VITAMIN C) 250 MG CHEW Chew 1 tablet daily, Starting Wed 11/29/2017, Historical Med      aspirin (ECOTRIN LOW STRENGTH) 81 mg EC tablet Take 81 mg by mouth every other day, Historical Med      Calcium-Magnesium-Vitamin D (CALCIUM 500 PO) Take by mouth, Starting Wed 5/13/2015, Historical Med      carbidopa-levodopa (SINEMET)  mg per tablet Take 1 tablet by mouth 3 (three) times a day, Starting Mon 8/5/2019, Normal      citalopram (CeleXA) 10 mg tablet Take 1 tablet (10 mg total) by mouth daily, Starting Mon 8/5/2019, Normal      CRANBERRY PO Take 1 tablet by mouth daily  , Historical Med      Docusate Sodium (COLACE CLEAR PO) Take by mouth, Starting Wed 11/29/2017, Historical Med      famotidine (PEPCID) 10 mg tablet Take 10 mg by mouth daily  , Historical Med      ferrous gluconate (FERGON) 324 mg tablet TAKE 1 TABLET DAILY, Normal      furosemide (LASIX) 20 mg tablet Take 1 tablet (20 mg total) by mouth daily, Starting Mon 8/5/2019, Normal      ibuprofen (MOTRIN) 200 mg tablet Take 200 mg by mouth every 6 (six) hours as needed for mild pain, Historical Med      Lactobacillus (ACIDOPHILUS/BIFIDUS PO) Take by mouth, Starting Wed 4/30/2014, Historical Med      Memantine HCl ER 28 MG CP24 Take 1 capsule by mouth daily, Starting Thu 3/28/2019, Normal           No discharge procedures on file      ED Provider  Electronically Signed by           Jaxson Ewing, DO  09/29/19 7736

## 2019-09-27 NOTE — ED NOTES
As per Crystal from [employer], there will be someone to  on patient in about 30-40mins     Verena Perez  09/27/19 5868

## 2019-09-27 NOTE — ED NOTES
[address] wheelchair Emy dorsey will arrive in appx 30 minutes to provide transport to pt        Rajan Yun RN  09/27/19 5715

## 2019-10-06 DIAGNOSIS — F41.9 ANXIETY: Primary | ICD-10-CM

## 2019-10-07 RX ORDER — ALPRAZOLAM 0.25 MG/1
TABLET ORAL
Qty: 90 TABLET | Refills: 4 | Status: SHIPPED | OUTPATIENT
Start: 2019-10-07 | End: 2020-05-11

## 2020-03-26 DIAGNOSIS — G30.1 LATE ONSET ALZHEIMER'S DISEASE WITH BEHAVIORAL DISTURBANCE (HCC): ICD-10-CM

## 2020-03-26 DIAGNOSIS — F02.81 LATE ONSET ALZHEIMER'S DISEASE WITH BEHAVIORAL DISTURBANCE (HCC): ICD-10-CM

## 2020-03-26 RX ORDER — MEMANTINE HYDROCHLORIDE 28 MG/1
CAPSULE, EXTENDED RELEASE ORAL
Qty: 90 CAPSULE | Refills: 3 | Status: SHIPPED | OUTPATIENT
Start: 2020-03-26 | End: 2021-01-07 | Stop reason: DRUGHIGH

## 2020-04-06 ENCOUNTER — TELEPHONE (OUTPATIENT)
Dept: FAMILY MEDICINE CLINIC | Facility: CLINIC | Age: 85
End: 2020-04-06

## 2020-04-15 ENCOUNTER — TELEMEDICINE (OUTPATIENT)
Dept: FAMILY MEDICINE CLINIC | Facility: CLINIC | Age: 85
End: 2020-04-15
Payer: MEDICARE

## 2020-04-15 VITALS
SYSTOLIC BLOOD PRESSURE: 116 MMHG | HEART RATE: 72 BPM | BODY MASS INDEX: 26.31 KG/M2 | WEIGHT: 134 LBS | HEIGHT: 60 IN | TEMPERATURE: 97.2 F | DIASTOLIC BLOOD PRESSURE: 64 MMHG

## 2020-04-15 DIAGNOSIS — I50.32 CHRONIC DIASTOLIC CHF (CONGESTIVE HEART FAILURE) (HCC): Chronic | ICD-10-CM

## 2020-04-15 DIAGNOSIS — G20 PARKINSON DISEASE (HCC): ICD-10-CM

## 2020-04-15 DIAGNOSIS — F02.81 LATE ONSET ALZHEIMER'S DISEASE WITH BEHAVIORAL DISTURBANCE (HCC): ICD-10-CM

## 2020-04-15 DIAGNOSIS — G30.1 LATE ONSET ALZHEIMER'S DISEASE WITH BEHAVIORAL DISTURBANCE (HCC): ICD-10-CM

## 2020-04-15 DIAGNOSIS — I35.0 AORTIC STENOSIS, SEVERE: ICD-10-CM

## 2020-04-15 DIAGNOSIS — S31.809D WOUND OF BUTTOCK, UNSPECIFIED LATERALITY, SUBSEQUENT ENCOUNTER: Primary | ICD-10-CM

## 2020-04-15 PROCEDURE — 99213 OFFICE O/P EST LOW 20 MIN: CPT | Performed by: FAMILY MEDICINE

## 2020-05-06 ENCOUNTER — TELEPHONE (OUTPATIENT)
Dept: FAMILY MEDICINE CLINIC | Facility: CLINIC | Age: 85
End: 2020-05-06

## 2020-05-11 DIAGNOSIS — F41.9 ANXIETY: ICD-10-CM

## 2020-05-11 RX ORDER — ALPRAZOLAM 0.25 MG/1
TABLET ORAL
Qty: 90 TABLET | Refills: 4 | Status: SHIPPED | OUTPATIENT
Start: 2020-05-11 | End: 2020-05-12 | Stop reason: SDUPTHER

## 2020-05-12 DIAGNOSIS — F41.9 ANXIETY: ICD-10-CM

## 2020-05-12 RX ORDER — ALPRAZOLAM 0.25 MG/1
TABLET ORAL
Qty: 90 TABLET | Refills: 4 | Status: SHIPPED | OUTPATIENT
Start: 2020-05-12 | End: 2020-12-11 | Stop reason: HOSPADM

## 2020-05-22 ENCOUNTER — TELEPHONE (OUTPATIENT)
Dept: FAMILY MEDICINE CLINIC | Facility: CLINIC | Age: 85
End: 2020-05-22

## 2020-05-29 ENCOUNTER — TELEPHONE (OUTPATIENT)
Dept: FAMILY MEDICINE CLINIC | Facility: CLINIC | Age: 85
End: 2020-05-29

## 2020-07-27 DIAGNOSIS — I50.32 CHRONIC DIASTOLIC CHF (CONGESTIVE HEART FAILURE) (HCC): Chronic | ICD-10-CM

## 2020-07-27 DIAGNOSIS — G20 PARKINSON DISEASE (HCC): ICD-10-CM

## 2020-07-27 DIAGNOSIS — K58.9 IRRITABLE BOWEL SYNDROME, UNSPECIFIED TYPE: Primary | ICD-10-CM

## 2020-07-27 DIAGNOSIS — F33.41 RECURRENT MAJOR DEPRESSIVE DISORDER, IN PARTIAL REMISSION (HCC): ICD-10-CM

## 2020-07-27 RX ORDER — FUROSEMIDE 20 MG/1
20 TABLET ORAL DAILY
Qty: 90 TABLET | Refills: 0 | Status: SHIPPED | OUTPATIENT
Start: 2020-07-27 | End: 2020-10-27

## 2020-07-27 RX ORDER — CITALOPRAM 10 MG/1
10 TABLET ORAL DAILY
Qty: 90 TABLET | Refills: 0 | Status: SHIPPED | OUTPATIENT
Start: 2020-07-27 | End: 2020-10-22

## 2020-07-28 RX ORDER — SELENIUM 50 MCG
TABLET ORAL
Qty: 90 CAPSULE | Refills: 4 | Status: SHIPPED | OUTPATIENT
Start: 2020-07-28

## 2020-07-30 NOTE — TELEPHONE ENCOUNTER
I spoke with Nazario Cruz- pt is at Wound care this morning  Wound is healing but they want to see pt a week from now to check  Daughter wanted to know if you could follow up next week with her instead of taking her to wound care  What do you think? Nazario Cruz is making a week from today apt with wound crae just in case you are not able to come, but I told her I would call her back  Introduction Text (Please End With A Colon): The following procedure was deferred: Detail Level: Detailed Instructions (Optional): Will treat when he returns for a FBSE Procedure To Be Performed At Next Visit: Cryotherapy

## 2020-08-07 ENCOUNTER — TELEPHONE (OUTPATIENT)
Dept: FAMILY MEDICINE CLINIC | Facility: CLINIC | Age: 85
End: 2020-08-07

## 2020-08-07 DIAGNOSIS — G89.29 CHRONIC BILATERAL LOW BACK PAIN WITHOUT SCIATICA: ICD-10-CM

## 2020-08-07 DIAGNOSIS — R29.898 MUSCULAR DECONDITIONING: Primary | ICD-10-CM
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
## 2020-08-07 DIAGNOSIS — M54.50 CHRONIC BILATERAL LOW BACK PAIN WITHOUT SCIATICA: ICD-10-CM

## 2020-08-07 DIAGNOSIS — R54 FRAILTY: ICD-10-CM

## 2020-08-07 DIAGNOSIS — R26.9 GAIT DISTURBANCE: ICD-10-CM

## 2020-08-07 NOTE — TELEPHONE ENCOUNTER
Contacted Patrizia, regarding order for Service to [facility].  Patrizia would prefer to meet face to face.  Will schedule visit for 07/07 at 1000.   Ok to fax

## 2020-09-23 DIAGNOSIS — M15.9 PRIMARY OSTEOARTHRITIS INVOLVING MULTIPLE JOINTS: Primary | ICD-10-CM

## 2020-09-23 RX ORDER — ACETAMINOPHEN 325 MG/1
325 TABLET ORAL EVERY 6 HOURS PRN
Qty: 30 TABLET | Refills: 5 | Status: SHIPPED | OUTPATIENT
Start: 2020-09-23

## 2020-09-25 ENCOUNTER — TELEPHONE (OUTPATIENT)
Dept: FAMILY MEDICINE CLINIC | Facility: CLINIC | Age: 85
End: 2020-09-25

## 2020-09-25 DIAGNOSIS — L03.039 CELLULITIS OF TOE, UNSPECIFIED LATERALITY: Primary | ICD-10-CM

## 2020-09-25 RX ORDER — METRONIDAZOLE 500 MG/1
TABLET ORAL
COMMUNITY
Start: 2020-09-17 | End: 2020-12-11 | Stop reason: HOSPADM

## 2020-09-25 RX ORDER — NITROFURANTOIN 25; 75 MG/1; MG/1
CAPSULE ORAL
COMMUNITY
Start: 2020-08-13 | End: 2020-12-11 | Stop reason: HOSPADM

## 2020-09-25 RX ORDER — CEFUROXIME AXETIL 250 MG/1
250 TABLET ORAL EVERY 12 HOURS SCHEDULED
Qty: 14 TABLET | Refills: 0 | Status: SHIPPED | OUTPATIENT
Start: 2020-09-25 | End: 2020-10-02

## 2020-10-08 ENCOUNTER — TELEPHONE (OUTPATIENT)
Dept: FAMILY MEDICINE CLINIC | Facility: CLINIC | Age: 85
End: 2020-10-08

## 2020-10-16 DIAGNOSIS — N30.90 CYSTITIS: Primary | ICD-10-CM

## 2020-10-16 RX ORDER — GRANULES FOR ORAL 3 G/1
3 POWDER ORAL ONCE
Qty: 3 G | Refills: 0 | Status: SHIPPED | OUTPATIENT
Start: 2020-10-16 | End: 2020-10-16

## 2020-10-21 ENCOUNTER — TELEMEDICINE (OUTPATIENT)
Dept: FAMILY MEDICINE CLINIC | Facility: CLINIC | Age: 85
End: 2020-10-21
Payer: MEDICARE

## 2020-10-21 VITALS — BODY MASS INDEX: 26.17 KG/M2 | HEIGHT: 60 IN

## 2020-10-21 DIAGNOSIS — I35.0 AORTIC STENOSIS, SEVERE: ICD-10-CM

## 2020-10-21 DIAGNOSIS — R26.9 GAIT DISTURBANCE: ICD-10-CM

## 2020-10-21 DIAGNOSIS — F02.81 LATE ONSET ALZHEIMER'S DISEASE WITH BEHAVIORAL DISTURBANCE (HCC): Primary | ICD-10-CM

## 2020-10-21 DIAGNOSIS — I50.32 CHRONIC DIASTOLIC CHF (CONGESTIVE HEART FAILURE) (HCC): Chronic | ICD-10-CM

## 2020-10-21 DIAGNOSIS — G20 PARKINSON DISEASE (HCC): ICD-10-CM

## 2020-10-21 DIAGNOSIS — G30.1 LATE ONSET ALZHEIMER'S DISEASE WITH BEHAVIORAL DISTURBANCE (HCC): Primary | ICD-10-CM

## 2020-10-21 DIAGNOSIS — L97.501 SKIN ULCER OF TOE, LIMITED TO BREAKDOWN OF SKIN, UNSPECIFIED LATERALITY (HCC): ICD-10-CM

## 2020-10-21 DIAGNOSIS — R29.898 MUSCULAR DECONDITIONING: ICD-10-CM

## 2020-10-21 PROCEDURE — 99213 OFFICE O/P EST LOW 20 MIN: CPT | Performed by: FAMILY MEDICINE

## 2020-10-22 DIAGNOSIS — F33.41 RECURRENT MAJOR DEPRESSIVE DISORDER, IN PARTIAL REMISSION (HCC): ICD-10-CM

## 2020-10-22 DIAGNOSIS — G20 PARKINSON DISEASE (HCC): ICD-10-CM

## 2020-10-22 RX ORDER — CITALOPRAM 10 MG/1
TABLET ORAL
Qty: 90 TABLET | Refills: 0 | Status: SHIPPED | OUTPATIENT
Start: 2020-10-22

## 2020-10-27 DIAGNOSIS — I50.32 CHRONIC DIASTOLIC CHF (CONGESTIVE HEART FAILURE) (HCC): Chronic | ICD-10-CM

## 2020-10-27 RX ORDER — FUROSEMIDE 20 MG/1
TABLET ORAL
Qty: 90 TABLET | Refills: 0 | Status: SHIPPED | OUTPATIENT
Start: 2020-10-27

## 2020-10-28 ENCOUNTER — TELEPHONE (OUTPATIENT)
Dept: FAMILY MEDICINE CLINIC | Facility: CLINIC | Age: 85
End: 2020-10-28

## 2020-11-10 DIAGNOSIS — D50.9 IRON DEFICIENCY ANEMIA, UNSPECIFIED IRON DEFICIENCY ANEMIA TYPE: ICD-10-CM

## 2020-11-10 RX ORDER — DOXYCYCLINE HYCLATE 50 MG/1
CAPSULE, GELATIN COATED ORAL
Qty: 30 TABLET | Refills: 5 | Status: SHIPPED | OUTPATIENT
Start: 2020-11-10

## 2020-12-01 ENCOUNTER — HOSPITAL ENCOUNTER (INPATIENT)
Facility: HOSPITAL | Age: 85
LOS: 9 days | Discharge: NURSING, REHAB OR LONG-TERM CARE | DRG: 853 | End: 2020-12-11
Attending: EMERGENCY MEDICINE | Admitting: INTERNAL MEDICINE
Payer: MEDICARE

## 2020-12-01 ENCOUNTER — APPOINTMENT (EMERGENCY)
Dept: RADIOLOGY | Facility: HOSPITAL | Age: 85
DRG: 853 | End: 2020-12-01
Payer: MEDICARE

## 2020-12-01 DIAGNOSIS — N39.0 URINARY TRACT INFECTION: ICD-10-CM

## 2020-12-01 DIAGNOSIS — L03.119 CELLULITIS OF FOOT: ICD-10-CM

## 2020-12-01 DIAGNOSIS — M86.9 TOE OSTEOMYELITIS, LEFT (HCC): ICD-10-CM

## 2020-12-01 DIAGNOSIS — R65.21 SEPTIC SHOCK (HCC): ICD-10-CM

## 2020-12-01 DIAGNOSIS — M86.9 OSTEOMYELITIS (HCC): ICD-10-CM

## 2020-12-01 DIAGNOSIS — R53.1 GENERALIZED WEAKNESS: ICD-10-CM

## 2020-12-01 DIAGNOSIS — I73.9 PERIPHERAL VASCULAR DISEASE (HCC): ICD-10-CM

## 2020-12-01 DIAGNOSIS — I73.9 PERIPHERAL ARTERIAL DISEASE (HCC): ICD-10-CM

## 2020-12-01 DIAGNOSIS — L97.909 ATHEROSCLEROSIS OF ARTERY OF EXTREMITY WITH ULCERATION (HCC): ICD-10-CM

## 2020-12-01 DIAGNOSIS — I70.299 ATHEROSCLEROSIS OF ARTERY OF EXTREMITY WITH ULCERATION (HCC): ICD-10-CM

## 2020-12-01 DIAGNOSIS — A41.9 SEPTIC SHOCK (HCC): ICD-10-CM

## 2020-12-01 DIAGNOSIS — R06.02 SHORTNESS OF BREATH: ICD-10-CM

## 2020-12-01 DIAGNOSIS — R06.02 SOB (SHORTNESS OF BREATH): Primary | ICD-10-CM

## 2020-12-01 LAB
ALBUMIN SERPL BCP-MCNC: 3.7 G/DL (ref 3.5–5)
ALP SERPL-CCNC: 119 U/L (ref 46–116)
ALT SERPL W P-5'-P-CCNC: 12 U/L (ref 12–78)
ANION GAP SERPL CALCULATED.3IONS-SCNC: 10 MMOL/L (ref 4–13)
APTT PPP: 35 SECONDS (ref 23–37)
AST SERPL W P-5'-P-CCNC: 22 U/L (ref 5–45)
BASOPHILS # BLD AUTO: 0.05 THOUSANDS/ΜL (ref 0–0.1)
BASOPHILS NFR BLD AUTO: 0 % (ref 0–1)
BILIRUB SERPL-MCNC: 0.5 MG/DL (ref 0.2–1)
BUN SERPL-MCNC: 28 MG/DL (ref 5–25)
CALCIUM SERPL-MCNC: 8.9 MG/DL (ref 8.3–10.1)
CHLORIDE SERPL-SCNC: 101 MMOL/L (ref 100–108)
CK SERPL-CCNC: 113 U/L (ref 26–192)
CO2 SERPL-SCNC: 30 MMOL/L (ref 21–32)
CREAT SERPL-MCNC: 1.49 MG/DL (ref 0.6–1.3)
CRP SERPL QL: 25.1 MG/L
EOSINOPHIL # BLD AUTO: 0.24 THOUSAND/ΜL (ref 0–0.61)
EOSINOPHIL NFR BLD AUTO: 2 % (ref 0–6)
ERYTHROCYTE [DISTWIDTH] IN BLOOD BY AUTOMATED COUNT: 12.5 % (ref 11.6–15.1)
ERYTHROCYTE [SEDIMENTATION RATE] IN BLOOD: 14 MM/HOUR (ref 0–29)
GFR SERPL CREATININE-BSD FRML MDRD: 29 ML/MIN/1.73SQ M
GLUCOSE SERPL-MCNC: 122 MG/DL (ref 65–140)
HCT VFR BLD AUTO: 44.2 % (ref 34.8–46.1)
HGB BLD-MCNC: 14.3 G/DL (ref 11.5–15.4)
IMM GRANULOCYTES # BLD AUTO: 0.08 THOUSAND/UL (ref 0–0.2)
IMM GRANULOCYTES NFR BLD AUTO: 1 % (ref 0–2)
INR PPP: 1.11 (ref 0.84–1.19)
LIPASE SERPL-CCNC: 264 U/L (ref 73–393)
LYMPHOCYTES # BLD AUTO: 1.27 THOUSANDS/ΜL (ref 0.6–4.47)
LYMPHOCYTES NFR BLD AUTO: 8 % (ref 14–44)
MAGNESIUM SERPL-MCNC: 2.3 MG/DL (ref 1.6–2.6)
MCH RBC QN AUTO: 32.3 PG (ref 26.8–34.3)
MCHC RBC AUTO-ENTMCNC: 32.4 G/DL (ref 31.4–37.4)
MCV RBC AUTO: 100 FL (ref 82–98)
MONOCYTES # BLD AUTO: 0.89 THOUSAND/ΜL (ref 0.17–1.22)
MONOCYTES NFR BLD AUTO: 6 % (ref 4–12)
NEUTROPHILS # BLD AUTO: 12.5 THOUSANDS/ΜL (ref 1.85–7.62)
NEUTS SEG NFR BLD AUTO: 83 % (ref 43–75)
NRBC BLD AUTO-RTO: 0 /100 WBCS
NT-PROBNP SERPL-MCNC: 422 PG/ML
PLATELET # BLD AUTO: 211 THOUSANDS/UL (ref 149–390)
PMV BLD AUTO: 12.3 FL (ref 8.9–12.7)
POTASSIUM SERPL-SCNC: 4.1 MMOL/L (ref 3.5–5.3)
PROT SERPL-MCNC: 8.3 G/DL (ref 6.4–8.2)
PROTHROMBIN TIME: 14.3 SECONDS (ref 11.6–14.5)
RBC # BLD AUTO: 4.43 MILLION/UL (ref 3.81–5.12)
SODIUM SERPL-SCNC: 141 MMOL/L (ref 136–145)
TROPONIN I SERPL-MCNC: <0.02 NG/ML
WBC # BLD AUTO: 15.03 THOUSAND/UL (ref 4.31–10.16)

## 2020-12-01 PROCEDURE — 73630 X-RAY EXAM OF FOOT: CPT

## 2020-12-01 PROCEDURE — 87040 BLOOD CULTURE FOR BACTERIA: CPT | Performed by: EMERGENCY MEDICINE

## 2020-12-01 PROCEDURE — 71045 X-RAY EXAM CHEST 1 VIEW: CPT

## 2020-12-01 PROCEDURE — 36415 COLL VENOUS BLD VENIPUNCTURE: CPT | Performed by: EMERGENCY MEDICINE

## 2020-12-01 PROCEDURE — 83605 ASSAY OF LACTIC ACID: CPT | Performed by: EMERGENCY MEDICINE

## 2020-12-01 PROCEDURE — U0003 INFECTIOUS AGENT DETECTION BY NUCLEIC ACID (DNA OR RNA); SEVERE ACUTE RESPIRATORY SYNDROME CORONAVIRUS 2 (SARS-COV-2) (CORONAVIRUS DISEASE [COVID-19]), AMPLIFIED PROBE TECHNIQUE, MAKING USE OF HIGH THROUGHPUT TECHNOLOGIES AS DESCRIBED BY CMS-2020-01-R: HCPCS | Performed by: EMERGENCY MEDICINE

## 2020-12-01 PROCEDURE — 81001 URINALYSIS AUTO W/SCOPE: CPT | Performed by: EMERGENCY MEDICINE

## 2020-12-01 PROCEDURE — 84484 ASSAY OF TROPONIN QUANT: CPT | Performed by: EMERGENCY MEDICINE

## 2020-12-01 PROCEDURE — 87070 CULTURE OTHR SPECIMN AEROBIC: CPT | Performed by: EMERGENCY MEDICINE

## 2020-12-01 PROCEDURE — 93005 ELECTROCARDIOGRAM TRACING: CPT

## 2020-12-01 PROCEDURE — 83690 ASSAY OF LIPASE: CPT | Performed by: EMERGENCY MEDICINE

## 2020-12-01 PROCEDURE — 1123F ACP DISCUSS/DSCN MKR DOCD: CPT | Performed by: EMERGENCY MEDICINE

## 2020-12-01 PROCEDURE — 85730 THROMBOPLASTIN TIME PARTIAL: CPT | Performed by: EMERGENCY MEDICINE

## 2020-12-01 PROCEDURE — 82550 ASSAY OF CK (CPK): CPT | Performed by: EMERGENCY MEDICINE

## 2020-12-01 PROCEDURE — 87086 URINE CULTURE/COLONY COUNT: CPT | Performed by: EMERGENCY MEDICINE

## 2020-12-01 PROCEDURE — 83735 ASSAY OF MAGNESIUM: CPT | Performed by: EMERGENCY MEDICINE

## 2020-12-01 PROCEDURE — 87631 RESP VIRUS 3-5 TARGETS: CPT | Performed by: EMERGENCY MEDICINE

## 2020-12-01 PROCEDURE — 87181 SC STD AGAR DILUTION PER AGT: CPT | Performed by: EMERGENCY MEDICINE

## 2020-12-01 PROCEDURE — 87186 SC STD MICRODIL/AGAR DIL: CPT | Performed by: EMERGENCY MEDICINE

## 2020-12-01 PROCEDURE — 96365 THER/PROPH/DIAG IV INF INIT: CPT

## 2020-12-01 PROCEDURE — 84145 PROCALCITONIN (PCT): CPT | Performed by: EMERGENCY MEDICINE

## 2020-12-01 PROCEDURE — 86140 C-REACTIVE PROTEIN: CPT | Performed by: EMERGENCY MEDICINE

## 2020-12-01 PROCEDURE — 87205 SMEAR GRAM STAIN: CPT | Performed by: EMERGENCY MEDICINE

## 2020-12-01 PROCEDURE — 85610 PROTHROMBIN TIME: CPT | Performed by: EMERGENCY MEDICINE

## 2020-12-01 PROCEDURE — 80053 COMPREHEN METABOLIC PANEL: CPT | Performed by: EMERGENCY MEDICINE

## 2020-12-01 PROCEDURE — 96375 TX/PRO/DX INJ NEW DRUG ADDON: CPT

## 2020-12-01 PROCEDURE — 85652 RBC SED RATE AUTOMATED: CPT | Performed by: EMERGENCY MEDICINE

## 2020-12-01 PROCEDURE — 99285 EMERGENCY DEPT VISIT HI MDM: CPT

## 2020-12-01 PROCEDURE — 99285 EMERGENCY DEPT VISIT HI MDM: CPT | Performed by: EMERGENCY MEDICINE

## 2020-12-01 PROCEDURE — 83880 ASSAY OF NATRIURETIC PEPTIDE: CPT | Performed by: EMERGENCY MEDICINE

## 2020-12-01 PROCEDURE — 87077 CULTURE AEROBIC IDENTIFY: CPT | Performed by: EMERGENCY MEDICINE

## 2020-12-01 PROCEDURE — 85025 COMPLETE CBC W/AUTO DIFF WBC: CPT | Performed by: EMERGENCY MEDICINE

## 2020-12-01 PROCEDURE — 87449 NOS EACH ORGANISM AG IA: CPT | Performed by: EMERGENCY MEDICINE

## 2020-12-01 RX ORDER — VANCOMYCIN HYDROCHLORIDE 1 G/200ML
15 INJECTION, SOLUTION INTRAVENOUS ONCE
Status: COMPLETED | OUTPATIENT
Start: 2020-12-01 | End: 2020-12-02

## 2020-12-01 RX ORDER — CEFEPIME HYDROCHLORIDE 2 G/50ML
2000 INJECTION, SOLUTION INTRAVENOUS EVERY 12 HOURS
Status: DISCONTINUED | OUTPATIENT
Start: 2020-12-01 | End: 2020-12-02

## 2020-12-01 RX ADMIN — CEFEPIME HYDROCHLORIDE 2000 MG: 2 INJECTION, SOLUTION INTRAVENOUS at 23:37

## 2020-12-01 RX ADMIN — VANCOMYCIN HYDROCHLORIDE 1000 MG: 1 INJECTION, SOLUTION INTRAVENOUS at 23:53

## 2020-12-02 PROBLEM — L03.116 CELLULITIS OF LEFT FOOT: Status: ACTIVE | Noted: 2020-12-02

## 2020-12-02 PROBLEM — A41.9 SEPSIS (HCC): Status: ACTIVE | Noted: 2020-12-02

## 2020-12-02 PROBLEM — R65.21 SEPTIC SHOCK (HCC): Status: ACTIVE | Noted: 2020-12-02

## 2020-12-02 PROBLEM — N18.9 ACUTE KIDNEY INJURY SUPERIMPOSED ON CHRONIC KIDNEY DISEASE (HCC): Status: ACTIVE | Noted: 2018-07-17

## 2020-12-02 PROBLEM — N30.00 ACUTE CYSTITIS WITHOUT HEMATURIA: Status: ACTIVE | Noted: 2020-12-02

## 2020-12-02 PROBLEM — A41.9 SEPTIC SHOCK (HCC): Status: ACTIVE | Noted: 2020-12-02

## 2020-12-02 PROBLEM — N17.9 ACUTE KIDNEY INJURY SUPERIMPOSED ON CHRONIC KIDNEY DISEASE (HCC): Status: ACTIVE | Noted: 2018-07-17

## 2020-12-02 PROBLEM — N39.0 UTI (URINARY TRACT INFECTION): Status: ACTIVE | Noted: 2020-12-02

## 2020-12-02 LAB
ANION GAP SERPL CALCULATED.3IONS-SCNC: 6 MMOL/L (ref 4–13)
ATRIAL RATE: 105 BPM
BACTERIA UR QL AUTO: ABNORMAL /HPF
BASE EXCESS BLDA CALC-SCNC: 5 MMOL/L (ref -2–3)
BASOPHILS # BLD AUTO: 0.04 THOUSANDS/ΜL (ref 0–0.1)
BASOPHILS NFR BLD AUTO: 0 % (ref 0–1)
BILIRUB UR QL STRIP: NEGATIVE
BUN SERPL-MCNC: 26 MG/DL (ref 5–25)
CA-I BLD-SCNC: 1.1 MMOL/L (ref 1.12–1.32)
CALCIUM SERPL-MCNC: 7.5 MG/DL (ref 8.3–10.1)
CHLORIDE SERPL-SCNC: 106 MMOL/L (ref 100–108)
CLARITY UR: ABNORMAL
CO2 SERPL-SCNC: 28 MMOL/L (ref 21–32)
COLOR UR: YELLOW
CREAT SERPL-MCNC: 1.18 MG/DL (ref 0.6–1.3)
EOSINOPHIL # BLD AUTO: 0.11 THOUSAND/ΜL (ref 0–0.61)
EOSINOPHIL NFR BLD AUTO: 1 % (ref 0–6)
ERYTHROCYTE [DISTWIDTH] IN BLOOD BY AUTOMATED COUNT: 12.6 % (ref 11.6–15.1)
FLUAV RNA NPH QL NAA+PROBE: NORMAL
FLUBV RNA NPH QL NAA+PROBE: NORMAL
GFR SERPL CREATININE-BSD FRML MDRD: 39 ML/MIN/1.73SQ M
GLUCOSE SERPL-MCNC: 100 MG/DL (ref 65–140)
GLUCOSE SERPL-MCNC: 138 MG/DL (ref 65–140)
GLUCOSE UR STRIP-MCNC: NEGATIVE MG/DL
HCO3 BLDA-SCNC: 31.1 MMOL/L (ref 24–30)
HCT VFR BLD AUTO: 34.9 % (ref 34.8–46.1)
HCT VFR BLD CALC: 38 % (ref 34.8–46.1)
HGB BLD-MCNC: 11.5 G/DL (ref 11.5–15.4)
HGB BLDA-MCNC: 12.9 G/DL (ref 11.5–15.4)
HGB UR QL STRIP.AUTO: ABNORMAL
IMM GRANULOCYTES # BLD AUTO: 0.08 THOUSAND/UL (ref 0–0.2)
IMM GRANULOCYTES NFR BLD AUTO: 1 % (ref 0–2)
KETONES UR STRIP-MCNC: NEGATIVE MG/DL
L PNEUMO1 AG UR QL IA.RAPID: NEGATIVE
LACTATE SERPL-SCNC: 1.4 MMOL/L (ref 0.5–2)
LACTATE SERPL-SCNC: 2.9 MMOL/L (ref 0.5–2)
LACTATE SERPL-SCNC: 4.5 MMOL/L (ref 0.5–2)
LEUKOCYTE ESTERASE UR QL STRIP: ABNORMAL
LYMPHOCYTES # BLD AUTO: 1.32 THOUSANDS/ΜL (ref 0.6–4.47)
LYMPHOCYTES NFR BLD AUTO: 8 % (ref 14–44)
MAGNESIUM SERPL-MCNC: 2 MG/DL (ref 1.6–2.6)
MCH RBC QN AUTO: 32.9 PG (ref 26.8–34.3)
MCHC RBC AUTO-ENTMCNC: 33 G/DL (ref 31.4–37.4)
MCV RBC AUTO: 100 FL (ref 82–98)
MONOCYTES # BLD AUTO: 1.18 THOUSAND/ΜL (ref 0.17–1.22)
MONOCYTES NFR BLD AUTO: 7 % (ref 4–12)
NEUTROPHILS # BLD AUTO: 13.65 THOUSANDS/ΜL (ref 1.85–7.62)
NEUTS SEG NFR BLD AUTO: 83 % (ref 43–75)
NITRITE UR QL STRIP: POSITIVE
NON-SQ EPI CELLS URNS QL MICRO: ABNORMAL /HPF
NRBC BLD AUTO-RTO: 0 /100 WBCS
P AXIS: 79 DEGREES
PCO2 BLD: 33 MMOL/L (ref 21–32)
PCO2 BLD: 53 MM HG (ref 42–50)
PH BLD: 7.38 [PH] (ref 7.3–7.4)
PH UR STRIP.AUTO: 6 [PH]
PLATELET # BLD AUTO: 153 THOUSANDS/UL (ref 149–390)
PMV BLD AUTO: 11.7 FL (ref 8.9–12.7)
PO2 BLD: 28 MM HG (ref 35–45)
POTASSIUM BLD-SCNC: 4.1 MMOL/L (ref 3.5–5.3)
POTASSIUM SERPL-SCNC: 3.9 MMOL/L (ref 3.5–5.3)
PR INTERVAL: 222 MS
PROCALCITONIN SERPL-MCNC: 0.05 NG/ML
PROT UR STRIP-MCNC: ABNORMAL MG/DL
QRS AXIS: -72 DEGREES
QRSD INTERVAL: 82 MS
QT INTERVAL: 326 MS
QTC INTERVAL: 430 MS
RBC # BLD AUTO: 3.5 MILLION/UL (ref 3.81–5.12)
RBC #/AREA URNS AUTO: ABNORMAL /HPF
RSV RNA NPH QL NAA+PROBE: NORMAL
S PNEUM AG UR QL: NEGATIVE
SAO2 % BLD FROM PO2: 49 % (ref 60–85)
SARS-COV-2 N GENE RESP QL NAA+PROBE: NEGATIVE
SODIUM BLD-SCNC: 142 MMOL/L (ref 136–145)
SODIUM SERPL-SCNC: 140 MMOL/L (ref 136–145)
SP GR UR STRIP.AUTO: 1.02 (ref 1–1.03)
SPECIMEN SOURCE: ABNORMAL
T WAVE AXIS: 85 DEGREES
UROBILINOGEN UR QL STRIP.AUTO: 0.2 E.U./DL
VENTRICULAR RATE: 105 BPM
WBC # BLD AUTO: 16.38 THOUSAND/UL (ref 4.31–10.16)
WBC #/AREA URNS AUTO: ABNORMAL /HPF
WBC CLUMPS # UR AUTO: PRESENT /UL

## 2020-12-02 PROCEDURE — 99223 1ST HOSP IP/OBS HIGH 75: CPT | Performed by: INTERNAL MEDICINE

## 2020-12-02 PROCEDURE — 84295 ASSAY OF SERUM SODIUM: CPT

## 2020-12-02 PROCEDURE — 82947 ASSAY GLUCOSE BLOOD QUANT: CPT

## 2020-12-02 PROCEDURE — 83735 ASSAY OF MAGNESIUM: CPT | Performed by: NURSE PRACTITIONER

## 2020-12-02 PROCEDURE — 83605 ASSAY OF LACTIC ACID: CPT | Performed by: NURSE PRACTITIONER

## 2020-12-02 PROCEDURE — 36415 COLL VENOUS BLD VENIPUNCTURE: CPT | Performed by: EMERGENCY MEDICINE

## 2020-12-02 PROCEDURE — 93010 ELECTROCARDIOGRAM REPORT: CPT | Performed by: INTERNAL MEDICINE

## 2020-12-02 PROCEDURE — 80048 BASIC METABOLIC PNL TOTAL CA: CPT | Performed by: NURSE PRACTITIONER

## 2020-12-02 PROCEDURE — 83605 ASSAY OF LACTIC ACID: CPT | Performed by: EMERGENCY MEDICINE

## 2020-12-02 PROCEDURE — 84132 ASSAY OF SERUM POTASSIUM: CPT

## 2020-12-02 PROCEDURE — 82330 ASSAY OF CALCIUM: CPT

## 2020-12-02 PROCEDURE — 85014 HEMATOCRIT: CPT

## 2020-12-02 PROCEDURE — 85025 COMPLETE CBC W/AUTO DIFF WBC: CPT | Performed by: NURSE PRACTITIONER

## 2020-12-02 PROCEDURE — 82803 BLOOD GASES ANY COMBINATION: CPT

## 2020-12-02 RX ORDER — FAMOTIDINE 20 MG/1
10 TABLET, FILM COATED ORAL DAILY
Status: DISCONTINUED | OUTPATIENT
Start: 2020-12-02 | End: 2020-12-11 | Stop reason: HOSPADM

## 2020-12-02 RX ORDER — ASPIRIN 81 MG/1
81 TABLET ORAL EVERY OTHER DAY
Status: DISCONTINUED | OUTPATIENT
Start: 2020-12-02 | End: 2020-12-11 | Stop reason: HOSPADM

## 2020-12-02 RX ORDER — ACETAMINOPHEN 325 MG/1
650 TABLET ORAL EVERY 6 HOURS PRN
Status: DISCONTINUED | OUTPATIENT
Start: 2020-12-02 | End: 2020-12-11 | Stop reason: HOSPADM

## 2020-12-02 RX ORDER — DOXYCYCLINE HYCLATE 50 MG/1
324 CAPSULE, GELATIN COATED ORAL DAILY
Status: DISCONTINUED | OUTPATIENT
Start: 2020-12-02 | End: 2020-12-11 | Stop reason: HOSPADM

## 2020-12-02 RX ORDER — CITALOPRAM 10 MG/1
10 TABLET ORAL DAILY
Status: DISCONTINUED | OUTPATIENT
Start: 2020-12-02 | End: 2020-12-11 | Stop reason: HOSPADM

## 2020-12-02 RX ORDER — MEMANTINE HYDROCHLORIDE 5 MG/1
10 TABLET ORAL 2 TIMES DAILY
Status: DISCONTINUED | OUTPATIENT
Start: 2020-12-02 | End: 2020-12-11 | Stop reason: HOSPADM

## 2020-12-02 RX ORDER — CEFEPIME HYDROCHLORIDE 2 G/50ML
2000 INJECTION, SOLUTION INTRAVENOUS EVERY 24 HOURS
Status: DISCONTINUED | OUTPATIENT
Start: 2020-12-02 | End: 2020-12-05

## 2020-12-02 RX ORDER — VANCOMYCIN HYDROCHLORIDE 1 G/200ML
20 INJECTION, SOLUTION INTRAVENOUS DAILY PRN
Status: DISCONTINUED | OUTPATIENT
Start: 2020-12-03 | End: 2020-12-05

## 2020-12-02 RX ORDER — HEPARIN SODIUM 5000 [USP'U]/ML
5000 INJECTION, SOLUTION INTRAVENOUS; SUBCUTANEOUS EVERY 8 HOURS SCHEDULED
Status: DISCONTINUED | OUTPATIENT
Start: 2020-12-02 | End: 2020-12-11 | Stop reason: HOSPADM

## 2020-12-02 RX ORDER — MEMANTINE HYDROCHLORIDE 5 MG/1
5 TABLET ORAL DAILY
Status: DISCONTINUED | OUTPATIENT
Start: 2020-12-02 | End: 2020-12-02

## 2020-12-02 RX ORDER — POLYETHYLENE GLYCOL 3350 17 G/17G
17 POWDER, FOR SOLUTION ORAL DAILY PRN
Status: DISCONTINUED | OUTPATIENT
Start: 2020-12-02 | End: 2020-12-11 | Stop reason: HOSPADM

## 2020-12-02 RX ORDER — DOCUSATE SODIUM 100 MG/1
100 CAPSULE, LIQUID FILLED ORAL 2 TIMES DAILY
Status: DISCONTINUED | OUTPATIENT
Start: 2020-12-02 | End: 2020-12-11 | Stop reason: HOSPADM

## 2020-12-02 RX ORDER — VANCOMYCIN HYDROCHLORIDE 1 G/200ML
15 INJECTION, SOLUTION INTRAVENOUS EVERY 24 HOURS
Status: DISCONTINUED | OUTPATIENT
Start: 2020-12-03 | End: 2020-12-02

## 2020-12-02 RX ADMIN — FERROUS GLUCONATE TAB 324 MG (37.5 MG ELEMENTAL IRON) 324 MG: 324 (37.5 FE) TAB at 09:32

## 2020-12-02 RX ADMIN — DOCUSATE SODIUM 100 MG: 100 CAPSULE ORAL at 18:00

## 2020-12-02 RX ADMIN — HEPARIN SODIUM 5000 UNITS: 5000 INJECTION INTRAVENOUS; SUBCUTANEOUS at 21:38

## 2020-12-02 RX ADMIN — MEMANTINE 10 MG: 5 TABLET ORAL at 09:12

## 2020-12-02 RX ADMIN — ASPIRIN 81 MG: 81 TABLET, COATED ORAL at 09:12

## 2020-12-02 RX ADMIN — DOCUSATE SODIUM 100 MG: 100 CAPSULE ORAL at 09:12

## 2020-12-02 RX ADMIN — HEPARIN SODIUM 5000 UNITS: 5000 INJECTION INTRAVENOUS; SUBCUTANEOUS at 14:04

## 2020-12-02 RX ADMIN — HEPARIN SODIUM 5000 UNITS: 5000 INJECTION INTRAVENOUS; SUBCUTANEOUS at 05:46

## 2020-12-02 RX ADMIN — CEFEPIME HYDROCHLORIDE 2000 MG: 2 INJECTION, SOLUTION INTRAVENOUS at 23:17

## 2020-12-02 RX ADMIN — MEMANTINE 10 MG: 5 TABLET ORAL at 21:38

## 2020-12-02 RX ADMIN — SODIUM CHLORIDE 1200 ML: 0.9 INJECTION, SOLUTION INTRAVENOUS at 00:44

## 2020-12-02 RX ADMIN — HEPARIN SODIUM 5000 UNITS: 5000 INJECTION INTRAVENOUS; SUBCUTANEOUS at 02:01

## 2020-12-02 RX ADMIN — FAMOTIDINE 10 MG: 20 TABLET, FILM COATED ORAL at 09:12

## 2020-12-02 RX ADMIN — CITALOPRAM HYDROBROMIDE 10 MG: 10 TABLET ORAL at 09:12

## 2020-12-03 ENCOUNTER — APPOINTMENT (INPATIENT)
Dept: NON INVASIVE DIAGNOSTICS | Facility: HOSPITAL | Age: 85
DRG: 853 | End: 2020-12-03
Payer: MEDICARE

## 2020-12-03 LAB
ALBUMIN SERPL BCP-MCNC: 2.7 G/DL (ref 3.5–5)
ALP SERPL-CCNC: 71 U/L (ref 46–116)
ALT SERPL W P-5'-P-CCNC: 19 U/L (ref 12–78)
ANION GAP SERPL CALCULATED.3IONS-SCNC: 5 MMOL/L (ref 4–13)
AST SERPL W P-5'-P-CCNC: 17 U/L (ref 5–45)
BASOPHILS # BLD AUTO: 0.03 THOUSANDS/ΜL (ref 0–0.1)
BASOPHILS NFR BLD AUTO: 1 % (ref 0–1)
BILIRUB SERPL-MCNC: 0.5 MG/DL (ref 0.2–1)
BUN SERPL-MCNC: 25 MG/DL (ref 5–25)
CALCIUM ALBUM COR SERPL-MCNC: 8.8 MG/DL (ref 8.3–10.1)
CALCIUM SERPL-MCNC: 7.8 MG/DL (ref 8.3–10.1)
CHLORIDE SERPL-SCNC: 107 MMOL/L (ref 100–108)
CO2 SERPL-SCNC: 29 MMOL/L (ref 21–32)
CREAT SERPL-MCNC: 1.1 MG/DL (ref 0.6–1.3)
EOSINOPHIL # BLD AUTO: 0.28 THOUSAND/ΜL (ref 0–0.61)
EOSINOPHIL NFR BLD AUTO: 5 % (ref 0–6)
ERYTHROCYTE [DISTWIDTH] IN BLOOD BY AUTOMATED COUNT: 12.7 % (ref 11.6–15.1)
GFR SERPL CREATININE-BSD FRML MDRD: 42 ML/MIN/1.73SQ M
GLUCOSE SERPL-MCNC: 78 MG/DL (ref 65–140)
HCT VFR BLD AUTO: 33.7 % (ref 34.8–46.1)
HGB BLD-MCNC: 11 G/DL (ref 11.5–15.4)
IMM GRANULOCYTES # BLD AUTO: 0.01 THOUSAND/UL (ref 0–0.2)
IMM GRANULOCYTES NFR BLD AUTO: 0 % (ref 0–2)
LYMPHOCYTES # BLD AUTO: 1.13 THOUSANDS/ΜL (ref 0.6–4.47)
LYMPHOCYTES NFR BLD AUTO: 21 % (ref 14–44)
MCH RBC QN AUTO: 32.6 PG (ref 26.8–34.3)
MCHC RBC AUTO-ENTMCNC: 32.6 G/DL (ref 31.4–37.4)
MCV RBC AUTO: 100 FL (ref 82–98)
MONOCYTES # BLD AUTO: 0.64 THOUSAND/ΜL (ref 0.17–1.22)
MONOCYTES NFR BLD AUTO: 12 % (ref 4–12)
NEUTROPHILS # BLD AUTO: 3.27 THOUSANDS/ΜL (ref 1.85–7.62)
NEUTS SEG NFR BLD AUTO: 61 % (ref 43–75)
NRBC BLD AUTO-RTO: 0 /100 WBCS
PLATELET # BLD AUTO: 128 THOUSANDS/UL (ref 149–390)
PMV BLD AUTO: 12.5 FL (ref 8.9–12.7)
POTASSIUM SERPL-SCNC: 3.8 MMOL/L (ref 3.5–5.3)
PROCALCITONIN SERPL-MCNC: 0.19 NG/ML
PROT SERPL-MCNC: 6 G/DL (ref 6.4–8.2)
RBC # BLD AUTO: 3.37 MILLION/UL (ref 3.81–5.12)
SODIUM SERPL-SCNC: 141 MMOL/L (ref 136–145)
VANCOMYCIN SERPL-MCNC: 6.3 UG/ML
WBC # BLD AUTO: 5.36 THOUSAND/UL (ref 4.31–10.16)

## 2020-12-03 PROCEDURE — 93922 UPR/L XTREMITY ART 2 LEVELS: CPT | Performed by: SURGERY

## 2020-12-03 PROCEDURE — 93306 TTE W/DOPPLER COMPLETE: CPT

## 2020-12-03 PROCEDURE — 93306 TTE W/DOPPLER COMPLETE: CPT | Performed by: INTERNAL MEDICINE

## 2020-12-03 PROCEDURE — 92610 EVALUATE SWALLOWING FUNCTION: CPT

## 2020-12-03 PROCEDURE — 80202 ASSAY OF VANCOMYCIN: CPT | Performed by: EMERGENCY MEDICINE

## 2020-12-03 PROCEDURE — 93923 UPR/LXTR ART STDY 3+ LVLS: CPT

## 2020-12-03 PROCEDURE — 93925 LOWER EXTREMITY STUDY: CPT | Performed by: SURGERY

## 2020-12-03 PROCEDURE — 85025 COMPLETE CBC W/AUTO DIFF WBC: CPT | Performed by: INTERNAL MEDICINE

## 2020-12-03 PROCEDURE — 97530 THERAPEUTIC ACTIVITIES: CPT

## 2020-12-03 PROCEDURE — 84145 PROCALCITONIN (PCT): CPT | Performed by: EMERGENCY MEDICINE

## 2020-12-03 PROCEDURE — 99233 SBSQ HOSP IP/OBS HIGH 50: CPT | Performed by: INTERNAL MEDICINE

## 2020-12-03 PROCEDURE — 87040 BLOOD CULTURE FOR BACTERIA: CPT | Performed by: PHYSICIAN ASSISTANT

## 2020-12-03 PROCEDURE — 93925 LOWER EXTREMITY STUDY: CPT

## 2020-12-03 PROCEDURE — 80053 COMPREHEN METABOLIC PANEL: CPT | Performed by: INTERNAL MEDICINE

## 2020-12-03 PROCEDURE — 97163 PT EVAL HIGH COMPLEX 45 MIN: CPT

## 2020-12-03 RX ORDER — VANCOMYCIN HYDROCHLORIDE 1 G/200ML
20 INJECTION, SOLUTION INTRAVENOUS ONCE
Status: COMPLETED | OUTPATIENT
Start: 2020-12-03 | End: 2020-12-03

## 2020-12-03 RX ADMIN — CITALOPRAM HYDROBROMIDE 10 MG: 10 TABLET ORAL at 08:56

## 2020-12-03 RX ADMIN — FERROUS GLUCONATE TAB 324 MG (37.5 MG ELEMENTAL IRON) 324 MG: 324 (37.5 FE) TAB at 10:05

## 2020-12-03 RX ADMIN — CARBIDOPA AND LEVODOPA 1 TABLET: 25; 100 TABLET ORAL at 17:09

## 2020-12-03 RX ADMIN — FAMOTIDINE 10 MG: 20 TABLET, FILM COATED ORAL at 08:56

## 2020-12-03 RX ADMIN — HEPARIN SODIUM 5000 UNITS: 5000 INJECTION INTRAVENOUS; SUBCUTANEOUS at 13:12

## 2020-12-03 RX ADMIN — VANCOMYCIN HYDROCHLORIDE 1000 MG: 1 INJECTION, SOLUTION INTRAVENOUS at 13:12

## 2020-12-03 RX ADMIN — DOCUSATE SODIUM 100 MG: 100 CAPSULE ORAL at 17:09

## 2020-12-03 RX ADMIN — MEMANTINE 10 MG: 5 TABLET ORAL at 08:56

## 2020-12-03 RX ADMIN — HEPARIN SODIUM 5000 UNITS: 5000 INJECTION INTRAVENOUS; SUBCUTANEOUS at 05:27

## 2020-12-03 RX ADMIN — HEPARIN SODIUM 5000 UNITS: 5000 INJECTION INTRAVENOUS; SUBCUTANEOUS at 22:09

## 2020-12-03 RX ADMIN — CARBIDOPA AND LEVODOPA 1 TABLET: 25; 100 TABLET ORAL at 22:08

## 2020-12-03 RX ADMIN — MEMANTINE 10 MG: 5 TABLET ORAL at 22:08

## 2020-12-03 RX ADMIN — DOCUSATE SODIUM 100 MG: 100 CAPSULE ORAL at 08:56

## 2020-12-03 RX ADMIN — CARBIDOPA AND LEVODOPA 1 TABLET: 25; 100 TABLET ORAL at 08:56

## 2020-12-03 RX ADMIN — CEFEPIME HYDROCHLORIDE 2000 MG: 2 INJECTION, SOLUTION INTRAVENOUS at 22:09

## 2020-12-04 PROBLEM — M86.9 TOE OSTEOMYELITIS, LEFT (HCC): Status: ACTIVE | Noted: 2020-12-02

## 2020-12-04 PROBLEM — I73.9 PERIPHERAL ARTERIAL DISEASE (HCC): Status: ACTIVE | Noted: 2020-12-04

## 2020-12-04 LAB
ANION GAP SERPL CALCULATED.3IONS-SCNC: 6 MMOL/L (ref 4–13)
BACTERIA WND AEROBE CULT: ABNORMAL
BUN SERPL-MCNC: 22 MG/DL (ref 5–25)
CALCIUM SERPL-MCNC: 8 MG/DL (ref 8.3–10.1)
CHLORIDE SERPL-SCNC: 107 MMOL/L (ref 100–108)
CO2 SERPL-SCNC: 27 MMOL/L (ref 21–32)
CREAT SERPL-MCNC: 1.03 MG/DL (ref 0.6–1.3)
ERYTHROCYTE [DISTWIDTH] IN BLOOD BY AUTOMATED COUNT: 12.5 % (ref 11.6–15.1)
GFR SERPL CREATININE-BSD FRML MDRD: 46 ML/MIN/1.73SQ M
GLUCOSE SERPL-MCNC: 92 MG/DL (ref 65–140)
GRAM STN SPEC: ABNORMAL
GRAM STN SPEC: ABNORMAL
HCT VFR BLD AUTO: 34.1 % (ref 34.8–46.1)
HGB BLD-MCNC: 11.3 G/DL (ref 11.5–15.4)
MCH RBC QN AUTO: 32.5 PG (ref 26.8–34.3)
MCHC RBC AUTO-ENTMCNC: 33.1 G/DL (ref 31.4–37.4)
MCV RBC AUTO: 98 FL (ref 82–98)
PLATELET # BLD AUTO: 135 THOUSANDS/UL (ref 149–390)
PMV BLD AUTO: 11.1 FL (ref 8.9–12.7)
POTASSIUM SERPL-SCNC: 3.8 MMOL/L (ref 3.5–5.3)
RBC # BLD AUTO: 3.48 MILLION/UL (ref 3.81–5.12)
SODIUM SERPL-SCNC: 140 MMOL/L (ref 136–145)
VANCOMYCIN TROUGH SERPL-MCNC: 12.6 UG/ML (ref 10–20)
WBC # BLD AUTO: 5.5 THOUSAND/UL (ref 4.31–10.16)

## 2020-12-04 PROCEDURE — NC001 PR NO CHARGE: Performed by: RADIOLOGY

## 2020-12-04 PROCEDURE — 97535 SELF CARE MNGMENT TRAINING: CPT

## 2020-12-04 PROCEDURE — 80048 BASIC METABOLIC PNL TOTAL CA: CPT | Performed by: INTERNAL MEDICINE

## 2020-12-04 PROCEDURE — 97167 OT EVAL HIGH COMPLEX 60 MIN: CPT

## 2020-12-04 PROCEDURE — 80202 ASSAY OF VANCOMYCIN: CPT | Performed by: INTERNAL MEDICINE

## 2020-12-04 PROCEDURE — 87081 CULTURE SCREEN ONLY: CPT | Performed by: INTERNAL MEDICINE

## 2020-12-04 PROCEDURE — 99223 1ST HOSP IP/OBS HIGH 75: CPT | Performed by: PHYSICIAN ASSISTANT

## 2020-12-04 PROCEDURE — 85027 COMPLETE CBC AUTOMATED: CPT | Performed by: INTERNAL MEDICINE

## 2020-12-04 PROCEDURE — 99233 SBSQ HOSP IP/OBS HIGH 50: CPT | Performed by: INTERNAL MEDICINE

## 2020-12-04 PROCEDURE — 92526 ORAL FUNCTION THERAPY: CPT

## 2020-12-04 PROCEDURE — 97530 THERAPEUTIC ACTIVITIES: CPT

## 2020-12-04 RX ORDER — LANOLIN ALCOHOL/MO/W.PET/CERES
3 CREAM (GRAM) TOPICAL ONCE
Status: COMPLETED | OUTPATIENT
Start: 2020-12-04 | End: 2020-12-04

## 2020-12-04 RX ORDER — ATORVASTATIN CALCIUM 40 MG/1
40 TABLET, FILM COATED ORAL
Status: DISCONTINUED | OUTPATIENT
Start: 2020-12-04 | End: 2020-12-11 | Stop reason: HOSPADM

## 2020-12-04 RX ADMIN — CITALOPRAM HYDROBROMIDE 10 MG: 10 TABLET ORAL at 09:13

## 2020-12-04 RX ADMIN — HEPARIN SODIUM 5000 UNITS: 5000 INJECTION INTRAVENOUS; SUBCUTANEOUS at 13:09

## 2020-12-04 RX ADMIN — MELATONIN TAB 3 MG 3 MG: 3 TAB at 04:00

## 2020-12-04 RX ADMIN — MEMANTINE 10 MG: 5 TABLET ORAL at 22:05

## 2020-12-04 RX ADMIN — MEMANTINE 10 MG: 5 TABLET ORAL at 09:13

## 2020-12-04 RX ADMIN — FAMOTIDINE 10 MG: 20 TABLET, FILM COATED ORAL at 09:13

## 2020-12-04 RX ADMIN — CARBIDOPA AND LEVODOPA 1 TABLET: 25; 100 TABLET ORAL at 18:04

## 2020-12-04 RX ADMIN — DOCUSATE SODIUM 100 MG: 100 CAPSULE ORAL at 09:13

## 2020-12-04 RX ADMIN — VANCOMYCIN HYDROCHLORIDE 1000 MG: 1 INJECTION, SOLUTION INTRAVENOUS at 18:04

## 2020-12-04 RX ADMIN — CARBIDOPA AND LEVODOPA 1 TABLET: 25; 100 TABLET ORAL at 22:05

## 2020-12-04 RX ADMIN — ATORVASTATIN CALCIUM 40 MG: 40 TABLET, FILM COATED ORAL at 18:04

## 2020-12-04 RX ADMIN — DOCUSATE SODIUM 100 MG: 100 CAPSULE ORAL at 18:04

## 2020-12-04 RX ADMIN — ASPIRIN 81 MG: 81 TABLET, COATED ORAL at 09:13

## 2020-12-04 RX ADMIN — CARBIDOPA AND LEVODOPA 1 TABLET: 25; 100 TABLET ORAL at 09:13

## 2020-12-04 RX ADMIN — FERROUS GLUCONATE TAB 324 MG (37.5 MG ELEMENTAL IRON) 324 MG: 324 (37.5 FE) TAB at 09:15

## 2020-12-04 RX ADMIN — HEPARIN SODIUM 5000 UNITS: 5000 INJECTION INTRAVENOUS; SUBCUTANEOUS at 22:05

## 2020-12-04 RX ADMIN — HEPARIN SODIUM 5000 UNITS: 5000 INJECTION INTRAVENOUS; SUBCUTANEOUS at 06:20

## 2020-12-05 ENCOUNTER — APPOINTMENT (INPATIENT)
Dept: RADIOLOGY | Facility: HOSPITAL | Age: 85
DRG: 853 | End: 2020-12-05
Payer: MEDICARE

## 2020-12-05 PROBLEM — R82.71 BACTERIURIA: Status: ACTIVE | Noted: 2020-12-02

## 2020-12-05 LAB
ANION GAP SERPL CALCULATED.3IONS-SCNC: 8 MMOL/L (ref 4–13)
BACTERIA BLD CULT: ABNORMAL
BACTERIA BLD CULT: ABNORMAL
BACTERIA UR CULT: ABNORMAL
BUN SERPL-MCNC: 23 MG/DL (ref 5–25)
CALCIUM SERPL-MCNC: 8.1 MG/DL (ref 8.3–10.1)
CHLORIDE SERPL-SCNC: 104 MMOL/L (ref 100–108)
CO2 SERPL-SCNC: 26 MMOL/L (ref 21–32)
CREAT SERPL-MCNC: 1.02 MG/DL (ref 0.6–1.3)
ERYTHROCYTE [DISTWIDTH] IN BLOOD BY AUTOMATED COUNT: 12.4 % (ref 11.6–15.1)
GFR SERPL CREATININE-BSD FRML MDRD: 46 ML/MIN/1.73SQ M
GLUCOSE SERPL-MCNC: 94 MG/DL (ref 65–140)
GRAM STN SPEC: ABNORMAL
GRAM STN SPEC: ABNORMAL
HCT VFR BLD AUTO: 34.6 % (ref 34.8–46.1)
HGB BLD-MCNC: 11.2 G/DL (ref 11.5–15.4)
MCH RBC QN AUTO: 31.5 PG (ref 26.8–34.3)
MCHC RBC AUTO-ENTMCNC: 32.4 G/DL (ref 31.4–37.4)
MCV RBC AUTO: 98 FL (ref 82–98)
MRSA NOSE QL CULT: NORMAL
PLATELET # BLD AUTO: 161 THOUSANDS/UL (ref 149–390)
PMV BLD AUTO: 11.8 FL (ref 8.9–12.7)
POTASSIUM SERPL-SCNC: 4 MMOL/L (ref 3.5–5.3)
RBC # BLD AUTO: 3.55 MILLION/UL (ref 3.81–5.12)
SODIUM SERPL-SCNC: 138 MMOL/L (ref 136–145)
WBC # BLD AUTO: 6.9 THOUSAND/UL (ref 4.31–10.16)

## 2020-12-05 PROCEDURE — 99232 SBSQ HOSP IP/OBS MODERATE 35: CPT | Performed by: INTERNAL MEDICINE

## 2020-12-05 PROCEDURE — 71046 X-RAY EXAM CHEST 2 VIEWS: CPT

## 2020-12-05 PROCEDURE — 85027 COMPLETE CBC AUTOMATED: CPT | Performed by: PHYSICIAN ASSISTANT

## 2020-12-05 PROCEDURE — 80048 BASIC METABOLIC PNL TOTAL CA: CPT | Performed by: PHYSICIAN ASSISTANT

## 2020-12-05 RX ORDER — CEFAZOLIN SODIUM 2 G/50ML
2000 SOLUTION INTRAVENOUS EVERY 12 HOURS
Status: DISCONTINUED | OUTPATIENT
Start: 2020-12-05 | End: 2020-12-11 | Stop reason: HOSPADM

## 2020-12-05 RX ADMIN — FAMOTIDINE 10 MG: 20 TABLET, FILM COATED ORAL at 10:03

## 2020-12-05 RX ADMIN — Medication 125 MG: at 14:24

## 2020-12-05 RX ADMIN — HEPARIN SODIUM 5000 UNITS: 5000 INJECTION INTRAVENOUS; SUBCUTANEOUS at 06:27

## 2020-12-05 RX ADMIN — ATORVASTATIN CALCIUM 40 MG: 40 TABLET, FILM COATED ORAL at 18:12

## 2020-12-05 RX ADMIN — CEFEPIME HYDROCHLORIDE 2000 MG: 2 INJECTION, SOLUTION INTRAVENOUS at 00:18

## 2020-12-05 RX ADMIN — SODIUM CHLORIDE 250 ML: 0.9 INJECTION, SOLUTION INTRAVENOUS at 11:07

## 2020-12-05 RX ADMIN — MEMANTINE 10 MG: 5 TABLET ORAL at 21:11

## 2020-12-05 RX ADMIN — CEFAZOLIN SODIUM 2000 MG: 2 SOLUTION INTRAVENOUS at 14:24

## 2020-12-05 RX ADMIN — CARBIDOPA AND LEVODOPA 1 TABLET: 25; 100 TABLET ORAL at 10:04

## 2020-12-05 RX ADMIN — DOCUSATE SODIUM 100 MG: 100 CAPSULE ORAL at 10:04

## 2020-12-05 RX ADMIN — CARBIDOPA AND LEVODOPA 1 TABLET: 25; 100 TABLET ORAL at 18:12

## 2020-12-05 RX ADMIN — DOCUSATE SODIUM 100 MG: 100 CAPSULE ORAL at 18:12

## 2020-12-05 RX ADMIN — MEMANTINE 10 MG: 5 TABLET ORAL at 10:02

## 2020-12-05 RX ADMIN — CITALOPRAM HYDROBROMIDE 10 MG: 10 TABLET ORAL at 10:04

## 2020-12-05 RX ADMIN — HEPARIN SODIUM 5000 UNITS: 5000 INJECTION INTRAVENOUS; SUBCUTANEOUS at 21:11

## 2020-12-05 RX ADMIN — HEPARIN SODIUM 5000 UNITS: 5000 INJECTION INTRAVENOUS; SUBCUTANEOUS at 14:24

## 2020-12-05 RX ADMIN — CARBIDOPA AND LEVODOPA 1 TABLET: 25; 100 TABLET ORAL at 21:11

## 2020-12-05 RX ADMIN — Medication 125 MG: at 22:12

## 2020-12-05 RX ADMIN — FERROUS GLUCONATE TAB 324 MG (37.5 MG ELEMENTAL IRON) 324 MG: 324 (37.5 FE) TAB at 10:12

## 2020-12-06 PROCEDURE — 99232 SBSQ HOSP IP/OBS MODERATE 35: CPT | Performed by: INTERNAL MEDICINE

## 2020-12-06 RX ORDER — SODIUM CHLORIDE 9 MG/ML
50 INJECTION, SOLUTION INTRAVENOUS CONTINUOUS
Status: DISCONTINUED | OUTPATIENT
Start: 2020-12-07 | End: 2020-12-08

## 2020-12-06 RX ADMIN — Medication 125 MG: at 09:21

## 2020-12-06 RX ADMIN — CEFAZOLIN SODIUM 2000 MG: 2 SOLUTION INTRAVENOUS at 00:15

## 2020-12-06 RX ADMIN — HEPARIN SODIUM 5000 UNITS: 5000 INJECTION INTRAVENOUS; SUBCUTANEOUS at 05:25

## 2020-12-06 RX ADMIN — MEMANTINE 10 MG: 5 TABLET ORAL at 21:01

## 2020-12-06 RX ADMIN — CARBIDOPA AND LEVODOPA 1 TABLET: 25; 100 TABLET ORAL at 21:01

## 2020-12-06 RX ADMIN — FAMOTIDINE 10 MG: 20 TABLET, FILM COATED ORAL at 09:21

## 2020-12-06 RX ADMIN — CEFAZOLIN SODIUM 2000 MG: 2 SOLUTION INTRAVENOUS at 13:06

## 2020-12-06 RX ADMIN — ASPIRIN 81 MG: 81 TABLET, COATED ORAL at 09:21

## 2020-12-06 RX ADMIN — HEPARIN SODIUM 5000 UNITS: 5000 INJECTION INTRAVENOUS; SUBCUTANEOUS at 21:01

## 2020-12-06 RX ADMIN — HEPARIN SODIUM 5000 UNITS: 5000 INJECTION INTRAVENOUS; SUBCUTANEOUS at 14:55

## 2020-12-06 RX ADMIN — DOCUSATE SODIUM 100 MG: 100 CAPSULE ORAL at 17:22

## 2020-12-06 RX ADMIN — CARBIDOPA AND LEVODOPA 1 TABLET: 25; 100 TABLET ORAL at 17:22

## 2020-12-06 RX ADMIN — CARBIDOPA AND LEVODOPA 1 TABLET: 25; 100 TABLET ORAL at 09:21

## 2020-12-06 RX ADMIN — MEMANTINE 10 MG: 5 TABLET ORAL at 09:21

## 2020-12-06 RX ADMIN — Medication 125 MG: at 21:01

## 2020-12-06 RX ADMIN — ATORVASTATIN CALCIUM 40 MG: 40 TABLET, FILM COATED ORAL at 17:22

## 2020-12-06 RX ADMIN — FERROUS GLUCONATE TAB 324 MG (37.5 MG ELEMENTAL IRON) 324 MG: 324 (37.5 FE) TAB at 09:21

## 2020-12-06 RX ADMIN — CITALOPRAM HYDROBROMIDE 10 MG: 10 TABLET ORAL at 09:21

## 2020-12-06 RX ADMIN — DOCUSATE SODIUM 100 MG: 100 CAPSULE ORAL at 09:21

## 2020-12-07 ENCOUNTER — APPOINTMENT (INPATIENT)
Dept: INTERVENTIONAL RADIOLOGY/VASCULAR | Facility: HOSPITAL | Age: 85
DRG: 853 | End: 2020-12-07
Attending: RADIOLOGY
Payer: MEDICARE

## 2020-12-07 LAB
ANION GAP SERPL CALCULATED.3IONS-SCNC: 7 MMOL/L (ref 4–13)
BUN SERPL-MCNC: 19 MG/DL (ref 5–25)
CALCIUM SERPL-MCNC: 8.4 MG/DL (ref 8.3–10.1)
CHLORIDE SERPL-SCNC: 108 MMOL/L (ref 100–108)
CO2 SERPL-SCNC: 27 MMOL/L (ref 21–32)
CREAT SERPL-MCNC: 1.06 MG/DL (ref 0.6–1.3)
ERYTHROCYTE [DISTWIDTH] IN BLOOD BY AUTOMATED COUNT: 13 % (ref 11.6–15.1)
GFR SERPL CREATININE-BSD FRML MDRD: 44 ML/MIN/1.73SQ M
GLUCOSE SERPL-MCNC: 91 MG/DL (ref 65–140)
HCT VFR BLD AUTO: 35.1 % (ref 34.8–46.1)
HGB BLD-MCNC: 11.3 G/DL (ref 11.5–15.4)
INR PPP: 1.14 (ref 0.84–1.19)
MCH RBC QN AUTO: 31.6 PG (ref 26.8–34.3)
MCHC RBC AUTO-ENTMCNC: 32.2 G/DL (ref 31.4–37.4)
MCV RBC AUTO: 98 FL (ref 82–98)
PLATELET # BLD AUTO: 160 THOUSANDS/UL (ref 149–390)
PMV BLD AUTO: 11.8 FL (ref 8.9–12.7)
POTASSIUM SERPL-SCNC: 4.1 MMOL/L (ref 3.5–5.3)
PROTHROMBIN TIME: 14.6 SECONDS (ref 11.6–14.5)
RBC # BLD AUTO: 3.58 MILLION/UL (ref 3.81–5.12)
SODIUM SERPL-SCNC: 142 MMOL/L (ref 136–145)
WBC # BLD AUTO: 6.14 THOUSAND/UL (ref 4.31–10.16)

## 2020-12-07 PROCEDURE — C2623 CATH, TRANSLUMIN, DRUG-COAT: HCPCS

## 2020-12-07 PROCEDURE — 75710 ARTERY X-RAYS ARM/LEG: CPT | Performed by: RADIOLOGY

## 2020-12-07 PROCEDURE — 76937 US GUIDE VASCULAR ACCESS: CPT | Performed by: RADIOLOGY

## 2020-12-07 PROCEDURE — 37228 HB TIB/PER REVASC W/TLA: CPT

## 2020-12-07 PROCEDURE — 99232 SBSQ HOSP IP/OBS MODERATE 35: CPT | Performed by: INTERNAL MEDICINE

## 2020-12-07 PROCEDURE — 75625 CONTRAST EXAM ABDOMINL AORTA: CPT | Performed by: RADIOLOGY

## 2020-12-07 PROCEDURE — C1769 GUIDE WIRE: HCPCS

## 2020-12-07 PROCEDURE — C1725 CATH, TRANSLUMIN NON-LASER: HCPCS

## 2020-12-07 PROCEDURE — C1760 CLOSURE DEV, VASC: HCPCS

## 2020-12-07 PROCEDURE — 047L3Z1 DILATION OF LEFT FEMORAL ARTERY USING DRUG-COATED BALLOON, PERCUTANEOUS APPROACH: ICD-10-PCS | Performed by: RADIOLOGY

## 2020-12-07 PROCEDURE — 37224 HB FEM/POPL REVAS W/TLA: CPT

## 2020-12-07 PROCEDURE — C1766 INTRO/SHEATH,STRBLE,NON-PEEL: HCPCS

## 2020-12-07 PROCEDURE — 80048 BASIC METABOLIC PNL TOTAL CA: CPT | Performed by: PHYSICIAN ASSISTANT

## 2020-12-07 PROCEDURE — NC001 PR NO CHARGE: Performed by: RADIOLOGY

## 2020-12-07 PROCEDURE — 75625 CONTRAST EXAM ABDOMINL AORTA: CPT

## 2020-12-07 PROCEDURE — 37228 PR REVASCULARIZE TIBIAL/PERON ARTERY,ANGIOPLASTY INITIAL: CPT | Performed by: RADIOLOGY

## 2020-12-07 PROCEDURE — 85027 COMPLETE CBC AUTOMATED: CPT | Performed by: PHYSICIAN ASSISTANT

## 2020-12-07 PROCEDURE — C1887 CATHETER, GUIDING: HCPCS

## 2020-12-07 PROCEDURE — 75710 ARTERY X-RAYS ARM/LEG: CPT

## 2020-12-07 PROCEDURE — 76937 US GUIDE VASCULAR ACCESS: CPT

## 2020-12-07 PROCEDURE — 37224 PR REVSC OPN/PRG FEM/POP W/ANGIOPLASTY UNI: CPT | Performed by: RADIOLOGY

## 2020-12-07 PROCEDURE — 047N3Z1 DILATION OF LEFT POPLITEAL ARTERY USING DRUG-COATED BALLOON, PERCUTANEOUS APPROACH: ICD-10-PCS | Performed by: RADIOLOGY

## 2020-12-07 PROCEDURE — C1894 INTRO/SHEATH, NON-LASER: HCPCS

## 2020-12-07 PROCEDURE — 85610 PROTHROMBIN TIME: CPT | Performed by: PHYSICIAN ASSISTANT

## 2020-12-07 RX ORDER — FENTANYL CITRATE 50 UG/ML
INJECTION, SOLUTION INTRAMUSCULAR; INTRAVENOUS CODE/TRAUMA/SEDATION MEDICATION
Status: COMPLETED | OUTPATIENT
Start: 2020-12-07 | End: 2020-12-07

## 2020-12-07 RX ORDER — HEPARIN SODIUM 1000 [USP'U]/ML
INJECTION, SOLUTION INTRAVENOUS; SUBCUTANEOUS CODE/TRAUMA/SEDATION MEDICATION
Status: COMPLETED | OUTPATIENT
Start: 2020-12-07 | End: 2020-12-07

## 2020-12-07 RX ADMIN — FENTANYL CITRATE 12.5 MCG: 50 INJECTION, SOLUTION INTRAMUSCULAR; INTRAVENOUS at 16:30

## 2020-12-07 RX ADMIN — FENTANYL CITRATE 25 MCG: 50 INJECTION, SOLUTION INTRAMUSCULAR; INTRAVENOUS at 15:54

## 2020-12-07 RX ADMIN — HEPARIN SODIUM 5000 UNITS: 5000 INJECTION INTRAVENOUS; SUBCUTANEOUS at 06:03

## 2020-12-07 RX ADMIN — Medication 125 MG: at 21:16

## 2020-12-07 RX ADMIN — SODIUM CHLORIDE 50 ML/HR: 0.9 INJECTION, SOLUTION INTRAVENOUS at 00:30

## 2020-12-07 RX ADMIN — ATORVASTATIN CALCIUM 40 MG: 40 TABLET, FILM COATED ORAL at 21:16

## 2020-12-07 RX ADMIN — MEMANTINE 10 MG: 5 TABLET ORAL at 10:19

## 2020-12-07 RX ADMIN — CEFAZOLIN SODIUM 2000 MG: 2 SOLUTION INTRAVENOUS at 00:31

## 2020-12-07 RX ADMIN — HEPARIN SODIUM 5000 UNITS: 5000 INJECTION INTRAVENOUS; SUBCUTANEOUS at 21:16

## 2020-12-07 RX ADMIN — FENTANYL CITRATE 12.5 MCG: 50 INJECTION, SOLUTION INTRAMUSCULAR; INTRAVENOUS at 15:26

## 2020-12-07 RX ADMIN — DOCUSATE SODIUM 100 MG: 100 CAPSULE ORAL at 10:19

## 2020-12-07 RX ADMIN — DIPHENHYDRAMINE HYDROCHLORIDE 25 MG: 50 INJECTION, SOLUTION INTRAMUSCULAR; INTRAVENOUS at 14:24

## 2020-12-07 RX ADMIN — CITALOPRAM HYDROBROMIDE 10 MG: 10 TABLET ORAL at 10:19

## 2020-12-07 RX ADMIN — MEMANTINE 10 MG: 5 TABLET ORAL at 21:17

## 2020-12-07 RX ADMIN — FAMOTIDINE 10 MG: 20 TABLET, FILM COATED ORAL at 10:19

## 2020-12-07 RX ADMIN — CARBIDOPA AND LEVODOPA 1 TABLET: 25; 100 TABLET ORAL at 21:17

## 2020-12-07 RX ADMIN — CARBIDOPA AND LEVODOPA 1 TABLET: 25; 100 TABLET ORAL at 10:19

## 2020-12-07 RX ADMIN — Medication 125 MG: at 10:20

## 2020-12-07 RX ADMIN — DIPHENHYDRAMINE HYDROCHLORIDE 25 MG: 50 INJECTION, SOLUTION INTRAMUSCULAR; INTRAVENOUS at 15:45

## 2020-12-07 RX ADMIN — FENTANYL CITRATE 25 MCG: 50 INJECTION, SOLUTION INTRAMUSCULAR; INTRAVENOUS at 16:54

## 2020-12-07 RX ADMIN — FERROUS GLUCONATE TAB 324 MG (37.5 MG ELEMENTAL IRON) 324 MG: 324 (37.5 FE) TAB at 10:20

## 2020-12-07 RX ADMIN — HEPARIN SODIUM 3000 UNITS: 1000 INJECTION INTRAVENOUS; SUBCUTANEOUS at 15:38

## 2020-12-07 RX ADMIN — FENTANYL CITRATE 25 MCG: 50 INJECTION, SOLUTION INTRAMUSCULAR; INTRAVENOUS at 14:24

## 2020-12-08 ENCOUNTER — APPOINTMENT (INPATIENT)
Dept: NON INVASIVE DIAGNOSTICS | Facility: HOSPITAL | Age: 85
DRG: 853 | End: 2020-12-08
Payer: MEDICARE

## 2020-12-08 LAB
ANION GAP SERPL CALCULATED.3IONS-SCNC: 6 MMOL/L (ref 4–13)
BACTERIA BLD CULT: NORMAL
BACTERIA BLD CULT: NORMAL
BASOPHILS # BLD AUTO: 0.07 THOUSANDS/ΜL (ref 0–0.1)
BASOPHILS NFR BLD AUTO: 1 % (ref 0–1)
BUN SERPL-MCNC: 14 MG/DL (ref 5–25)
CALCIUM SERPL-MCNC: 8.2 MG/DL (ref 8.3–10.1)
CHLORIDE SERPL-SCNC: 108 MMOL/L (ref 100–108)
CO2 SERPL-SCNC: 27 MMOL/L (ref 21–32)
CREAT SERPL-MCNC: 0.93 MG/DL (ref 0.6–1.3)
EOSINOPHIL # BLD AUTO: 0.46 THOUSAND/ΜL (ref 0–0.61)
EOSINOPHIL NFR BLD AUTO: 5 % (ref 0–6)
ERYTHROCYTE [DISTWIDTH] IN BLOOD BY AUTOMATED COUNT: 12.9 % (ref 11.6–15.1)
GFR SERPL CREATININE-BSD FRML MDRD: 52 ML/MIN/1.73SQ M
GLUCOSE SERPL-MCNC: 74 MG/DL (ref 65–140)
HCT VFR BLD AUTO: 36.9 % (ref 34.8–46.1)
HGB BLD-MCNC: 11.8 G/DL (ref 11.5–15.4)
IMM GRANULOCYTES # BLD AUTO: 0.05 THOUSAND/UL (ref 0–0.2)
IMM GRANULOCYTES NFR BLD AUTO: 1 % (ref 0–2)
LYMPHOCYTES # BLD AUTO: 1.81 THOUSANDS/ΜL (ref 0.6–4.47)
LYMPHOCYTES NFR BLD AUTO: 20 % (ref 14–44)
MAGNESIUM SERPL-MCNC: 2.2 MG/DL (ref 1.6–2.6)
MCH RBC QN AUTO: 32.4 PG (ref 26.8–34.3)
MCHC RBC AUTO-ENTMCNC: 32 G/DL (ref 31.4–37.4)
MCV RBC AUTO: 101 FL (ref 82–98)
MONOCYTES # BLD AUTO: 1.2 THOUSAND/ΜL (ref 0.17–1.22)
MONOCYTES NFR BLD AUTO: 13 % (ref 4–12)
NEUTROPHILS # BLD AUTO: 5.59 THOUSANDS/ΜL (ref 1.85–7.62)
NEUTS SEG NFR BLD AUTO: 60 % (ref 43–75)
NRBC BLD AUTO-RTO: 0 /100 WBCS
PLATELET # BLD AUTO: 171 THOUSANDS/UL (ref 149–390)
PMV BLD AUTO: 11.6 FL (ref 8.9–12.7)
POTASSIUM SERPL-SCNC: 4.1 MMOL/L (ref 3.5–5.3)
RBC # BLD AUTO: 3.64 MILLION/UL (ref 3.81–5.12)
SODIUM SERPL-SCNC: 141 MMOL/L (ref 136–145)
WBC # BLD AUTO: 9.18 THOUSAND/UL (ref 4.31–10.16)

## 2020-12-08 PROCEDURE — 92526 ORAL FUNCTION THERAPY: CPT

## 2020-12-08 PROCEDURE — 93923 UPR/LXTR ART STDY 3+ LVLS: CPT | Performed by: INTERNAL MEDICINE

## 2020-12-08 PROCEDURE — 93926 LOWER EXTREMITY STUDY: CPT

## 2020-12-08 PROCEDURE — 99232 SBSQ HOSP IP/OBS MODERATE 35: CPT | Performed by: PHYSICIAN ASSISTANT

## 2020-12-08 PROCEDURE — 94760 N-INVAS EAR/PLS OXIMETRY 1: CPT

## 2020-12-08 PROCEDURE — 85025 COMPLETE CBC W/AUTO DIFF WBC: CPT | Performed by: INTERNAL MEDICINE

## 2020-12-08 PROCEDURE — 83735 ASSAY OF MAGNESIUM: CPT | Performed by: INTERNAL MEDICINE

## 2020-12-08 PROCEDURE — 80048 BASIC METABOLIC PNL TOTAL CA: CPT | Performed by: INTERNAL MEDICINE

## 2020-12-08 PROCEDURE — 99232 SBSQ HOSP IP/OBS MODERATE 35: CPT | Performed by: INTERNAL MEDICINE

## 2020-12-08 PROCEDURE — 93926 LOWER EXTREMITY STUDY: CPT | Performed by: INTERNAL MEDICINE

## 2020-12-08 RX ORDER — SODIUM CHLORIDE 9 MG/ML
50 INJECTION, SOLUTION INTRAVENOUS CONTINUOUS
Status: DISCONTINUED | OUTPATIENT
Start: 2020-12-08 | End: 2020-12-09

## 2020-12-08 RX ADMIN — ATORVASTATIN CALCIUM 40 MG: 40 TABLET, FILM COATED ORAL at 17:37

## 2020-12-08 RX ADMIN — CEFAZOLIN SODIUM 2000 MG: 2 SOLUTION INTRAVENOUS at 00:27

## 2020-12-08 RX ADMIN — SODIUM CHLORIDE 50 ML/HR: 0.9 INJECTION, SOLUTION INTRAVENOUS at 14:46

## 2020-12-08 RX ADMIN — SODIUM CHLORIDE 50 ML/HR: 0.9 INJECTION, SOLUTION INTRAVENOUS at 00:28

## 2020-12-08 RX ADMIN — CARBIDOPA AND LEVODOPA 1 TABLET: 25; 100 TABLET ORAL at 23:22

## 2020-12-08 RX ADMIN — MEMANTINE 10 MG: 5 TABLET ORAL at 08:11

## 2020-12-08 RX ADMIN — Medication 125 MG: at 23:21

## 2020-12-08 RX ADMIN — HEPARIN SODIUM 5000 UNITS: 5000 INJECTION INTRAVENOUS; SUBCUTANEOUS at 05:43

## 2020-12-08 RX ADMIN — HEPARIN SODIUM 5000 UNITS: 5000 INJECTION INTRAVENOUS; SUBCUTANEOUS at 23:21

## 2020-12-08 RX ADMIN — CARBIDOPA AND LEVODOPA 1 TABLET: 25; 100 TABLET ORAL at 08:10

## 2020-12-08 RX ADMIN — FAMOTIDINE 10 MG: 20 TABLET, FILM COATED ORAL at 08:10

## 2020-12-08 RX ADMIN — MEMANTINE 10 MG: 5 TABLET ORAL at 23:21

## 2020-12-08 RX ADMIN — ASPIRIN 81 MG: 81 TABLET, COATED ORAL at 08:10

## 2020-12-08 RX ADMIN — CARBIDOPA AND LEVODOPA 1 TABLET: 25; 100 TABLET ORAL at 17:37

## 2020-12-08 RX ADMIN — DOCUSATE SODIUM 100 MG: 100 CAPSULE ORAL at 17:37

## 2020-12-08 RX ADMIN — FERROUS GLUCONATE TAB 324 MG (37.5 MG ELEMENTAL IRON) 324 MG: 324 (37.5 FE) TAB at 08:11

## 2020-12-08 RX ADMIN — DOCUSATE SODIUM 100 MG: 100 CAPSULE ORAL at 08:10

## 2020-12-08 RX ADMIN — CEFAZOLIN SODIUM 2000 MG: 2 SOLUTION INTRAVENOUS at 13:49

## 2020-12-08 RX ADMIN — Medication 125 MG: at 08:10

## 2020-12-08 RX ADMIN — CITALOPRAM HYDROBROMIDE 10 MG: 10 TABLET ORAL at 08:11

## 2020-12-08 RX ADMIN — HEPARIN SODIUM 5000 UNITS: 5000 INJECTION INTRAVENOUS; SUBCUTANEOUS at 13:49

## 2020-12-09 LAB
ANION GAP SERPL CALCULATED.3IONS-SCNC: 7 MMOL/L (ref 4–13)
BUN SERPL-MCNC: 14 MG/DL (ref 5–25)
CALCIUM SERPL-MCNC: 7.9 MG/DL (ref 8.3–10.1)
CHLORIDE SERPL-SCNC: 108 MMOL/L (ref 100–108)
CO2 SERPL-SCNC: 25 MMOL/L (ref 21–32)
CREAT SERPL-MCNC: 1.05 MG/DL (ref 0.6–1.3)
GFR SERPL CREATININE-BSD FRML MDRD: 45 ML/MIN/1.73SQ M
GLUCOSE SERPL-MCNC: 86 MG/DL (ref 65–140)
POTASSIUM SERPL-SCNC: 3.8 MMOL/L (ref 3.5–5.3)
SODIUM SERPL-SCNC: 140 MMOL/L (ref 136–145)

## 2020-12-09 PROCEDURE — 99232 SBSQ HOSP IP/OBS MODERATE 35: CPT | Performed by: PHYSICIAN ASSISTANT

## 2020-12-09 PROCEDURE — 99232 SBSQ HOSP IP/OBS MODERATE 35: CPT | Performed by: INTERNAL MEDICINE

## 2020-12-09 PROCEDURE — 80048 BASIC METABOLIC PNL TOTAL CA: CPT | Performed by: INTERNAL MEDICINE

## 2020-12-09 RX ADMIN — CARBIDOPA AND LEVODOPA 1 TABLET: 25; 100 TABLET ORAL at 11:05

## 2020-12-09 RX ADMIN — CARBIDOPA AND LEVODOPA 1 TABLET: 25; 100 TABLET ORAL at 18:09

## 2020-12-09 RX ADMIN — HEPARIN SODIUM 5000 UNITS: 5000 INJECTION INTRAVENOUS; SUBCUTANEOUS at 14:00

## 2020-12-09 RX ADMIN — DOCUSATE SODIUM 100 MG: 100 CAPSULE ORAL at 18:08

## 2020-12-09 RX ADMIN — Medication 125 MG: at 11:12

## 2020-12-09 RX ADMIN — HEPARIN SODIUM 5000 UNITS: 5000 INJECTION INTRAVENOUS; SUBCUTANEOUS at 05:29

## 2020-12-09 RX ADMIN — CEFAZOLIN SODIUM 2000 MG: 2 SOLUTION INTRAVENOUS at 00:34

## 2020-12-09 RX ADMIN — DOCUSATE SODIUM 100 MG: 100 CAPSULE ORAL at 11:06

## 2020-12-09 RX ADMIN — MEMANTINE 10 MG: 5 TABLET ORAL at 20:30

## 2020-12-09 RX ADMIN — FERROUS GLUCONATE TAB 324 MG (37.5 MG ELEMENTAL IRON) 324 MG: 324 (37.5 FE) TAB at 11:05

## 2020-12-09 RX ADMIN — MEMANTINE 10 MG: 5 TABLET ORAL at 11:05

## 2020-12-09 RX ADMIN — CEFAZOLIN SODIUM 2000 MG: 2 SOLUTION INTRAVENOUS at 12:09

## 2020-12-09 RX ADMIN — ATORVASTATIN CALCIUM 40 MG: 40 TABLET, FILM COATED ORAL at 18:09

## 2020-12-09 RX ADMIN — CEFAZOLIN SODIUM 2000 MG: 2 SOLUTION INTRAVENOUS at 23:56

## 2020-12-09 RX ADMIN — CITALOPRAM HYDROBROMIDE 10 MG: 10 TABLET ORAL at 11:04

## 2020-12-09 RX ADMIN — FAMOTIDINE 10 MG: 20 TABLET, FILM COATED ORAL at 11:04

## 2020-12-09 RX ADMIN — Medication 125 MG: at 20:30

## 2020-12-09 RX ADMIN — HEPARIN SODIUM 5000 UNITS: 5000 INJECTION INTRAVENOUS; SUBCUTANEOUS at 21:46

## 2020-12-09 RX ADMIN — CARBIDOPA AND LEVODOPA 1 TABLET: 25; 100 TABLET ORAL at 20:30

## 2020-12-10 ENCOUNTER — APPOINTMENT (INPATIENT)
Dept: INTERVENTIONAL RADIOLOGY/VASCULAR | Facility: HOSPITAL | Age: 85
DRG: 853 | End: 2020-12-10
Attending: RADIOLOGY
Payer: MEDICARE

## 2020-12-10 LAB
ANION GAP SERPL CALCULATED.3IONS-SCNC: 8 MMOL/L (ref 4–13)
BASOPHILS # BLD AUTO: 0.04 THOUSANDS/ΜL (ref 0–0.1)
BASOPHILS NFR BLD AUTO: 0 % (ref 0–1)
BUN SERPL-MCNC: 18 MG/DL (ref 5–25)
CALCIUM SERPL-MCNC: 8.2 MG/DL (ref 8.3–10.1)
CHLORIDE SERPL-SCNC: 108 MMOL/L (ref 100–108)
CO2 SERPL-SCNC: 26 MMOL/L (ref 21–32)
CREAT SERPL-MCNC: 1.03 MG/DL (ref 0.6–1.3)
EOSINOPHIL # BLD AUTO: 0.46 THOUSAND/ΜL (ref 0–0.61)
EOSINOPHIL NFR BLD AUTO: 5 % (ref 0–6)
ERYTHROCYTE [DISTWIDTH] IN BLOOD BY AUTOMATED COUNT: 13.3 % (ref 11.6–15.1)
GFR SERPL CREATININE-BSD FRML MDRD: 46 ML/MIN/1.73SQ M
GLUCOSE SERPL-MCNC: 95 MG/DL (ref 65–140)
HCT VFR BLD AUTO: 33.3 % (ref 34.8–46.1)
HGB BLD-MCNC: 10.8 G/DL (ref 11.5–15.4)
IMM GRANULOCYTES # BLD AUTO: 0.06 THOUSAND/UL (ref 0–0.2)
IMM GRANULOCYTES NFR BLD AUTO: 1 % (ref 0–2)
LYMPHOCYTES # BLD AUTO: 1.88 THOUSANDS/ΜL (ref 0.6–4.47)
LYMPHOCYTES NFR BLD AUTO: 18 % (ref 14–44)
MCH RBC QN AUTO: 32.6 PG (ref 26.8–34.3)
MCHC RBC AUTO-ENTMCNC: 32.4 G/DL (ref 31.4–37.4)
MCV RBC AUTO: 101 FL (ref 82–98)
MONOCYTES # BLD AUTO: 1.4 THOUSAND/ΜL (ref 0.17–1.22)
MONOCYTES NFR BLD AUTO: 14 % (ref 4–12)
NEUTROPHILS # BLD AUTO: 6.41 THOUSANDS/ΜL (ref 1.85–7.62)
NEUTS SEG NFR BLD AUTO: 62 % (ref 43–75)
NRBC BLD AUTO-RTO: 0 /100 WBCS
PLATELET # BLD AUTO: 190 THOUSANDS/UL (ref 149–390)
PMV BLD AUTO: 12.5 FL (ref 8.9–12.7)
POTASSIUM SERPL-SCNC: 4.4 MMOL/L (ref 3.5–5.3)
RBC # BLD AUTO: 3.31 MILLION/UL (ref 3.81–5.12)
SODIUM SERPL-SCNC: 142 MMOL/L (ref 136–145)
WBC # BLD AUTO: 10.25 THOUSAND/UL (ref 4.31–10.16)

## 2020-12-10 PROCEDURE — 77001 FLUOROGUIDE FOR VEIN DEVICE: CPT

## 2020-12-10 PROCEDURE — 99232 SBSQ HOSP IP/OBS MODERATE 35: CPT | Performed by: INTERNAL MEDICINE

## 2020-12-10 PROCEDURE — 36573 INSJ PICC RS&I 5 YR+: CPT

## 2020-12-10 PROCEDURE — 80048 BASIC METABOLIC PNL TOTAL CA: CPT | Performed by: INTERNAL MEDICINE

## 2020-12-10 PROCEDURE — 02HV33Z INSERTION OF INFUSION DEVICE INTO SUPERIOR VENA CAVA, PERCUTANEOUS APPROACH: ICD-10-PCS | Performed by: RADIOLOGY

## 2020-12-10 PROCEDURE — 76937 US GUIDE VASCULAR ACCESS: CPT

## 2020-12-10 PROCEDURE — 85025 COMPLETE CBC W/AUTO DIFF WBC: CPT | Performed by: INTERNAL MEDICINE

## 2020-12-10 RX ORDER — CEFAZOLIN SODIUM 2 G/50ML
2000 SOLUTION INTRAVENOUS EVERY 12 HOURS
Qty: 10 EACH | Refills: 0 | Status: SHIPPED
Start: 2020-12-11 | End: 2020-12-16

## 2020-12-10 RX ORDER — CEFAZOLIN SODIUM 2 G/50ML
2000 SOLUTION INTRAVENOUS EVERY 12 HOURS
Qty: 10 EACH | Refills: 0
Start: 2020-12-11 | End: 2020-12-10

## 2020-12-10 RX ORDER — FUROSEMIDE 20 MG/1
20 TABLET ORAL DAILY
Status: DISCONTINUED | OUTPATIENT
Start: 2020-12-10 | End: 2020-12-11 | Stop reason: HOSPADM

## 2020-12-10 RX ADMIN — Medication 125 MG: at 21:49

## 2020-12-10 RX ADMIN — FERROUS GLUCONATE TAB 324 MG (37.5 MG ELEMENTAL IRON) 324 MG: 324 (37.5 FE) TAB at 10:32

## 2020-12-10 RX ADMIN — CITALOPRAM HYDROBROMIDE 10 MG: 10 TABLET ORAL at 10:32

## 2020-12-10 RX ADMIN — MEMANTINE 10 MG: 5 TABLET ORAL at 21:49

## 2020-12-10 RX ADMIN — FUROSEMIDE 20 MG: 20 TABLET ORAL at 10:43

## 2020-12-10 RX ADMIN — CARBIDOPA AND LEVODOPA 1 TABLET: 25; 100 TABLET ORAL at 10:43

## 2020-12-10 RX ADMIN — DOCUSATE SODIUM 100 MG: 100 CAPSULE ORAL at 19:14

## 2020-12-10 RX ADMIN — ASPIRIN 81 MG: 81 TABLET, COATED ORAL at 10:32

## 2020-12-10 RX ADMIN — FAMOTIDINE 10 MG: 20 TABLET, FILM COATED ORAL at 10:32

## 2020-12-10 RX ADMIN — HEPARIN SODIUM 5000 UNITS: 5000 INJECTION INTRAVENOUS; SUBCUTANEOUS at 14:29

## 2020-12-10 RX ADMIN — Medication 125 MG: at 10:43

## 2020-12-10 RX ADMIN — HEPARIN SODIUM 5000 UNITS: 5000 INJECTION INTRAVENOUS; SUBCUTANEOUS at 05:22

## 2020-12-10 RX ADMIN — DOCUSATE SODIUM 100 MG: 100 CAPSULE ORAL at 10:32

## 2020-12-10 RX ADMIN — CARBIDOPA AND LEVODOPA 1 TABLET: 25; 100 TABLET ORAL at 19:14

## 2020-12-10 RX ADMIN — HEPARIN SODIUM 5000 UNITS: 5000 INJECTION INTRAVENOUS; SUBCUTANEOUS at 21:49

## 2020-12-10 RX ADMIN — CEFAZOLIN SODIUM 2000 MG: 2 SOLUTION INTRAVENOUS at 14:29

## 2020-12-10 RX ADMIN — ATORVASTATIN CALCIUM 40 MG: 40 TABLET, FILM COATED ORAL at 19:14

## 2020-12-10 RX ADMIN — MEMANTINE 10 MG: 5 TABLET ORAL at 10:32

## 2020-12-11 VITALS
SYSTOLIC BLOOD PRESSURE: 124 MMHG | WEIGHT: 136.69 LBS | HEART RATE: 65 BPM | TEMPERATURE: 98.6 F | OXYGEN SATURATION: 94 % | HEIGHT: 60 IN | DIASTOLIC BLOOD PRESSURE: 57 MMHG | RESPIRATION RATE: 20 BRPM | BODY MASS INDEX: 26.84 KG/M2

## 2020-12-11 PROBLEM — A41.9 SEPTIC SHOCK (HCC): Status: RESOLVED | Noted: 2020-12-02 | Resolved: 2020-12-11

## 2020-12-11 PROBLEM — R65.21 SEPTIC SHOCK (HCC): Status: RESOLVED | Noted: 2020-12-02 | Resolved: 2020-12-11

## 2020-12-11 PROBLEM — N18.9 ACUTE KIDNEY INJURY SUPERIMPOSED ON CHRONIC KIDNEY DISEASE (HCC): Status: RESOLVED | Noted: 2018-07-17 | Resolved: 2020-12-11

## 2020-12-11 PROBLEM — N17.9 ACUTE KIDNEY INJURY SUPERIMPOSED ON CHRONIC KIDNEY DISEASE (HCC): Status: RESOLVED | Noted: 2018-07-17 | Resolved: 2020-12-11

## 2020-12-11 LAB
ANION GAP SERPL CALCULATED.3IONS-SCNC: 5 MMOL/L (ref 4–13)
BASOPHILS # BLD AUTO: 0.06 THOUSANDS/ΜL (ref 0–0.1)
BASOPHILS NFR BLD AUTO: 1 % (ref 0–1)
BUN SERPL-MCNC: 20 MG/DL (ref 5–25)
CALCIUM SERPL-MCNC: 8.3 MG/DL (ref 8.3–10.1)
CHLORIDE SERPL-SCNC: 105 MMOL/L (ref 100–108)
CO2 SERPL-SCNC: 29 MMOL/L (ref 21–32)
CREAT SERPL-MCNC: 1.02 MG/DL (ref 0.6–1.3)
EOSINOPHIL # BLD AUTO: 0.54 THOUSAND/ΜL (ref 0–0.61)
EOSINOPHIL NFR BLD AUTO: 6 % (ref 0–6)
ERYTHROCYTE [DISTWIDTH] IN BLOOD BY AUTOMATED COUNT: 13.2 % (ref 11.6–15.1)
FLUAV RNA RESP QL NAA+PROBE: NEGATIVE
FLUBV RNA RESP QL NAA+PROBE: NEGATIVE
GFR SERPL CREATININE-BSD FRML MDRD: 46 ML/MIN/1.73SQ M
GLUCOSE SERPL-MCNC: 89 MG/DL (ref 65–140)
HCT VFR BLD AUTO: 32.2 % (ref 34.8–46.1)
HGB BLD-MCNC: 10.2 G/DL (ref 11.5–15.4)
IMM GRANULOCYTES # BLD AUTO: 0.06 THOUSAND/UL (ref 0–0.2)
IMM GRANULOCYTES NFR BLD AUTO: 1 % (ref 0–2)
LYMPHOCYTES # BLD AUTO: 1.63 THOUSANDS/ΜL (ref 0.6–4.47)
LYMPHOCYTES NFR BLD AUTO: 19 % (ref 14–44)
MCH RBC QN AUTO: 32.2 PG (ref 26.8–34.3)
MCHC RBC AUTO-ENTMCNC: 31.7 G/DL (ref 31.4–37.4)
MCV RBC AUTO: 102 FL (ref 82–98)
MONOCYTES # BLD AUTO: 1.15 THOUSAND/ΜL (ref 0.17–1.22)
MONOCYTES NFR BLD AUTO: 13 % (ref 4–12)
NEUTROPHILS # BLD AUTO: 5.26 THOUSANDS/ΜL (ref 1.85–7.62)
NEUTS SEG NFR BLD AUTO: 60 % (ref 43–75)
NRBC BLD AUTO-RTO: 0 /100 WBCS
PLATELET # BLD AUTO: 180 THOUSANDS/UL (ref 149–390)
PMV BLD AUTO: 12.6 FL (ref 8.9–12.7)
POTASSIUM SERPL-SCNC: 4.3 MMOL/L (ref 3.5–5.3)
RBC # BLD AUTO: 3.17 MILLION/UL (ref 3.81–5.12)
RSV RNA RESP QL NAA+PROBE: NEGATIVE
SARS-COV-2 RNA RESP QL NAA+PROBE: NEGATIVE
SODIUM SERPL-SCNC: 139 MMOL/L (ref 136–145)
WBC # BLD AUTO: 8.7 THOUSAND/UL (ref 4.31–10.16)

## 2020-12-11 PROCEDURE — 80048 BASIC METABOLIC PNL TOTAL CA: CPT | Performed by: INTERNAL MEDICINE

## 2020-12-11 PROCEDURE — 0241U HB NFCT DS VIR RESP RNA 4 TRGT: CPT | Performed by: INTERNAL MEDICINE

## 2020-12-11 PROCEDURE — 99239 HOSP IP/OBS DSCHRG MGMT >30: CPT | Performed by: INTERNAL MEDICINE

## 2020-12-11 PROCEDURE — 85025 COMPLETE CBC W/AUTO DIFF WBC: CPT | Performed by: INTERNAL MEDICINE

## 2020-12-11 RX ORDER — ATORVASTATIN CALCIUM 40 MG/1
40 TABLET, FILM COATED ORAL
Refills: 0
Start: 2020-12-11

## 2020-12-11 RX ADMIN — Medication 125 MG: at 09:20

## 2020-12-11 RX ADMIN — FAMOTIDINE 10 MG: 20 TABLET, FILM COATED ORAL at 09:21

## 2020-12-11 RX ADMIN — CEFAZOLIN SODIUM 2000 MG: 2 SOLUTION INTRAVENOUS at 12:45

## 2020-12-11 RX ADMIN — FUROSEMIDE 20 MG: 20 TABLET ORAL at 09:21

## 2020-12-11 RX ADMIN — FERROUS GLUCONATE TAB 324 MG (37.5 MG ELEMENTAL IRON) 324 MG: 324 (37.5 FE) TAB at 09:20

## 2020-12-11 RX ADMIN — DOCUSATE SODIUM 100 MG: 100 CAPSULE ORAL at 09:20

## 2020-12-11 RX ADMIN — MEMANTINE 10 MG: 5 TABLET ORAL at 09:22

## 2020-12-11 RX ADMIN — CARBIDOPA AND LEVODOPA 1 TABLET: 25; 100 TABLET ORAL at 09:22

## 2020-12-11 RX ADMIN — CEFAZOLIN SODIUM 2000 MG: 2 SOLUTION INTRAVENOUS at 01:32

## 2020-12-11 RX ADMIN — CITALOPRAM HYDROBROMIDE 10 MG: 10 TABLET ORAL at 09:22

## 2020-12-11 RX ADMIN — HEPARIN SODIUM 5000 UNITS: 5000 INJECTION INTRAVENOUS; SUBCUTANEOUS at 06:19

## 2020-12-15 ENCOUNTER — EPISODE CHANGES (OUTPATIENT)
Dept: CASE MANAGEMENT | Facility: HOSPITAL | Age: 85
End: 2020-12-15

## 2020-12-23 ENCOUNTER — HOSPITAL ENCOUNTER (INPATIENT)
Facility: HOSPITAL | Age: 85
LOS: 4 days | Discharge: HOSPICE | DRG: 056 | End: 2020-12-28
Attending: EMERGENCY MEDICINE | Admitting: INTERNAL MEDICINE
Payer: MEDICARE

## 2020-12-23 DIAGNOSIS — M86.9 TOE OSTEOMYELITIS, LEFT (HCC): ICD-10-CM

## 2020-12-23 DIAGNOSIS — R45.1 AGITATION: ICD-10-CM

## 2020-12-23 DIAGNOSIS — F02.81 LATE ONSET ALZHEIMER'S DISEASE WITH BEHAVIORAL DISTURBANCE (HCC): ICD-10-CM

## 2020-12-23 DIAGNOSIS — N17.9 AKI (ACUTE KIDNEY INJURY) (HCC): Primary | ICD-10-CM

## 2020-12-23 DIAGNOSIS — R60.0 LOCALIZED EDEMA: ICD-10-CM

## 2020-12-23 DIAGNOSIS — I73.9 PERIPHERAL VASCULAR DISEASE (HCC): ICD-10-CM

## 2020-12-23 DIAGNOSIS — R65.10 SIRS (SYSTEMIC INFLAMMATORY RESPONSE SYNDROME) (HCC): ICD-10-CM

## 2020-12-23 DIAGNOSIS — G30.1 LATE ONSET ALZHEIMER'S DISEASE WITH BEHAVIORAL DISTURBANCE (HCC): ICD-10-CM

## 2020-12-23 LAB
ALBUMIN SERPL BCP-MCNC: 3.5 G/DL (ref 3.5–5)
ALP SERPL-CCNC: 85 U/L (ref 46–116)
ALT SERPL W P-5'-P-CCNC: 17 U/L (ref 12–78)
ANION GAP SERPL CALCULATED.3IONS-SCNC: 8 MMOL/L (ref 4–13)
AST SERPL W P-5'-P-CCNC: 45 U/L (ref 5–45)
BACTERIA UR QL AUTO: ABNORMAL /HPF
BASOPHILS # BLD AUTO: 0.05 THOUSANDS/ΜL (ref 0–0.1)
BASOPHILS NFR BLD AUTO: 0 % (ref 0–1)
BILIRUB SERPL-MCNC: 0.9 MG/DL (ref 0.2–1)
BILIRUB UR QL STRIP: NEGATIVE
BUN SERPL-MCNC: 31 MG/DL (ref 5–25)
CALCIUM SERPL-MCNC: 9.3 MG/DL (ref 8.3–10.1)
CHLORIDE SERPL-SCNC: 105 MMOL/L (ref 100–108)
CLARITY UR: ABNORMAL
CO2 SERPL-SCNC: 25 MMOL/L (ref 21–32)
COLOR UR: YELLOW
CREAT SERPL-MCNC: 1.33 MG/DL (ref 0.6–1.3)
EOSINOPHIL # BLD AUTO: 0.05 THOUSAND/ΜL (ref 0–0.61)
EOSINOPHIL NFR BLD AUTO: 0 % (ref 0–6)
ERYTHROCYTE [DISTWIDTH] IN BLOOD BY AUTOMATED COUNT: 12.6 % (ref 11.6–15.1)
GFR SERPL CREATININE-BSD FRML MDRD: 34 ML/MIN/1.73SQ M
GLUCOSE SERPL-MCNC: 98 MG/DL (ref 65–140)
GLUCOSE UR STRIP-MCNC: NEGATIVE MG/DL
HCT VFR BLD AUTO: 36.8 % (ref 34.8–46.1)
HGB BLD-MCNC: 12.1 G/DL (ref 11.5–15.4)
HGB UR QL STRIP.AUTO: ABNORMAL
IMM GRANULOCYTES # BLD AUTO: 0.05 THOUSAND/UL (ref 0–0.2)
IMM GRANULOCYTES NFR BLD AUTO: 0 % (ref 0–2)
KETONES UR STRIP-MCNC: NEGATIVE MG/DL
LEUKOCYTE ESTERASE UR QL STRIP: ABNORMAL
LYMPHOCYTES # BLD AUTO: 2.13 THOUSANDS/ΜL (ref 0.6–4.47)
LYMPHOCYTES NFR BLD AUTO: 16 % (ref 14–44)
MCH RBC QN AUTO: 32.2 PG (ref 26.8–34.3)
MCHC RBC AUTO-ENTMCNC: 32.9 G/DL (ref 31.4–37.4)
MCV RBC AUTO: 98 FL (ref 82–98)
MONOCYTES # BLD AUTO: 1.58 THOUSAND/ΜL (ref 0.17–1.22)
MONOCYTES NFR BLD AUTO: 12 % (ref 4–12)
NEUTROPHILS # BLD AUTO: 9.59 THOUSANDS/ΜL (ref 1.85–7.62)
NEUTS SEG NFR BLD AUTO: 72 % (ref 43–75)
NITRITE UR QL STRIP: NEGATIVE
NON-SQ EPI CELLS URNS QL MICRO: ABNORMAL /HPF
NRBC BLD AUTO-RTO: 0 /100 WBCS
PH UR STRIP.AUTO: 7 [PH]
PLATELET # BLD AUTO: 217 THOUSANDS/UL (ref 149–390)
PMV BLD AUTO: 11.5 FL (ref 8.9–12.7)
POTASSIUM SERPL-SCNC: 5.2 MMOL/L (ref 3.5–5.3)
PROT SERPL-MCNC: 7.5 G/DL (ref 6.4–8.2)
PROT UR STRIP-MCNC: ABNORMAL MG/DL
RBC # BLD AUTO: 3.76 MILLION/UL (ref 3.81–5.12)
RBC #/AREA URNS AUTO: ABNORMAL /HPF
SODIUM SERPL-SCNC: 138 MMOL/L (ref 136–145)
SP GR UR STRIP.AUTO: 1.02 (ref 1–1.03)
TROPONIN I SERPL-MCNC: 0.04 NG/ML
UROBILINOGEN UR QL STRIP.AUTO: 0.2 E.U./DL
WBC # BLD AUTO: 13.45 THOUSAND/UL (ref 4.31–10.16)
WBC #/AREA URNS AUTO: ABNORMAL /HPF

## 2020-12-23 PROCEDURE — 93005 ELECTROCARDIOGRAM TRACING: CPT

## 2020-12-23 PROCEDURE — 36415 COLL VENOUS BLD VENIPUNCTURE: CPT | Performed by: EMERGENCY MEDICINE

## 2020-12-23 PROCEDURE — 96360 HYDRATION IV INFUSION INIT: CPT

## 2020-12-23 PROCEDURE — 81001 URINALYSIS AUTO W/SCOPE: CPT | Performed by: EMERGENCY MEDICINE

## 2020-12-23 PROCEDURE — 87086 URINE CULTURE/COLONY COUNT: CPT | Performed by: INTERNAL MEDICINE

## 2020-12-23 PROCEDURE — 99285 EMERGENCY DEPT VISIT HI MDM: CPT

## 2020-12-23 PROCEDURE — 84484 ASSAY OF TROPONIN QUANT: CPT | Performed by: EMERGENCY MEDICINE

## 2020-12-23 PROCEDURE — 80053 COMPREHEN METABOLIC PANEL: CPT | Performed by: EMERGENCY MEDICINE

## 2020-12-23 PROCEDURE — 85025 COMPLETE CBC W/AUTO DIFF WBC: CPT | Performed by: EMERGENCY MEDICINE

## 2020-12-23 PROCEDURE — 87086 URINE CULTURE/COLONY COUNT: CPT | Performed by: EMERGENCY MEDICINE

## 2020-12-23 PROCEDURE — 99285 EMERGENCY DEPT VISIT HI MDM: CPT | Performed by: EMERGENCY MEDICINE

## 2020-12-23 PROCEDURE — 96372 THER/PROPH/DIAG INJ SC/IM: CPT

## 2020-12-23 RX ORDER — LORAZEPAM 2 MG/ML
1 INJECTION INTRAMUSCULAR ONCE
Status: COMPLETED | OUTPATIENT
Start: 2020-12-23 | End: 2020-12-23

## 2020-12-23 RX ORDER — HALOPERIDOL 5 MG/ML
2.5 INJECTION INTRAMUSCULAR ONCE
Status: COMPLETED | OUTPATIENT
Start: 2020-12-23 | End: 2020-12-23

## 2020-12-23 RX ADMIN — HALOPERIDOL LACTATE 2.5 MG: 5 INJECTION, SOLUTION INTRAMUSCULAR at 20:51

## 2020-12-23 RX ADMIN — LORAZEPAM 1 MG: 2 INJECTION INTRAMUSCULAR; INTRAVENOUS at 21:53

## 2020-12-23 RX ADMIN — SODIUM CHLORIDE 500 ML: 0.9 INJECTION, SOLUTION INTRAVENOUS at 23:22

## 2020-12-24 ENCOUNTER — APPOINTMENT (INPATIENT)
Dept: NON INVASIVE DIAGNOSTICS | Facility: HOSPITAL | Age: 85
DRG: 056 | End: 2020-12-24
Payer: MEDICARE

## 2020-12-24 ENCOUNTER — APPOINTMENT (INPATIENT)
Dept: INTERVENTIONAL RADIOLOGY/VASCULAR | Facility: HOSPITAL | Age: 85
DRG: 056 | End: 2020-12-24
Attending: RADIOLOGY
Payer: MEDICARE

## 2020-12-24 PROBLEM — R65.10 SIRS (SYSTEMIC INFLAMMATORY RESPONSE SYNDROME) (HCC): Status: ACTIVE | Noted: 2020-12-24

## 2020-12-24 PROBLEM — F03.91 AGITATION DUE TO DEMENTIA (HCC): Status: ACTIVE | Noted: 2020-12-24

## 2020-12-24 PROBLEM — G93.41 ACUTE METABOLIC ENCEPHALOPATHY: Status: ACTIVE | Noted: 2020-12-24

## 2020-12-24 PROBLEM — N18.30 CKD (CHRONIC KIDNEY DISEASE), STAGE III (HCC): Status: ACTIVE | Noted: 2020-12-24

## 2020-12-24 LAB
ATRIAL RATE: 99 BPM
FLUAV RNA RESP QL NAA+PROBE: NEGATIVE
FLUBV RNA RESP QL NAA+PROBE: NEGATIVE
P AXIS: 75 DEGREES
PR INTERVAL: 230 MS
QRS AXIS: -64 DEGREES
QRSD INTERVAL: 86 MS
QT INTERVAL: 366 MS
QTC INTERVAL: 469 MS
RSV RNA RESP QL NAA+PROBE: NEGATIVE
SARS-COV-2 RNA RESP QL NAA+PROBE: NEGATIVE
T WAVE AXIS: 68 DEGREES
VENTRICULAR RATE: 99 BPM

## 2020-12-24 PROCEDURE — 87040 BLOOD CULTURE FOR BACTERIA: CPT | Performed by: PHYSICIAN ASSISTANT

## 2020-12-24 PROCEDURE — 92610 EVALUATE SWALLOWING FUNCTION: CPT

## 2020-12-24 PROCEDURE — C1751 CATH, INF, PER/CENT/MIDLINE: HCPCS

## 2020-12-24 PROCEDURE — 99223 1ST HOSP IP/OBS HIGH 75: CPT | Performed by: INTERNAL MEDICINE

## 2020-12-24 PROCEDURE — 02HV33Z INSERTION OF INFUSION DEVICE INTO SUPERIOR VENA CAVA, PERCUTANEOUS APPROACH: ICD-10-PCS | Performed by: INTERNAL MEDICINE

## 2020-12-24 PROCEDURE — 93971 EXTREMITY STUDY: CPT

## 2020-12-24 PROCEDURE — 93010 ELECTROCARDIOGRAM REPORT: CPT | Performed by: INTERNAL MEDICINE

## 2020-12-24 PROCEDURE — 0241U HB NFCT DS VIR RESP RNA 4 TRGT: CPT | Performed by: EMERGENCY MEDICINE

## 2020-12-24 PROCEDURE — 87081 CULTURE SCREEN ONLY: CPT | Performed by: PHYSICIAN ASSISTANT

## 2020-12-24 PROCEDURE — 36573 INSJ PICC RS&I 5 YR+: CPT

## 2020-12-24 RX ORDER — ATORVASTATIN CALCIUM 40 MG/1
40 TABLET, FILM COATED ORAL
Status: DISCONTINUED | OUTPATIENT
Start: 2020-12-24 | End: 2020-12-28 | Stop reason: HOSPADM

## 2020-12-24 RX ORDER — QUETIAPINE FUMARATE 25 MG/1
25 TABLET, FILM COATED ORAL 2 TIMES DAILY
COMMUNITY

## 2020-12-24 RX ORDER — SACCHAROMYCES BOULARDII 250 MG
250 CAPSULE ORAL 2 TIMES DAILY
Status: DISCONTINUED | OUTPATIENT
Start: 2020-12-24 | End: 2020-12-28 | Stop reason: HOSPADM

## 2020-12-24 RX ORDER — CITALOPRAM 10 MG/1
10 TABLET ORAL DAILY
Status: DISCONTINUED | OUTPATIENT
Start: 2020-12-24 | End: 2020-12-28 | Stop reason: HOSPADM

## 2020-12-24 RX ORDER — CEFTRIAXONE 1 G/50ML
1000 INJECTION, SOLUTION INTRAVENOUS ONCE
Status: DISCONTINUED | OUTPATIENT
Start: 2020-12-24 | End: 2020-12-24

## 2020-12-24 RX ORDER — ASPIRIN 81 MG/1
81 TABLET ORAL EVERY OTHER DAY
Status: DISCONTINUED | OUTPATIENT
Start: 2020-12-24 | End: 2020-12-28 | Stop reason: HOSPADM

## 2020-12-24 RX ORDER — FAMOTIDINE 20 MG/1
10 TABLET, FILM COATED ORAL DAILY
Status: DISCONTINUED | OUTPATIENT
Start: 2020-12-24 | End: 2020-12-28 | Stop reason: HOSPADM

## 2020-12-24 RX ORDER — CEFTRIAXONE 1 G/50ML
1000 INJECTION, SOLUTION INTRAVENOUS ONCE
Status: COMPLETED | OUTPATIENT
Start: 2020-12-24 | End: 2020-12-24

## 2020-12-24 RX ORDER — MEMANTINE HYDROCHLORIDE 5 MG/1
10 TABLET ORAL 2 TIMES DAILY
Status: DISCONTINUED | OUTPATIENT
Start: 2020-12-24 | End: 2020-12-28 | Stop reason: HOSPADM

## 2020-12-24 RX ORDER — QUETIAPINE FUMARATE 25 MG/1
12.5 TABLET, FILM COATED ORAL 2 TIMES DAILY
Status: DISCONTINUED | OUTPATIENT
Start: 2020-12-24 | End: 2020-12-28 | Stop reason: HOSPADM

## 2020-12-24 RX ORDER — CEFEPIME HYDROCHLORIDE 1 G/50ML
1000 INJECTION, SOLUTION INTRAVENOUS EVERY 12 HOURS
Status: DISCONTINUED | OUTPATIENT
Start: 2020-12-24 | End: 2020-12-26

## 2020-12-24 RX ORDER — ACETAMINOPHEN 650 MG
1 TABLET, EXTENDED RELEASE ORAL 2 TIMES DAILY
COMMUNITY

## 2020-12-24 RX ORDER — SODIUM CHLORIDE, SODIUM GLUCONATE, SODIUM ACETATE, POTASSIUM CHLORIDE, MAGNESIUM CHLORIDE, SODIUM PHOSPHATE, DIBASIC, AND POTASSIUM PHOSPHATE .53; .5; .37; .037; .03; .012; .00082 G/100ML; G/100ML; G/100ML; G/100ML; G/100ML; G/100ML; G/100ML
100 INJECTION, SOLUTION INTRAVENOUS CONTINUOUS
Status: DISPENSED | OUTPATIENT
Start: 2020-12-24 | End: 2020-12-24

## 2020-12-24 RX ORDER — CEFTRIAXONE 1 G/50ML
1000 INJECTION, SOLUTION INTRAVENOUS EVERY 24 HOURS
Status: DISCONTINUED | OUTPATIENT
Start: 2020-12-25 | End: 2020-12-24

## 2020-12-24 RX ADMIN — MEMANTINE 10 MG: 5 TABLET ORAL at 18:01

## 2020-12-24 RX ADMIN — SODIUM CHLORIDE, SODIUM GLUCONATE, SODIUM ACETATE, POTASSIUM CHLORIDE, MAGNESIUM CHLORIDE, SODIUM PHOSPHATE, DIBASIC, AND POTASSIUM PHOSPHATE 100 ML/HR: .53; .5; .37; .037; .03; .012; .00082 INJECTION, SOLUTION INTRAVENOUS at 06:12

## 2020-12-24 RX ADMIN — ATORVASTATIN CALCIUM 40 MG: 40 TABLET, FILM COATED ORAL at 18:01

## 2020-12-24 RX ADMIN — VANCOMYCIN HYDROCHLORIDE 500 MG: 500 INJECTION, POWDER, LYOPHILIZED, FOR SOLUTION INTRAVENOUS at 18:00

## 2020-12-24 RX ADMIN — CEFTRIAXONE 1000 MG: 1 INJECTION, SOLUTION INTRAVENOUS at 06:41

## 2020-12-24 RX ADMIN — CEFEPIME HYDROCHLORIDE 1000 MG: 1 INJECTION, SOLUTION INTRAVENOUS at 21:09

## 2020-12-24 RX ADMIN — ENOXAPARIN SODIUM 30 MG: 30 INJECTION SUBCUTANEOUS at 11:22

## 2020-12-24 RX ADMIN — QUETIAPINE FUMARATE 12.5 MG: 25 TABLET ORAL at 18:01

## 2020-12-24 RX ADMIN — Medication 125 MG: at 21:08

## 2020-12-24 RX ADMIN — CARBIDOPA AND LEVODOPA 1 TABLET: 25; 100 TABLET ORAL at 18:01

## 2020-12-24 RX ADMIN — CARBIDOPA AND LEVODOPA 1 TABLET: 25; 100 TABLET ORAL at 21:08

## 2020-12-25 PROBLEM — A41.9 SEPSIS (HCC): Status: ACTIVE | Noted: 2020-12-24

## 2020-12-25 LAB
ANION GAP SERPL CALCULATED.3IONS-SCNC: 10 MMOL/L (ref 4–13)
BACTERIA UR CULT: NORMAL
BACTERIA UR CULT: NORMAL
BASOPHILS # BLD AUTO: 0.05 THOUSANDS/ΜL (ref 0–0.1)
BASOPHILS NFR BLD AUTO: 1 % (ref 0–1)
BUN SERPL-MCNC: 24 MG/DL (ref 5–25)
CALCIUM SERPL-MCNC: 8.2 MG/DL (ref 8.3–10.1)
CHLORIDE SERPL-SCNC: 108 MMOL/L (ref 100–108)
CO2 SERPL-SCNC: 26 MMOL/L (ref 21–32)
CREAT SERPL-MCNC: 1.06 MG/DL (ref 0.6–1.3)
EOSINOPHIL # BLD AUTO: 0.38 THOUSAND/ΜL (ref 0–0.61)
EOSINOPHIL NFR BLD AUTO: 6 % (ref 0–6)
ERYTHROCYTE [DISTWIDTH] IN BLOOD BY AUTOMATED COUNT: 12.7 % (ref 11.6–15.1)
GFR SERPL CREATININE-BSD FRML MDRD: 44 ML/MIN/1.73SQ M
GLUCOSE SERPL-MCNC: 65 MG/DL (ref 65–140)
HCT VFR BLD AUTO: 34.3 % (ref 34.8–46.1)
HGB BLD-MCNC: 11.2 G/DL (ref 11.5–15.4)
IMM GRANULOCYTES # BLD AUTO: 0.02 THOUSAND/UL (ref 0–0.2)
IMM GRANULOCYTES NFR BLD AUTO: 0 % (ref 0–2)
LYMPHOCYTES # BLD AUTO: 1.24 THOUSANDS/ΜL (ref 0.6–4.47)
LYMPHOCYTES NFR BLD AUTO: 20 % (ref 14–44)
MAGNESIUM SERPL-MCNC: 2.2 MG/DL (ref 1.6–2.6)
MCH RBC QN AUTO: 32.5 PG (ref 26.8–34.3)
MCHC RBC AUTO-ENTMCNC: 32.7 G/DL (ref 31.4–37.4)
MCV RBC AUTO: 99 FL (ref 82–98)
MONOCYTES # BLD AUTO: 0.7 THOUSAND/ΜL (ref 0.17–1.22)
MONOCYTES NFR BLD AUTO: 11 % (ref 4–12)
MRSA NOSE QL CULT: NORMAL
NEUTROPHILS # BLD AUTO: 3.95 THOUSANDS/ΜL (ref 1.85–7.62)
NEUTS SEG NFR BLD AUTO: 62 % (ref 43–75)
NRBC BLD AUTO-RTO: 0 /100 WBCS
PLATELET # BLD AUTO: 171 THOUSANDS/UL (ref 149–390)
PMV BLD AUTO: 10.9 FL (ref 8.9–12.7)
POTASSIUM SERPL-SCNC: 3.5 MMOL/L (ref 3.5–5.3)
RBC # BLD AUTO: 3.45 MILLION/UL (ref 3.81–5.12)
SODIUM SERPL-SCNC: 144 MMOL/L (ref 136–145)
WBC # BLD AUTO: 6.34 THOUSAND/UL (ref 4.31–10.16)

## 2020-12-25 PROCEDURE — 85025 COMPLETE CBC W/AUTO DIFF WBC: CPT | Performed by: INTERNAL MEDICINE

## 2020-12-25 PROCEDURE — 93971 EXTREMITY STUDY: CPT | Performed by: SURGERY

## 2020-12-25 PROCEDURE — 80048 BASIC METABOLIC PNL TOTAL CA: CPT | Performed by: INTERNAL MEDICINE

## 2020-12-25 PROCEDURE — 83735 ASSAY OF MAGNESIUM: CPT | Performed by: INTERNAL MEDICINE

## 2020-12-25 PROCEDURE — 99232 SBSQ HOSP IP/OBS MODERATE 35: CPT | Performed by: INTERNAL MEDICINE

## 2020-12-25 RX ORDER — POTASSIUM CHLORIDE 14.9 MG/ML
20 INJECTION INTRAVENOUS ONCE
Status: COMPLETED | OUTPATIENT
Start: 2020-12-25 | End: 2020-12-25

## 2020-12-25 RX ADMIN — POTASSIUM CHLORIDE 20 MEQ: 14.9 INJECTION, SOLUTION INTRAVENOUS at 08:02

## 2020-12-25 RX ADMIN — QUETIAPINE FUMARATE 12.5 MG: 25 TABLET ORAL at 17:40

## 2020-12-25 RX ADMIN — Medication 125 MG: at 21:26

## 2020-12-25 RX ADMIN — ENOXAPARIN SODIUM 30 MG: 30 INJECTION SUBCUTANEOUS at 08:03

## 2020-12-25 RX ADMIN — CEFEPIME HYDROCHLORIDE 1000 MG: 1 INJECTION, SOLUTION INTRAVENOUS at 21:27

## 2020-12-25 RX ADMIN — ATORVASTATIN CALCIUM 40 MG: 40 TABLET, FILM COATED ORAL at 17:42

## 2020-12-25 RX ADMIN — Medication 250 MG: at 17:42

## 2020-12-25 RX ADMIN — MEMANTINE 10 MG: 5 TABLET ORAL at 17:39

## 2020-12-25 RX ADMIN — VANCOMYCIN HYDROCHLORIDE 750 MG: 750 INJECTION, SOLUTION INTRAVENOUS at 17:59

## 2020-12-25 RX ADMIN — CARBIDOPA AND LEVODOPA 1 TABLET: 25; 100 TABLET ORAL at 21:26

## 2020-12-25 RX ADMIN — CARBIDOPA AND LEVODOPA 1 TABLET: 25; 100 TABLET ORAL at 17:51

## 2020-12-25 RX ADMIN — CEFEPIME HYDROCHLORIDE 1000 MG: 1 INJECTION, SOLUTION INTRAVENOUS at 10:33

## 2020-12-26 LAB
ALBUMIN SERPL BCP-MCNC: 2.7 G/DL (ref 3.5–5)
ALP SERPL-CCNC: 67 U/L (ref 46–116)
ALT SERPL W P-5'-P-CCNC: 8 U/L (ref 12–78)
ANION GAP SERPL CALCULATED.3IONS-SCNC: 6 MMOL/L (ref 4–13)
AST SERPL W P-5'-P-CCNC: 29 U/L (ref 5–45)
BASOPHILS # BLD AUTO: 0.03 THOUSANDS/ΜL (ref 0–0.1)
BASOPHILS NFR BLD AUTO: 0 % (ref 0–1)
BILIRUB SERPL-MCNC: 0.6 MG/DL (ref 0.2–1)
BUN SERPL-MCNC: 27 MG/DL (ref 5–25)
CALCIUM ALBUM COR SERPL-MCNC: 9.2 MG/DL (ref 8.3–10.1)
CALCIUM SERPL-MCNC: 8.2 MG/DL (ref 8.3–10.1)
CHLORIDE SERPL-SCNC: 109 MMOL/L (ref 100–108)
CO2 SERPL-SCNC: 29 MMOL/L (ref 21–32)
CREAT SERPL-MCNC: 1.23 MG/DL (ref 0.6–1.3)
EOSINOPHIL # BLD AUTO: 0.25 THOUSAND/ΜL (ref 0–0.61)
EOSINOPHIL NFR BLD AUTO: 3 % (ref 0–6)
ERYTHROCYTE [DISTWIDTH] IN BLOOD BY AUTOMATED COUNT: 12.7 % (ref 11.6–15.1)
GFR SERPL CREATININE-BSD FRML MDRD: 37 ML/MIN/1.73SQ M
GLUCOSE SERPL-MCNC: 81 MG/DL (ref 65–140)
HCT VFR BLD AUTO: 32.7 % (ref 34.8–46.1)
HGB BLD-MCNC: 10.5 G/DL (ref 11.5–15.4)
IMM GRANULOCYTES # BLD AUTO: 0.02 THOUSAND/UL (ref 0–0.2)
IMM GRANULOCYTES NFR BLD AUTO: 0 % (ref 0–2)
LYMPHOCYTES # BLD AUTO: 1.56 THOUSANDS/ΜL (ref 0.6–4.47)
LYMPHOCYTES NFR BLD AUTO: 20 % (ref 14–44)
MCH RBC QN AUTO: 32 PG (ref 26.8–34.3)
MCHC RBC AUTO-ENTMCNC: 32.1 G/DL (ref 31.4–37.4)
MCV RBC AUTO: 100 FL (ref 82–98)
MONOCYTES # BLD AUTO: 0.97 THOUSAND/ΜL (ref 0.17–1.22)
MONOCYTES NFR BLD AUTO: 13 % (ref 4–12)
NEUTROPHILS # BLD AUTO: 4.96 THOUSANDS/ΜL (ref 1.85–7.62)
NEUTS SEG NFR BLD AUTO: 64 % (ref 43–75)
NRBC BLD AUTO-RTO: 0 /100 WBCS
PLATELET # BLD AUTO: 168 THOUSANDS/UL (ref 149–390)
PMV BLD AUTO: 11.4 FL (ref 8.9–12.7)
POTASSIUM SERPL-SCNC: 4 MMOL/L (ref 3.5–5.3)
PROT SERPL-MCNC: 5.9 G/DL (ref 6.4–8.2)
RBC # BLD AUTO: 3.28 MILLION/UL (ref 3.81–5.12)
SODIUM SERPL-SCNC: 144 MMOL/L (ref 136–145)
WBC # BLD AUTO: 7.79 THOUSAND/UL (ref 4.31–10.16)

## 2020-12-26 PROCEDURE — 85025 COMPLETE CBC W/AUTO DIFF WBC: CPT | Performed by: INTERNAL MEDICINE

## 2020-12-26 PROCEDURE — 99232 SBSQ HOSP IP/OBS MODERATE 35: CPT | Performed by: INTERNAL MEDICINE

## 2020-12-26 PROCEDURE — 80053 COMPREHEN METABOLIC PANEL: CPT | Performed by: INTERNAL MEDICINE

## 2020-12-26 RX ORDER — SODIUM CHLORIDE 9 MG/ML
75 INJECTION, SOLUTION INTRAVENOUS CONTINUOUS
Status: DISPENSED | OUTPATIENT
Start: 2020-12-26 | End: 2020-12-26

## 2020-12-26 RX ORDER — ACETAMINOPHEN 325 MG/1
650 TABLET ORAL EVERY 6 HOURS PRN
Status: DISCONTINUED | OUTPATIENT
Start: 2020-12-26 | End: 2020-12-28 | Stop reason: HOSPADM

## 2020-12-26 RX ADMIN — ENOXAPARIN SODIUM 30 MG: 30 INJECTION SUBCUTANEOUS at 08:26

## 2020-12-26 RX ADMIN — FAMOTIDINE 10 MG: 20 TABLET ORAL at 08:27

## 2020-12-26 RX ADMIN — CEFEPIME HYDROCHLORIDE 1000 MG: 1 INJECTION, SOLUTION INTRAVENOUS at 09:12

## 2020-12-26 RX ADMIN — CARBIDOPA AND LEVODOPA 1 TABLET: 25; 100 TABLET ORAL at 08:27

## 2020-12-26 RX ADMIN — CARBIDOPA AND LEVODOPA 1 TABLET: 25; 100 TABLET ORAL at 16:01

## 2020-12-26 RX ADMIN — Medication 250 MG: at 17:58

## 2020-12-26 RX ADMIN — Medication 250 MG: at 08:27

## 2020-12-26 RX ADMIN — ACETAMINOPHEN 650 MG: 325 TABLET, FILM COATED ORAL at 21:51

## 2020-12-26 RX ADMIN — QUETIAPINE FUMARATE 12.5 MG: 25 TABLET ORAL at 17:59

## 2020-12-26 RX ADMIN — CITALOPRAM HYDROBROMIDE 10 MG: 10 TABLET ORAL at 08:27

## 2020-12-26 RX ADMIN — QUETIAPINE FUMARATE 12.5 MG: 25 TABLET ORAL at 08:27

## 2020-12-26 RX ADMIN — MEMANTINE 10 MG: 5 TABLET ORAL at 08:26

## 2020-12-26 RX ADMIN — ATORVASTATIN CALCIUM 40 MG: 40 TABLET, FILM COATED ORAL at 16:01

## 2020-12-26 RX ADMIN — SODIUM CHLORIDE 75 ML/HR: 0.9 INJECTION, SOLUTION INTRAVENOUS at 08:25

## 2020-12-26 RX ADMIN — ASPIRIN 81 MG: 81 TABLET, COATED ORAL at 08:27

## 2020-12-26 RX ADMIN — CARBIDOPA AND LEVODOPA 1 TABLET: 25; 100 TABLET ORAL at 21:51

## 2020-12-26 RX ADMIN — Medication 125 MG: at 08:29

## 2020-12-26 RX ADMIN — CEFEPIME HYDROCHLORIDE 1000 MG: 1 INJECTION, SOLUTION INTRAVENOUS at 21:47

## 2020-12-26 RX ADMIN — VANCOMYCIN HYDROCHLORIDE 750 MG: 750 INJECTION, SOLUTION INTRAVENOUS at 17:57

## 2020-12-26 RX ADMIN — MEMANTINE 10 MG: 5 TABLET ORAL at 17:58

## 2020-12-26 RX ADMIN — Medication 125 MG: at 21:47

## 2020-12-27 LAB
ANION GAP SERPL CALCULATED.3IONS-SCNC: 7 MMOL/L (ref 4–13)
BASOPHILS # BLD AUTO: 0.03 THOUSANDS/ΜL (ref 0–0.1)
BASOPHILS NFR BLD AUTO: 1 % (ref 0–1)
BUN SERPL-MCNC: 25 MG/DL (ref 5–25)
CALCIUM SERPL-MCNC: 8.1 MG/DL (ref 8.3–10.1)
CHLORIDE SERPL-SCNC: 109 MMOL/L (ref 100–108)
CO2 SERPL-SCNC: 26 MMOL/L (ref 21–32)
CREAT SERPL-MCNC: 1.24 MG/DL (ref 0.6–1.3)
EOSINOPHIL # BLD AUTO: 0.45 THOUSAND/ΜL (ref 0–0.61)
EOSINOPHIL NFR BLD AUTO: 9 % (ref 0–6)
ERYTHROCYTE [DISTWIDTH] IN BLOOD BY AUTOMATED COUNT: 12.8 % (ref 11.6–15.1)
GFR SERPL CREATININE-BSD FRML MDRD: 36 ML/MIN/1.73SQ M
GLUCOSE SERPL-MCNC: 88 MG/DL (ref 65–140)
HCT VFR BLD AUTO: 32 % (ref 34.8–46.1)
HGB BLD-MCNC: 10.2 G/DL (ref 11.5–15.4)
IMM GRANULOCYTES # BLD AUTO: 0.02 THOUSAND/UL (ref 0–0.2)
IMM GRANULOCYTES NFR BLD AUTO: 0 % (ref 0–2)
LYMPHOCYTES # BLD AUTO: 1.61 THOUSANDS/ΜL (ref 0.6–4.47)
LYMPHOCYTES NFR BLD AUTO: 32 % (ref 14–44)
MCH RBC QN AUTO: 32.4 PG (ref 26.8–34.3)
MCHC RBC AUTO-ENTMCNC: 31.9 G/DL (ref 31.4–37.4)
MCV RBC AUTO: 102 FL (ref 82–98)
MONOCYTES # BLD AUTO: 0.8 THOUSAND/ΜL (ref 0.17–1.22)
MONOCYTES NFR BLD AUTO: 16 % (ref 4–12)
NEUTROPHILS # BLD AUTO: 2.15 THOUSANDS/ΜL (ref 1.85–7.62)
NEUTS SEG NFR BLD AUTO: 42 % (ref 43–75)
NRBC BLD AUTO-RTO: 0 /100 WBCS
PLATELET # BLD AUTO: 123 THOUSANDS/UL (ref 149–390)
PMV BLD AUTO: 11.5 FL (ref 8.9–12.7)
POTASSIUM SERPL-SCNC: 3.9 MMOL/L (ref 3.5–5.3)
RBC # BLD AUTO: 3.15 MILLION/UL (ref 3.81–5.12)
SODIUM SERPL-SCNC: 142 MMOL/L (ref 136–145)
WBC # BLD AUTO: 5.06 THOUSAND/UL (ref 4.31–10.16)

## 2020-12-27 PROCEDURE — 85025 COMPLETE CBC W/AUTO DIFF WBC: CPT | Performed by: INTERNAL MEDICINE

## 2020-12-27 PROCEDURE — 80048 BASIC METABOLIC PNL TOTAL CA: CPT | Performed by: INTERNAL MEDICINE

## 2020-12-27 PROCEDURE — 99232 SBSQ HOSP IP/OBS MODERATE 35: CPT | Performed by: INTERNAL MEDICINE

## 2020-12-27 RX ADMIN — QUETIAPINE FUMARATE 12.5 MG: 25 TABLET ORAL at 17:59

## 2020-12-27 RX ADMIN — QUETIAPINE FUMARATE 12.5 MG: 25 TABLET ORAL at 09:12

## 2020-12-27 RX ADMIN — Medication 250 MG: at 09:12

## 2020-12-27 RX ADMIN — MEMANTINE 10 MG: 5 TABLET ORAL at 17:57

## 2020-12-27 RX ADMIN — CARBIDOPA AND LEVODOPA 1 TABLET: 25; 100 TABLET ORAL at 21:31

## 2020-12-27 RX ADMIN — ATORVASTATIN CALCIUM 40 MG: 40 TABLET, FILM COATED ORAL at 17:57

## 2020-12-27 RX ADMIN — FAMOTIDINE 10 MG: 20 TABLET ORAL at 09:12

## 2020-12-27 RX ADMIN — Medication 250 MG: at 17:59

## 2020-12-27 RX ADMIN — CITALOPRAM HYDROBROMIDE 10 MG: 10 TABLET ORAL at 09:12

## 2020-12-27 RX ADMIN — CARBIDOPA AND LEVODOPA 1 TABLET: 25; 100 TABLET ORAL at 09:12

## 2020-12-27 RX ADMIN — MEMANTINE 10 MG: 5 TABLET ORAL at 09:12

## 2020-12-27 RX ADMIN — ENOXAPARIN SODIUM 30 MG: 30 INJECTION SUBCUTANEOUS at 09:12

## 2020-12-27 RX ADMIN — CARBIDOPA AND LEVODOPA 1 TABLET: 25; 100 TABLET ORAL at 17:59

## 2020-12-28 VITALS
OXYGEN SATURATION: 94 % | HEART RATE: 54 BPM | RESPIRATION RATE: 19 BRPM | SYSTOLIC BLOOD PRESSURE: 121 MMHG | WEIGHT: 128.31 LBS | DIASTOLIC BLOOD PRESSURE: 50 MMHG | HEIGHT: 60 IN | BODY MASS INDEX: 25.19 KG/M2 | TEMPERATURE: 98.2 F

## 2020-12-28 LAB
ANION GAP SERPL CALCULATED.3IONS-SCNC: 5 MMOL/L (ref 4–13)
BASOPHILS # BLD AUTO: 0.04 THOUSANDS/ΜL (ref 0–0.1)
BASOPHILS NFR BLD AUTO: 1 % (ref 0–1)
BUN SERPL-MCNC: 19 MG/DL (ref 5–25)
CALCIUM SERPL-MCNC: 8 MG/DL (ref 8.3–10.1)
CHLORIDE SERPL-SCNC: 109 MMOL/L (ref 100–108)
CO2 SERPL-SCNC: 27 MMOL/L (ref 21–32)
CREAT SERPL-MCNC: 1.06 MG/DL (ref 0.6–1.3)
EOSINOPHIL # BLD AUTO: 0.61 THOUSAND/ΜL (ref 0–0.61)
EOSINOPHIL NFR BLD AUTO: 10 % (ref 0–6)
ERYTHROCYTE [DISTWIDTH] IN BLOOD BY AUTOMATED COUNT: 12.6 % (ref 11.6–15.1)
FLUAV RNA RESP QL NAA+PROBE: NEGATIVE
FLUBV RNA RESP QL NAA+PROBE: NEGATIVE
GFR SERPL CREATININE-BSD FRML MDRD: 44 ML/MIN/1.73SQ M
GLUCOSE SERPL-MCNC: 81 MG/DL (ref 65–140)
HCT VFR BLD AUTO: 32 % (ref 34.8–46.1)
HGB BLD-MCNC: 10.2 G/DL (ref 11.5–15.4)
IMM GRANULOCYTES # BLD AUTO: 0.02 THOUSAND/UL (ref 0–0.2)
IMM GRANULOCYTES NFR BLD AUTO: 0 % (ref 0–2)
LYMPHOCYTES # BLD AUTO: 1.4 THOUSANDS/ΜL (ref 0.6–4.47)
LYMPHOCYTES NFR BLD AUTO: 23 % (ref 14–44)
MCH RBC QN AUTO: 32.2 PG (ref 26.8–34.3)
MCHC RBC AUTO-ENTMCNC: 31.9 G/DL (ref 31.4–37.4)
MCV RBC AUTO: 101 FL (ref 82–98)
MONOCYTES # BLD AUTO: 0.78 THOUSAND/ΜL (ref 0.17–1.22)
MONOCYTES NFR BLD AUTO: 13 % (ref 4–12)
NEUTROPHILS # BLD AUTO: 3.25 THOUSANDS/ΜL (ref 1.85–7.62)
NEUTS SEG NFR BLD AUTO: 53 % (ref 43–75)
NRBC BLD AUTO-RTO: 0 /100 WBCS
PLATELET # BLD AUTO: 132 THOUSANDS/UL (ref 149–390)
PMV BLD AUTO: 11.6 FL (ref 8.9–12.7)
POTASSIUM SERPL-SCNC: 4 MMOL/L (ref 3.5–5.3)
RBC # BLD AUTO: 3.17 MILLION/UL (ref 3.81–5.12)
RSV RNA RESP QL NAA+PROBE: NEGATIVE
SARS-COV-2 RNA RESP QL NAA+PROBE: NEGATIVE
SODIUM SERPL-SCNC: 141 MMOL/L (ref 136–145)
WBC # BLD AUTO: 6.1 THOUSAND/UL (ref 4.31–10.16)

## 2020-12-28 PROCEDURE — 99238 HOSP IP/OBS DSCHRG MGMT 30/<: CPT | Performed by: INTERNAL MEDICINE

## 2020-12-28 PROCEDURE — 80048 BASIC METABOLIC PNL TOTAL CA: CPT | Performed by: INTERNAL MEDICINE

## 2020-12-28 PROCEDURE — 85025 COMPLETE CBC W/AUTO DIFF WBC: CPT | Performed by: INTERNAL MEDICINE

## 2020-12-28 PROCEDURE — 0241U HB NFCT DS VIR RESP RNA 4 TRGT: CPT | Performed by: INTERNAL MEDICINE

## 2020-12-28 PROCEDURE — 92526 ORAL FUNCTION THERAPY: CPT

## 2020-12-28 RX ADMIN — ENOXAPARIN SODIUM 30 MG: 30 INJECTION SUBCUTANEOUS at 09:29

## 2020-12-28 RX ADMIN — QUETIAPINE FUMARATE 12.5 MG: 25 TABLET ORAL at 09:31

## 2020-12-28 RX ADMIN — CITALOPRAM HYDROBROMIDE 10 MG: 10 TABLET ORAL at 09:31

## 2020-12-28 RX ADMIN — CARBIDOPA AND LEVODOPA 1 TABLET: 25; 100 TABLET ORAL at 09:32

## 2020-12-28 RX ADMIN — Medication 250 MG: at 09:31

## 2020-12-28 RX ADMIN — ASPIRIN 81 MG: 81 TABLET, COATED ORAL at 09:31

## 2020-12-28 RX ADMIN — FAMOTIDINE 10 MG: 20 TABLET ORAL at 09:32

## 2020-12-28 RX ADMIN — MEMANTINE 10 MG: 5 TABLET ORAL at 09:31

## 2020-12-29 ENCOUNTER — TELEPHONE (OUTPATIENT)
Dept: FAMILY MEDICINE CLINIC | Facility: CLINIC | Age: 85
End: 2020-12-29

## 2020-12-29 LAB — BACTERIA BLD CULT: NORMAL

## 2020-12-30 ENCOUNTER — TRANSITIONAL CARE MANAGEMENT (OUTPATIENT)
Dept: FAMILY MEDICINE CLINIC | Facility: CLINIC | Age: 85
End: 2020-12-30

## 2020-12-30 ENCOUNTER — PATIENT OUTREACH (OUTPATIENT)
Dept: FAMILY MEDICINE CLINIC | Facility: CLINIC | Age: 85
End: 2020-12-30

## 2020-12-30 LAB — EXT SARS-COV-2: NOT DETECTED

## 2021-01-05 ENCOUNTER — PATIENT OUTREACH (OUTPATIENT)
Dept: FAMILY MEDICINE CLINIC | Facility: CLINIC | Age: 86
End: 2021-01-05

## 2021-01-05 ENCOUNTER — EPISODE CHANGES (OUTPATIENT)
Dept: CASE MANAGEMENT | Facility: HOSPITAL | Age: 86
End: 2021-01-05

## 2021-01-05 DIAGNOSIS — G20 PARKINSON DISEASE (HCC): ICD-10-CM

## 2021-01-05 NOTE — PROGRESS NOTES
Outpatient Care Management Note:    Inbasket message received stating that BPCI DRG is ineligible  BPCI episode was closed    CM removed self from care team

## 2021-01-05 NOTE — TELEPHONE ENCOUNTER
Pts daughter is asking if her memantine dosage can be increased  She also needs the carbidopa refilled  Paperwork is gonna be faxed over for you regarding this  Pt is in hospice right now  Please advise

## 2021-01-07 DIAGNOSIS — F03.90 DEMENTIA WITHOUT BEHAVIORAL DISTURBANCE, UNSPECIFIED DEMENTIA TYPE (HCC): Primary | ICD-10-CM

## 2021-01-07 RX ORDER — MEMANTINE HYDROCHLORIDE 10 MG/1
TABLET ORAL
Qty: 25 TABLET | Refills: 0 | Status: SHIPPED | OUTPATIENT
Start: 2021-01-07 | End: 2021-01-25

## 2021-01-08 LAB — EXT SARS-COV-2: NOT DETECTED

## 2021-01-25 DIAGNOSIS — F03.90 DEMENTIA WITHOUT BEHAVIORAL DISTURBANCE, UNSPECIFIED DEMENTIA TYPE (HCC): ICD-10-CM

## 2021-01-25 DIAGNOSIS — G20 PARKINSON DISEASE (HCC): ICD-10-CM

## 2021-01-25 RX ORDER — MEMANTINE HYDROCHLORIDE 10 MG/1
TABLET ORAL
Qty: 25 TABLET | Refills: 0 | Status: SHIPPED | OUTPATIENT
Start: 2021-01-25

## 2021-01-27 PROCEDURE — U0005 INFEC AGEN DETEC AMPLI PROBE: HCPCS | Performed by: INTERNAL MEDICINE

## 2021-01-27 PROCEDURE — U0003 INFECTIOUS AGENT DETECTION BY NUCLEIC ACID (DNA OR RNA); SEVERE ACUTE RESPIRATORY SYNDROME CORONAVIRUS 2 (SARS-COV-2) (CORONAVIRUS DISEASE [COVID-19]), AMPLIFIED PROBE TECHNIQUE, MAKING USE OF HIGH THROUGHPUT TECHNOLOGIES AS DESCRIBED BY CMS-2020-01-R: HCPCS | Performed by: INTERNAL MEDICINE

## 2021-01-28 ENCOUNTER — LAB REQUISITION (OUTPATIENT)
Dept: LAB | Facility: HOSPITAL | Age: 86
End: 2021-01-28
Payer: MEDICARE

## 2021-01-28 DIAGNOSIS — Z11.59 ENCOUNTER FOR SCREENING FOR OTHER VIRAL DISEASES: ICD-10-CM

## 2021-01-29 LAB — SARS-COV-2 RNA RESP QL NAA+PROBE: NEGATIVE

## 2021-02-02 PROCEDURE — U0003 INFECTIOUS AGENT DETECTION BY NUCLEIC ACID (DNA OR RNA); SEVERE ACUTE RESPIRATORY SYNDROME CORONAVIRUS 2 (SARS-COV-2) (CORONAVIRUS DISEASE [COVID-19]), AMPLIFIED PROBE TECHNIQUE, MAKING USE OF HIGH THROUGHPUT TECHNOLOGIES AS DESCRIBED BY CMS-2020-01-R: HCPCS | Performed by: INTERNAL MEDICINE

## 2021-02-02 PROCEDURE — U0005 INFEC AGEN DETEC AMPLI PROBE: HCPCS | Performed by: INTERNAL MEDICINE

## 2021-02-03 ENCOUNTER — LAB REQUISITION (OUTPATIENT)
Dept: LAB | Facility: HOSPITAL | Age: 86
End: 2021-02-03
Payer: MEDICARE

## 2021-02-03 DIAGNOSIS — Z11.59 ENCOUNTER FOR SCREENING FOR OTHER VIRAL DISEASES: ICD-10-CM

## 2021-02-03 LAB — SARS-COV-2 RNA RESP QL NAA+PROBE: NEGATIVE

## 2021-02-04 RX ORDER — NYSTATIN 100000 [USP'U]/G
POWDER TOPICAL
COMMUNITY
Start: 2021-01-07

## 2021-02-10 PROCEDURE — U0005 INFEC AGEN DETEC AMPLI PROBE: HCPCS | Performed by: INTERNAL MEDICINE

## 2021-02-10 PROCEDURE — U0003 INFECTIOUS AGENT DETECTION BY NUCLEIC ACID (DNA OR RNA); SEVERE ACUTE RESPIRATORY SYNDROME CORONAVIRUS 2 (SARS-COV-2) (CORONAVIRUS DISEASE [COVID-19]), AMPLIFIED PROBE TECHNIQUE, MAKING USE OF HIGH THROUGHPUT TECHNOLOGIES AS DESCRIBED BY CMS-2020-01-R: HCPCS | Performed by: INTERNAL MEDICINE

## 2021-02-11 ENCOUNTER — LAB REQUISITION (OUTPATIENT)
Dept: LAB | Facility: HOSPITAL | Age: 86
End: 2021-02-11
Payer: MEDICARE

## 2021-02-11 DIAGNOSIS — Z11.59 ENCOUNTER FOR SCREENING FOR OTHER VIRAL DISEASES: ICD-10-CM

## 2021-02-11 LAB — SARS-COV-2 RNA RESP QL NAA+PROBE: NEGATIVE

## 2021-02-16 PROCEDURE — U0003 INFECTIOUS AGENT DETECTION BY NUCLEIC ACID (DNA OR RNA); SEVERE ACUTE RESPIRATORY SYNDROME CORONAVIRUS 2 (SARS-COV-2) (CORONAVIRUS DISEASE [COVID-19]), AMPLIFIED PROBE TECHNIQUE, MAKING USE OF HIGH THROUGHPUT TECHNOLOGIES AS DESCRIBED BY CMS-2020-01-R: HCPCS | Performed by: INTERNAL MEDICINE

## 2021-02-16 PROCEDURE — U0005 INFEC AGEN DETEC AMPLI PROBE: HCPCS | Performed by: INTERNAL MEDICINE

## 2021-02-17 ENCOUNTER — LAB REQUISITION (OUTPATIENT)
Dept: LAB | Facility: HOSPITAL | Age: 86
End: 2021-02-17
Payer: MEDICARE

## 2021-02-17 DIAGNOSIS — Z11.59 ENCOUNTER FOR SCREENING FOR OTHER VIRAL DISEASES: ICD-10-CM

## 2021-02-17 LAB — SARS-COV-2 RNA RESP QL NAA+PROBE: NEGATIVE

## 2021-02-23 PROCEDURE — U0005 INFEC AGEN DETEC AMPLI PROBE: HCPCS | Performed by: INTERNAL MEDICINE

## 2021-02-23 PROCEDURE — U0003 INFECTIOUS AGENT DETECTION BY NUCLEIC ACID (DNA OR RNA); SEVERE ACUTE RESPIRATORY SYNDROME CORONAVIRUS 2 (SARS-COV-2) (CORONAVIRUS DISEASE [COVID-19]), AMPLIFIED PROBE TECHNIQUE, MAKING USE OF HIGH THROUGHPUT TECHNOLOGIES AS DESCRIBED BY CMS-2020-01-R: HCPCS | Performed by: INTERNAL MEDICINE

## 2021-02-24 ENCOUNTER — LAB REQUISITION (OUTPATIENT)
Dept: LAB | Facility: HOSPITAL | Age: 86
End: 2021-02-24
Payer: MEDICARE

## 2021-02-24 DIAGNOSIS — Z11.59 ENCOUNTER FOR SCREENING FOR OTHER VIRAL DISEASES: ICD-10-CM

## 2021-02-24 LAB — SARS-COV-2 RNA RESP QL NAA+PROBE: NEGATIVE

## 2021-03-03 ENCOUNTER — LAB REQUISITION (OUTPATIENT)
Dept: LAB | Facility: HOSPITAL | Age: 86
End: 2021-03-03
Payer: MEDICARE

## 2021-03-03 DIAGNOSIS — Z11.59 ENCOUNTER FOR SCREENING FOR OTHER VIRAL DISEASES: ICD-10-CM

## 2021-03-03 PROCEDURE — U0005 INFEC AGEN DETEC AMPLI PROBE: HCPCS | Performed by: INTERNAL MEDICINE

## 2021-03-03 PROCEDURE — U0003 INFECTIOUS AGENT DETECTION BY NUCLEIC ACID (DNA OR RNA); SEVERE ACUTE RESPIRATORY SYNDROME CORONAVIRUS 2 (SARS-COV-2) (CORONAVIRUS DISEASE [COVID-19]), AMPLIFIED PROBE TECHNIQUE, MAKING USE OF HIGH THROUGHPUT TECHNOLOGIES AS DESCRIBED BY CMS-2020-01-R: HCPCS | Performed by: INTERNAL MEDICINE

## 2021-03-04 LAB — SARS-COV-2 RNA RESP QL NAA+PROBE: NEGATIVE

## 2021-03-12 NOTE — TELEPHONE ENCOUNTER

## 2021-03-18 ENCOUNTER — TELEPHONE (OUTPATIENT)
Dept: FAMILY MEDICINE CLINIC | Facility: CLINIC | Age: 86
End: 2021-03-18

## 2021-03-18 NOTE — TELEPHONE ENCOUNTER
FYI:  [facility] contacted hospice about an xray order  Hospice wants them to wait and do pain management

## 2021-04-05 ENCOUNTER — TELEPHONE (OUTPATIENT)
Dept: FAMILY MEDICINE CLINIC | Facility: CLINIC | Age: 86
End: 2021-04-05